# Patient Record
Sex: MALE | Race: BLACK OR AFRICAN AMERICAN | Employment: UNEMPLOYED | URBAN - METROPOLITAN AREA
[De-identification: names, ages, dates, MRNs, and addresses within clinical notes are randomized per-mention and may not be internally consistent; named-entity substitution may affect disease eponyms.]

---

## 2020-01-01 ENCOUNTER — APPOINTMENT (OUTPATIENT)
Dept: CT IMAGING | Age: 40
DRG: 182 | End: 2020-01-01
Payer: MEDICARE

## 2020-01-01 ENCOUNTER — ANESTHESIA (OUTPATIENT)
Dept: OPERATING ROOM | Age: 40
DRG: 182 | End: 2020-01-01
Payer: MEDICARE

## 2020-01-01 ENCOUNTER — APPOINTMENT (OUTPATIENT)
Dept: GENERAL RADIOLOGY | Age: 40
DRG: 182 | End: 2020-01-01
Payer: MEDICARE

## 2020-01-01 ENCOUNTER — ANESTHESIA EVENT (OUTPATIENT)
Dept: OPERATING ROOM | Age: 40
DRG: 182 | End: 2020-01-01
Payer: MEDICARE

## 2020-01-01 ENCOUNTER — HOSPITAL ENCOUNTER (INPATIENT)
Age: 40
LOS: 11 days | DRG: 182 | End: 2020-03-16
Attending: EMERGENCY MEDICINE | Admitting: THORACIC SURGERY (CARDIOTHORACIC VASCULAR SURGERY)
Payer: MEDICARE

## 2020-01-01 VITALS
OXYGEN SATURATION: 66 % | TEMPERATURE: 99.5 F | SYSTOLIC BLOOD PRESSURE: 142 MMHG | RESPIRATION RATE: 20 BRPM | DIASTOLIC BLOOD PRESSURE: 64 MMHG

## 2020-01-01 VITALS — OXYGEN SATURATION: 85 % | TEMPERATURE: 101.1 F | RESPIRATION RATE: 19 BRPM

## 2020-01-01 LAB
ABO/RH: NORMAL
ACTIVATED CLOTTING TIME: 116 SEC (ref 99–130)
ACTIVATED CLOTTING TIME: 124 SEC (ref 99–130)
ACTIVATED CLOTTING TIME: 132 SEC (ref 99–130)
ACTIVATED CLOTTING TIME: 560 SEC (ref 99–130)
ACTIVATED CLOTTING TIME: 620 SEC (ref 99–130)
ACTIVATED CLOTTING TIME: 677 SEC (ref 99–130)
ACTIVATED CLOTTING TIME: 687 SEC (ref 99–130)
ACTIVATED CLOTTING TIME: 819 SEC (ref 99–130)
ACTIVATED CLOTTING TIME: 852 SEC (ref 99–130)
ALBUMIN SERPL-MCNC: 2 G/DL (ref 3.4–5)
ALBUMIN SERPL-MCNC: 2 G/DL (ref 3.4–5)
ALBUMIN SERPL-MCNC: 2.1 G/DL (ref 3.4–5)
ALBUMIN SERPL-MCNC: 2.2 G/DL (ref 3.4–5)
ALBUMIN SERPL-MCNC: 2.3 G/DL (ref 3.4–5)
ALBUMIN SERPL-MCNC: 2.4 G/DL (ref 3.4–5)
ALBUMIN SERPL-MCNC: 2.5 G/DL (ref 3.4–5)
ALBUMIN SERPL-MCNC: 2.6 G/DL (ref 3.4–5)
ALBUMIN SERPL-MCNC: 2.7 G/DL (ref 3.4–5)
ALBUMIN SERPL-MCNC: 2.7 G/DL (ref 3.4–5)
ALBUMIN SERPL-MCNC: 2.8 G/DL (ref 3.4–5)
ALBUMIN SERPL-MCNC: 3.2 G/DL (ref 3.4–5)
ALBUMIN SERPL-MCNC: 3.2 G/DL (ref 3.4–5)
ALBUMIN SERPL-MCNC: 3.3 G/DL (ref 3.4–5)
ALBUMIN SERPL-MCNC: 3.6 G/DL (ref 3.4–5)
ALBUMIN SERPL-MCNC: 4 G/DL (ref 3.4–5)
ALP BLD-CCNC: 270 U/L (ref 40–129)
ALP BLD-CCNC: 298 U/L (ref 40–129)
ALP BLD-CCNC: 389 U/L (ref 40–129)
ALP BLD-CCNC: 46 U/L (ref 40–129)
ALP BLD-CCNC: 48 U/L (ref 40–129)
ALP BLD-CCNC: 481 U/L (ref 40–129)
ALP BLD-CCNC: 485 U/L (ref 40–129)
ALP BLD-CCNC: 49 U/L (ref 40–129)
ALP BLD-CCNC: 49 U/L (ref 40–129)
ALP BLD-CCNC: 50 U/L (ref 40–129)
ALP BLD-CCNC: 53 U/L (ref 40–129)
ALP BLD-CCNC: 53 U/L (ref 40–129)
ALT SERPL-CCNC: 13 U/L (ref 10–40)
ALT SERPL-CCNC: 21 U/L (ref 10–40)
ALT SERPL-CCNC: 22 U/L (ref 10–40)
ALT SERPL-CCNC: 24 U/L (ref 10–40)
ALT SERPL-CCNC: 25 U/L (ref 10–40)
ALT SERPL-CCNC: 29 U/L (ref 10–40)
ALT SERPL-CCNC: 3286 U/L (ref 10–40)
ALT SERPL-CCNC: 4601 U/L (ref 10–40)
ALT SERPL-CCNC: 54 U/L (ref 10–40)
ALT SERPL-CCNC: 5488 U/L (ref 10–40)
ALT SERPL-CCNC: >7000 U/L (ref 10–40)
ALT SERPL-CCNC: >7000 U/L (ref 10–40)
AMMONIA: 114 UMOL/L (ref 16–60)
AMPHETAMINE SCREEN, URINE: POSITIVE
ANION GAP SERPL CALCULATED.3IONS-SCNC: 10 MMOL/L (ref 3–16)
ANION GAP SERPL CALCULATED.3IONS-SCNC: 11 MMOL/L (ref 3–16)
ANION GAP SERPL CALCULATED.3IONS-SCNC: 12 MMOL/L (ref 3–16)
ANION GAP SERPL CALCULATED.3IONS-SCNC: 13 MMOL/L (ref 3–16)
ANION GAP SERPL CALCULATED.3IONS-SCNC: 13 MMOL/L (ref 3–16)
ANION GAP SERPL CALCULATED.3IONS-SCNC: 14 MMOL/L (ref 3–16)
ANION GAP SERPL CALCULATED.3IONS-SCNC: 15 MMOL/L (ref 3–16)
ANION GAP SERPL CALCULATED.3IONS-SCNC: 16 MMOL/L (ref 3–16)
ANION GAP SERPL CALCULATED.3IONS-SCNC: 17 MMOL/L (ref 3–16)
ANION GAP SERPL CALCULATED.3IONS-SCNC: 17 MMOL/L (ref 3–16)
ANION GAP SERPL CALCULATED.3IONS-SCNC: 18 MMOL/L (ref 3–16)
ANION GAP SERPL CALCULATED.3IONS-SCNC: 18 MMOL/L (ref 3–16)
ANION GAP SERPL CALCULATED.3IONS-SCNC: 19 MMOL/L (ref 3–16)
ANION GAP SERPL CALCULATED.3IONS-SCNC: 19 MMOL/L (ref 3–16)
ANION GAP SERPL CALCULATED.3IONS-SCNC: 21 MMOL/L (ref 3–16)
ANION GAP SERPL CALCULATED.3IONS-SCNC: 21 MMOL/L (ref 3–16)
ANION GAP SERPL CALCULATED.3IONS-SCNC: 22 MMOL/L (ref 3–16)
ANION GAP SERPL CALCULATED.3IONS-SCNC: 23 MMOL/L (ref 3–16)
ANION GAP SERPL CALCULATED.3IONS-SCNC: 24 MMOL/L (ref 3–16)
ANION GAP SERPL CALCULATED.3IONS-SCNC: 24 MMOL/L (ref 3–16)
ANION GAP SERPL CALCULATED.3IONS-SCNC: 25 MMOL/L (ref 3–16)
ANION GAP SERPL CALCULATED.3IONS-SCNC: 28 MMOL/L (ref 3–16)
ANION GAP SERPL CALCULATED.3IONS-SCNC: 29 MMOL/L (ref 3–16)
ANION GAP SERPL CALCULATED.3IONS-SCNC: 31 MMOL/L (ref 3–16)
ANION GAP SERPL CALCULATED.3IONS-SCNC: 35 MMOL/L (ref 3–16)
ANION GAP SERPL CALCULATED.3IONS-SCNC: 8 MMOL/L (ref 3–16)
ANISOCYTOSIS: ABNORMAL
ANTIBODY SCREEN: NORMAL
ANTIBODY SCREEN: NORMAL
APPEARANCE BAL (LAVAGE): NORMAL
APTT: 23.8 SEC (ref 24.2–36.2)
APTT: 24.3 SEC (ref 24.2–36.2)
APTT: 24.4 SEC (ref 24.2–36.2)
APTT: 25.5 SEC (ref 24.2–36.2)
APTT: 25.9 SEC (ref 24.2–36.2)
APTT: 26 SEC (ref 24.2–36.2)
APTT: 26.3 SEC (ref 24.2–36.2)
APTT: 26.4 SEC (ref 24.2–36.2)
APTT: 26.9 SEC (ref 24.2–36.2)
APTT: 27.8 SEC (ref 24.2–36.2)
APTT: 28.7 SEC (ref 24.2–36.2)
APTT: 28.9 SEC (ref 24.2–36.2)
APTT: 29.7 SEC (ref 24.2–36.2)
APTT: 30.5 SEC (ref 24.2–36.2)
APTT: 31.1 SEC (ref 24.2–36.2)
APTT: 34.2 SEC (ref 24.2–36.2)
APTT: 36 SEC (ref 24.2–36.2)
APTT: 90.5 SEC (ref 24.2–36.2)
AST SERPL-CCNC: 117 U/L (ref 15–37)
AST SERPL-CCNC: 153 U/L (ref 15–37)
AST SERPL-CCNC: 17 U/L (ref 15–37)
AST SERPL-CCNC: 203 U/L (ref 15–37)
AST SERPL-CCNC: 353 U/L (ref 15–37)
AST SERPL-CCNC: 71 U/L (ref 15–37)
AST SERPL-CCNC: 96 U/L (ref 15–37)
AST SERPL-CCNC: >7000 U/L (ref 15–37)
ATYPICAL LYMPHOCYTE RELATIVE PERCENT: 1 % (ref 0–6)
BANDED NEUTROPHILS RELATIVE PERCENT: 1 % (ref 0–7)
BANDED NEUTROPHILS RELATIVE PERCENT: 10 % (ref 0–7)
BANDED NEUTROPHILS RELATIVE PERCENT: 17 % (ref 0–7)
BANDED NEUTROPHILS RELATIVE PERCENT: 34 % (ref 0–7)
BANDED NEUTROPHILS RELATIVE PERCENT: 5 % (ref 0–7)
BARBITURATE SCREEN URINE: ABNORMAL
BASE EXCESS ARTERIAL: -0.1 MMOL/L (ref -3–3)
BASE EXCESS ARTERIAL: -0.2 MMOL/L (ref -3–3)
BASE EXCESS ARTERIAL: -0.4 MMOL/L (ref -3–3)
BASE EXCESS ARTERIAL: -0.4 MMOL/L (ref -3–3)
BASE EXCESS ARTERIAL: -0.7 MMOL/L (ref -3–3)
BASE EXCESS ARTERIAL: -1 (ref -3–3)
BASE EXCESS ARTERIAL: -1.6 MMOL/L (ref -3–3)
BASE EXCESS ARTERIAL: -1.6 MMOL/L (ref -3–3)
BASE EXCESS ARTERIAL: -1.8 MMOL/L (ref -3–3)
BASE EXCESS ARTERIAL: -10.3 MMOL/L (ref -3–3)
BASE EXCESS ARTERIAL: -2 (ref -3–3)
BASE EXCESS ARTERIAL: -2.3 MMOL/L (ref -3–3)
BASE EXCESS ARTERIAL: -2.3 MMOL/L (ref -3–3)
BASE EXCESS ARTERIAL: -2.5 MMOL/L (ref -3–3)
BASE EXCESS ARTERIAL: -2.9 MMOL/L (ref -3–3)
BASE EXCESS ARTERIAL: -3 (ref -3–3)
BASE EXCESS ARTERIAL: -3 MMOL/L (ref -3–3)
BASE EXCESS ARTERIAL: -3.7 MMOL/L (ref -3–3)
BASE EXCESS ARTERIAL: -4 (ref -3–3)
BASE EXCESS ARTERIAL: -4.1 MMOL/L (ref -3–3)
BASE EXCESS ARTERIAL: -5 (ref -3–3)
BASE EXCESS ARTERIAL: -5.4 MMOL/L (ref -3–3)
BASE EXCESS ARTERIAL: -7 (ref -3–3)
BASE EXCESS ARTERIAL: -8 (ref -3–3)
BASE EXCESS ARTERIAL: -8 (ref -3–3)
BASE EXCESS ARTERIAL: -8 MMOL/L (ref -3–3)
BASE EXCESS ARTERIAL: -8 MMOL/L (ref -3–3)
BASE EXCESS ARTERIAL: 0 (ref -3–3)
BASE EXCESS ARTERIAL: 0 MMOL/L (ref -3–3)
BASE EXCESS ARTERIAL: 0.1 MMOL/L (ref -3–3)
BASE EXCESS ARTERIAL: 0.3 MMOL/L (ref -3–3)
BASE EXCESS ARTERIAL: 0.7 MMOL/L (ref -3–3)
BASE EXCESS ARTERIAL: 1 (ref -3–3)
BASE EXCESS ARTERIAL: 1 (ref -3–3)
BASE EXCESS ARTERIAL: 1 MMOL/L (ref -3–3)
BASE EXCESS ARTERIAL: 1.4 MMOL/L (ref -3–3)
BASE EXCESS ARTERIAL: 1.4 MMOL/L (ref -3–3)
BASE EXCESS ARTERIAL: 1.5 MMOL/L (ref -3–3)
BASE EXCESS ARTERIAL: 1.8 MMOL/L (ref -3–3)
BASE EXCESS ARTERIAL: 2 (ref -3–3)
BASE EXCESS ARTERIAL: 2 (ref -3–3)
BASE EXCESS ARTERIAL: 2.6 MMOL/L (ref -3–3)
BASE EXCESS ARTERIAL: 3 (ref -3–3)
BASE EXCESS ARTERIAL: 3.1 MMOL/L (ref -3–3)
BASE EXCESS ARTERIAL: 3.4 MMOL/L (ref -3–3)
BASE EXCESS ARTERIAL: 4 MMOL/L (ref -3–3)
BASE EXCESS ARTERIAL: 4.3 MMOL/L (ref -3–3)
BASE EXCESS ARTERIAL: 4.4 MMOL/L (ref -3–3)
BASE EXCESS ARTERIAL: 4.7 MMOL/L (ref -3–3)
BASE EXCESS ARTERIAL: 5 (ref -3–3)
BASE EXCESS ARTERIAL: 6 (ref -3–3)
BASE EXCESS ARTERIAL: 6 MMOL/L (ref -3–3)
BASE EXCESS ARTERIAL: 7 (ref -3–3)
BASE EXCESS VENOUS: 1 (ref -3–3)
BASE EXCESS VENOUS: 6.1 MMOL/L
BASOPHILS ABSOLUTE: 0 K/UL (ref 0–0.2)
BASOPHILS ABSOLUTE: 0.1 K/UL (ref 0–0.2)
BASOPHILS RELATIVE PERCENT: 0 %
BASOPHILS RELATIVE PERCENT: 0.1 %
BASOPHILS RELATIVE PERCENT: 0.2 %
BASOPHILS RELATIVE PERCENT: 0.2 %
BASOPHILS RELATIVE PERCENT: 0.3 %
BASOPHILS RELATIVE PERCENT: 1.1 %
BENZODIAZEPINE SCREEN, URINE: POSITIVE
BILIRUB SERPL-MCNC: 0.8 MG/DL (ref 0–1)
BILIRUB SERPL-MCNC: 0.8 MG/DL (ref 0–1)
BILIRUB SERPL-MCNC: 0.9 MG/DL (ref 0–1)
BILIRUB SERPL-MCNC: 0.9 MG/DL (ref 0–1)
BILIRUB SERPL-MCNC: 1 MG/DL (ref 0–1)
BILIRUB SERPL-MCNC: 1.2 MG/DL (ref 0–1)
BILIRUB SERPL-MCNC: 1.8 MG/DL (ref 0–1)
BILIRUB SERPL-MCNC: 10.2 MG/DL (ref 0–1)
BILIRUB SERPL-MCNC: 12.5 MG/DL (ref 0–1)
BILIRUB SERPL-MCNC: 3.2 MG/DL (ref 0–1)
BILIRUB SERPL-MCNC: 3.8 MG/DL (ref 0–1)
BILIRUB SERPL-MCNC: 6.2 MG/DL (ref 0–1)
BILIRUBIN DIRECT: 0.3 MG/DL (ref 0–0.3)
BILIRUBIN DIRECT: 0.4 MG/DL (ref 0–0.3)
BILIRUBIN DIRECT: 1.9 MG/DL (ref 0–0.3)
BILIRUBIN DIRECT: 2.6 MG/DL (ref 0–0.3)
BILIRUBIN DIRECT: 4.3 MG/DL (ref 0–0.3)
BILIRUBIN DIRECT: 7 MG/DL (ref 0–0.3)
BILIRUBIN DIRECT: 8.8 MG/DL (ref 0–0.3)
BILIRUBIN DIRECT: <0.2 MG/DL (ref 0–0.3)
BILIRUBIN, INDIRECT: 0.5 MG/DL (ref 0–1)
BILIRUBIN, INDIRECT: 0.5 MG/DL (ref 0–1)
BILIRUBIN, INDIRECT: 0.6 MG/DL (ref 0–1)
BILIRUBIN, INDIRECT: 0.6 MG/DL (ref 0–1)
BILIRUBIN, INDIRECT: 0.8 MG/DL (ref 0–1)
BILIRUBIN, INDIRECT: 1.2 MG/DL (ref 0–1)
BILIRUBIN, INDIRECT: 1.3 MG/DL (ref 0–1)
BILIRUBIN, INDIRECT: 1.5 MG/DL (ref 0–1)
BILIRUBIN, INDIRECT: 1.9 MG/DL (ref 0–1)
BILIRUBIN, INDIRECT: 3.2 MG/DL (ref 0–1)
BILIRUBIN, INDIRECT: 3.7 MG/DL (ref 0–1)
BILIRUBIN, INDIRECT: ABNORMAL MG/DL (ref 0–1)
BLOOD BANK DISPENSE STATUS: NORMAL
BLOOD BANK PRODUCT CODE: NORMAL
BLOOD CULTURE, ROUTINE: NORMAL
BPU ID: NORMAL
BUN BLDV-MCNC: 18 MG/DL (ref 7–20)
BUN BLDV-MCNC: 19 MG/DL (ref 7–20)
BUN BLDV-MCNC: 24 MG/DL (ref 7–20)
BUN BLDV-MCNC: 30 MG/DL (ref 7–20)
BUN BLDV-MCNC: 31 MG/DL (ref 7–20)
BUN BLDV-MCNC: 32 MG/DL (ref 7–20)
BUN BLDV-MCNC: 33 MG/DL (ref 7–20)
BUN BLDV-MCNC: 34 MG/DL (ref 7–20)
BUN BLDV-MCNC: 35 MG/DL (ref 7–20)
BUN BLDV-MCNC: 36 MG/DL (ref 7–20)
BUN BLDV-MCNC: 37 MG/DL (ref 7–20)
BUN BLDV-MCNC: 38 MG/DL (ref 7–20)
BUN BLDV-MCNC: 39 MG/DL (ref 7–20)
BUN BLDV-MCNC: 40 MG/DL (ref 7–20)
BUN BLDV-MCNC: 41 MG/DL (ref 7–20)
BUN BLDV-MCNC: 42 MG/DL (ref 7–20)
BUN BLDV-MCNC: 43 MG/DL (ref 7–20)
BUN BLDV-MCNC: 59 MG/DL (ref 7–20)
BUN BLDV-MCNC: 61 MG/DL (ref 7–20)
BUN BLDV-MCNC: 63 MG/DL (ref 7–20)
BUN BLDV-MCNC: 64 MG/DL (ref 7–20)
BUN BLDV-MCNC: 65 MG/DL (ref 7–20)
BUN BLDV-MCNC: 67 MG/DL (ref 7–20)
BUN BLDV-MCNC: 71 MG/DL (ref 7–20)
BUN BLDV-MCNC: 74 MG/DL (ref 7–20)
BUN BLDV-MCNC: 75 MG/DL (ref 7–20)
CALCIUM IONIZED: 0.93 MMOL/L (ref 1.12–1.32)
CALCIUM IONIZED: 0.93 MMOL/L (ref 1.12–1.32)
CALCIUM IONIZED: 0.94 MMOL/L (ref 1.12–1.32)
CALCIUM IONIZED: 0.95 MMOL/L (ref 1.12–1.32)
CALCIUM IONIZED: 0.96 MMOL/L (ref 1.12–1.32)
CALCIUM IONIZED: 0.97 MMOL/L (ref 1.12–1.32)
CALCIUM IONIZED: 0.98 MMOL/L (ref 1.12–1.32)
CALCIUM IONIZED: 0.99 MMOL/L (ref 1.12–1.32)
CALCIUM IONIZED: 1 MMOL/L (ref 1.12–1.32)
CALCIUM IONIZED: 1 MMOL/L (ref 1.12–1.32)
CALCIUM IONIZED: 1.01 MMOL/L (ref 1.12–1.32)
CALCIUM IONIZED: 1.03 MMOL/L (ref 1.12–1.32)
CALCIUM IONIZED: 1.04 MMOL/L (ref 1.12–1.32)
CALCIUM IONIZED: 1.04 MMOL/L (ref 1.12–1.32)
CALCIUM IONIZED: 1.08 MMOL/L (ref 1.12–1.32)
CALCIUM IONIZED: 1.09 MMOL/L (ref 1.12–1.32)
CALCIUM IONIZED: 1.1 MMOL/L (ref 1.12–1.32)
CALCIUM IONIZED: 1.12 MMOL/L (ref 1.12–1.32)
CALCIUM IONIZED: 1.13 MMOL/L (ref 1.12–1.32)
CALCIUM IONIZED: 1.13 MMOL/L (ref 1.12–1.32)
CALCIUM IONIZED: 1.14 MMOL/L (ref 1.12–1.32)
CALCIUM IONIZED: 1.15 MMOL/L (ref 1.12–1.32)
CALCIUM IONIZED: 1.17 MMOL/L (ref 1.12–1.32)
CALCIUM IONIZED: 1.17 MMOL/L (ref 1.12–1.32)
CALCIUM IONIZED: 1.18 MMOL/L (ref 1.12–1.32)
CALCIUM IONIZED: 1.19 MMOL/L (ref 1.12–1.32)
CALCIUM IONIZED: 1.2 MMOL/L (ref 1.12–1.32)
CALCIUM IONIZED: 1.2 MMOL/L (ref 1.12–1.32)
CALCIUM IONIZED: 1.21 MMOL/L (ref 1.12–1.32)
CALCIUM IONIZED: 1.21 MMOL/L (ref 1.12–1.32)
CALCIUM IONIZED: 1.22 MMOL/L (ref 1.12–1.32)
CALCIUM IONIZED: 1.25 MMOL/L (ref 1.12–1.32)
CALCIUM SERPL-MCNC: 7.3 MG/DL (ref 8.3–10.6)
CALCIUM SERPL-MCNC: 7.5 MG/DL (ref 8.3–10.6)
CALCIUM SERPL-MCNC: 7.6 MG/DL (ref 8.3–10.6)
CALCIUM SERPL-MCNC: 7.7 MG/DL (ref 8.3–10.6)
CALCIUM SERPL-MCNC: 7.7 MG/DL (ref 8.3–10.6)
CALCIUM SERPL-MCNC: 8 MG/DL (ref 8.3–10.6)
CALCIUM SERPL-MCNC: 8.1 MG/DL (ref 8.3–10.6)
CALCIUM SERPL-MCNC: 8.2 MG/DL (ref 8.3–10.6)
CALCIUM SERPL-MCNC: 8.3 MG/DL (ref 8.3–10.6)
CALCIUM SERPL-MCNC: 8.4 MG/DL (ref 8.3–10.6)
CALCIUM SERPL-MCNC: 8.5 MG/DL (ref 8.3–10.6)
CALCIUM SERPL-MCNC: 8.6 MG/DL (ref 8.3–10.6)
CALCIUM SERPL-MCNC: 8.6 MG/DL (ref 8.3–10.6)
CALCIUM SERPL-MCNC: 8.7 MG/DL (ref 8.3–10.6)
CALCIUM SERPL-MCNC: 8.8 MG/DL (ref 8.3–10.6)
CALCIUM SERPL-MCNC: 8.9 MG/DL (ref 8.3–10.6)
CALCIUM SERPL-MCNC: 8.9 MG/DL (ref 8.3–10.6)
CALCIUM SERPL-MCNC: 9.1 MG/DL (ref 8.3–10.6)
CALCIUM SERPL-MCNC: 9.5 MG/DL (ref 8.3–10.6)
CANNABINOID SCREEN URINE: POSITIVE
CARBOXYHEMOGLOBIN ARTERIAL: 0.7 % (ref 0–1.5)
CARBOXYHEMOGLOBIN ARTERIAL: 0.7 % (ref 0–1.5)
CARBOXYHEMOGLOBIN ARTERIAL: 0.8 % (ref 0–1.5)
CARBOXYHEMOGLOBIN ARTERIAL: 0.9 % (ref 0–1.5)
CARBOXYHEMOGLOBIN ARTERIAL: 1 % (ref 0–1.5)
CARBOXYHEMOGLOBIN ARTERIAL: 1.1 % (ref 0–1.5)
CARBOXYHEMOGLOBIN ARTERIAL: 1.2 % (ref 0–1.5)
CARBOXYHEMOGLOBIN ARTERIAL: 1.3 % (ref 0–1.5)
CARBOXYHEMOGLOBIN ARTERIAL: 1.3 % (ref 0–1.5)
CARBOXYHEMOGLOBIN ARTERIAL: 1.4 % (ref 0–1.5)
CARBOXYHEMOGLOBIN ARTERIAL: 1.5 % (ref 0–1.5)
CARBOXYHEMOGLOBIN ARTERIAL: 1.8 % (ref 0–1.5)
CARBOXYHEMOGLOBIN ARTERIAL: 1.9 % (ref 0–1.5)
CARBOXYHEMOGLOBIN ARTERIAL: 2.4 % (ref 0–1.5)
CARBOXYHEMOGLOBIN ARTERIAL: 2.7 % (ref 0–1.5)
CARBOXYHEMOGLOBIN ARTERIAL: 3 % (ref 0–1.5)
CARBOXYHEMOGLOBIN: 1.9 %
CHLORIDE BLD-SCNC: 100 MMOL/L (ref 99–110)
CHLORIDE BLD-SCNC: 101 MMOL/L (ref 99–110)
CHLORIDE BLD-SCNC: 102 MMOL/L (ref 99–110)
CHLORIDE BLD-SCNC: 102 MMOL/L (ref 99–110)
CHLORIDE BLD-SCNC: 103 MMOL/L (ref 99–110)
CHLORIDE BLD-SCNC: 103 MMOL/L (ref 99–110)
CHLORIDE BLD-SCNC: 104 MMOL/L (ref 99–110)
CHLORIDE BLD-SCNC: 105 MMOL/L (ref 99–110)
CHLORIDE BLD-SCNC: 106 MMOL/L (ref 99–110)
CHLORIDE BLD-SCNC: 107 MMOL/L (ref 99–110)
CHLORIDE BLD-SCNC: 107 MMOL/L (ref 99–110)
CHLORIDE BLD-SCNC: 80 MMOL/L (ref 99–110)
CHLORIDE BLD-SCNC: 81 MMOL/L (ref 99–110)
CHLORIDE BLD-SCNC: 82 MMOL/L (ref 99–110)
CHLORIDE BLD-SCNC: 84 MMOL/L (ref 99–110)
CHLORIDE BLD-SCNC: 84 MMOL/L (ref 99–110)
CHLORIDE BLD-SCNC: 85 MMOL/L (ref 99–110)
CHLORIDE BLD-SCNC: 86 MMOL/L (ref 99–110)
CHLORIDE BLD-SCNC: 88 MMOL/L (ref 99–110)
CHLORIDE BLD-SCNC: 88 MMOL/L (ref 99–110)
CHLORIDE BLD-SCNC: 89 MMOL/L (ref 99–110)
CHLORIDE BLD-SCNC: 91 MMOL/L (ref 99–110)
CHLORIDE BLD-SCNC: 93 MMOL/L (ref 99–110)
CHLORIDE BLD-SCNC: 94 MMOL/L (ref 99–110)
CHLORIDE BLD-SCNC: 94 MMOL/L (ref 99–110)
CHLORIDE BLD-SCNC: 96 MMOL/L (ref 99–110)
CHLORIDE BLD-SCNC: 96 MMOL/L (ref 99–110)
CHLORIDE BLD-SCNC: 97 MMOL/L (ref 99–110)
CHLORIDE BLD-SCNC: 99 MMOL/L (ref 99–110)
CLOT EVALUATION BAL: NORMAL
CO2: 15 MMOL/L (ref 21–32)
CO2: 16 MMOL/L (ref 21–32)
CO2: 17 MMOL/L (ref 21–32)
CO2: 19 MMOL/L (ref 21–32)
CO2: 20 MMOL/L (ref 21–32)
CO2: 21 MMOL/L (ref 21–32)
CO2: 22 MMOL/L (ref 21–32)
CO2: 23 MMOL/L (ref 21–32)
CO2: 24 MMOL/L (ref 21–32)
CO2: 25 MMOL/L (ref 21–32)
CO2: 26 MMOL/L (ref 21–32)
COCAINE METABOLITE SCREEN URINE: POSITIVE
COLOR LAVAGE: NORMAL
CREAT SERPL-MCNC: 0.9 MG/DL (ref 0.9–1.3)
CREAT SERPL-MCNC: 1.1 MG/DL (ref 0.9–1.3)
CREAT SERPL-MCNC: 1.2 MG/DL (ref 0.9–1.3)
CREAT SERPL-MCNC: 1.3 MG/DL (ref 0.9–1.3)
CREAT SERPL-MCNC: 1.4 MG/DL (ref 0.9–1.3)
CREAT SERPL-MCNC: 1.4 MG/DL (ref 0.9–1.3)
CREAT SERPL-MCNC: 1.5 MG/DL (ref 0.9–1.3)
CREAT SERPL-MCNC: 1.6 MG/DL (ref 0.9–1.3)
CREAT SERPL-MCNC: 1.6 MG/DL (ref 0.9–1.3)
CREAT SERPL-MCNC: 1.7 MG/DL (ref 0.9–1.3)
CREAT SERPL-MCNC: 1.8 MG/DL (ref 0.9–1.3)
CREAT SERPL-MCNC: 1.9 MG/DL (ref 0.9–1.3)
CREAT SERPL-MCNC: 2 MG/DL (ref 0.9–1.3)
CREAT SERPL-MCNC: 2.1 MG/DL (ref 0.9–1.3)
CREAT SERPL-MCNC: 2.2 MG/DL (ref 0.9–1.3)
CREAT SERPL-MCNC: 2.3 MG/DL (ref 0.9–1.3)
CREAT SERPL-MCNC: 2.4 MG/DL (ref 0.9–1.3)
CREAT SERPL-MCNC: 2.7 MG/DL (ref 0.9–1.3)
CREAT SERPL-MCNC: 2.8 MG/DL (ref 0.9–1.3)
CREAT SERPL-MCNC: 2.9 MG/DL (ref 0.9–1.3)
CREAT SERPL-MCNC: 3 MG/DL (ref 0.9–1.3)
CREAT SERPL-MCNC: 3 MG/DL (ref 0.9–1.3)
CREAT SERPL-MCNC: 3.1 MG/DL (ref 0.9–1.3)
CREAT SERPL-MCNC: 3.2 MG/DL (ref 0.9–1.3)
CREAT SERPL-MCNC: 3.3 MG/DL (ref 0.9–1.3)
CREAT SERPL-MCNC: 3.4 MG/DL (ref 0.9–1.3)
CREAT SERPL-MCNC: 3.4 MG/DL (ref 0.9–1.3)
CREAT SERPL-MCNC: 3.5 MG/DL (ref 0.9–1.3)
CREAT SERPL-MCNC: 3.6 MG/DL (ref 0.9–1.3)
CREAT SERPL-MCNC: 4.1 MG/DL (ref 0.9–1.3)
CULTURE, RESPIRATORY: NORMAL
D DIMER: 1312 NG/ML DDU (ref 0–229)
DESCRIPTION BLOOD BANK: NORMAL
DOHLE BODIES: PRESENT
DOHLE BODIES: PRESENT
EKG ATRIAL RATE: 84 BPM
EKG ATRIAL RATE: 85 BPM
EKG ATRIAL RATE: 90 BPM
EKG DIAGNOSIS: NORMAL
EKG P AXIS: -12 DEGREES
EKG P AXIS: 15 DEGREES
EKG P AXIS: 58 DEGREES
EKG P-R INTERVAL: 148 MS
EKG P-R INTERVAL: 156 MS
EKG P-R INTERVAL: 170 MS
EKG Q-T INTERVAL: 338 MS
EKG Q-T INTERVAL: 374 MS
EKG Q-T INTERVAL: 420 MS
EKG QRS DURATION: 118 MS
EKG QRS DURATION: 92 MS
EKG QRS DURATION: 96 MS
EKG QTC CALCULATION (BAZETT): 402 MS
EKG QTC CALCULATION (BAZETT): 457 MS
EKG QTC CALCULATION (BAZETT): 496 MS
EKG R AXIS: -34 DEGREES
EKG R AXIS: 257 DEGREES
EKG R AXIS: 38 DEGREES
EKG T AXIS: 118 DEGREES
EKG T AXIS: 43 DEGREES
EKG T AXIS: 47 DEGREES
EKG VENTRICULAR RATE: 84 BPM
EKG VENTRICULAR RATE: 85 BPM
EKG VENTRICULAR RATE: 90 BPM
EOSINOPHILS ABSOLUTE: 0 K/UL (ref 0–0.6)
EOSINOPHILS ABSOLUTE: 0.1 K/UL (ref 0–0.6)
EOSINOPHILS ABSOLUTE: 0.2 K/UL (ref 0–0.6)
EOSINOPHILS ABSOLUTE: 0.2 K/UL (ref 0–0.6)
EOSINOPHILS ABSOLUTE: 0.5 K/UL (ref 0–0.6)
EOSINOPHILS ABSOLUTE: 0.5 K/UL (ref 0–0.6)
EOSINOPHILS ABSOLUTE: 1.1 K/UL (ref 0–0.6)
EOSINOPHILS RELATIVE PERCENT: 0 %
EOSINOPHILS RELATIVE PERCENT: 0.6 %
EOSINOPHILS RELATIVE PERCENT: 2 %
EOSINOPHILS RELATIVE PERCENT: 4.7 %
EOSINOPHILS RELATIVE PERCENT: 5.8 %
EPITHELIAL CELLS FLUID: 100 %
FIBRINOGEN: 172 MG/DL (ref 200–397)
FIBRINOGEN: 194 MG/DL (ref 200–397)
FIBRINOGEN: 572 MG/DL (ref 200–397)
FIBRINOGEN: 96 MG/DL (ref 200–397)
FINAL REPORT: NORMAL
FINAL REPORT: NORMAL
GFR AFRICAN AMERICAN: 20
GFR AFRICAN AMERICAN: 23
GFR AFRICAN AMERICAN: 24
GFR AFRICAN AMERICAN: 25
GFR AFRICAN AMERICAN: 26
GFR AFRICAN AMERICAN: 27
GFR AFRICAN AMERICAN: 28
GFR AFRICAN AMERICAN: 28
GFR AFRICAN AMERICAN: 29
GFR AFRICAN AMERICAN: 30
GFR AFRICAN AMERICAN: 32
GFR AFRICAN AMERICAN: 36
GFR AFRICAN AMERICAN: 38
GFR AFRICAN AMERICAN: 40
GFR AFRICAN AMERICAN: 42
GFR AFRICAN AMERICAN: 45
GFR AFRICAN AMERICAN: 48
GFR AFRICAN AMERICAN: 51
GFR AFRICAN AMERICAN: 54
GFR AFRICAN AMERICAN: 58
GFR AFRICAN AMERICAN: 58
GFR AFRICAN AMERICAN: >60
GFR NON-AFRICAN AMERICAN: 16
GFR NON-AFRICAN AMERICAN: 19
GFR NON-AFRICAN AMERICAN: 19
GFR NON-AFRICAN AMERICAN: 20
GFR NON-AFRICAN AMERICAN: 20
GFR NON-AFRICAN AMERICAN: 21
GFR NON-AFRICAN AMERICAN: 22
GFR NON-AFRICAN AMERICAN: 22
GFR NON-AFRICAN AMERICAN: 23
GFR NON-AFRICAN AMERICAN: 23
GFR NON-AFRICAN AMERICAN: 24
GFR NON-AFRICAN AMERICAN: 25
GFR NON-AFRICAN AMERICAN: 26
GFR NON-AFRICAN AMERICAN: 30
GFR NON-AFRICAN AMERICAN: 32
GFR NON-AFRICAN AMERICAN: 33
GFR NON-AFRICAN AMERICAN: 35
GFR NON-AFRICAN AMERICAN: 37
GFR NON-AFRICAN AMERICAN: 39
GFR NON-AFRICAN AMERICAN: 42
GFR NON-AFRICAN AMERICAN: 45
GFR NON-AFRICAN AMERICAN: 48
GFR NON-AFRICAN AMERICAN: 48
GFR NON-AFRICAN AMERICAN: 52
GFR NON-AFRICAN AMERICAN: 56
GFR NON-AFRICAN AMERICAN: 56
GFR NON-AFRICAN AMERICAN: >60
GLUCOSE BLD-MCNC: 100 MG/DL (ref 70–99)
GLUCOSE BLD-MCNC: 101 MG/DL (ref 70–99)
GLUCOSE BLD-MCNC: 101 MG/DL (ref 70–99)
GLUCOSE BLD-MCNC: 102 MG/DL (ref 70–99)
GLUCOSE BLD-MCNC: 104 MG/DL (ref 70–99)
GLUCOSE BLD-MCNC: 104 MG/DL (ref 70–99)
GLUCOSE BLD-MCNC: 105 MG/DL (ref 70–99)
GLUCOSE BLD-MCNC: 106 MG/DL (ref 70–99)
GLUCOSE BLD-MCNC: 107 MG/DL (ref 70–99)
GLUCOSE BLD-MCNC: 108 MG/DL (ref 70–99)
GLUCOSE BLD-MCNC: 109 MG/DL (ref 70–99)
GLUCOSE BLD-MCNC: 110 MG/DL (ref 70–99)
GLUCOSE BLD-MCNC: 110 MG/DL (ref 70–99)
GLUCOSE BLD-MCNC: 111 MG/DL (ref 70–99)
GLUCOSE BLD-MCNC: 112 MG/DL (ref 70–99)
GLUCOSE BLD-MCNC: 113 MG/DL (ref 70–99)
GLUCOSE BLD-MCNC: 114 MG/DL (ref 70–99)
GLUCOSE BLD-MCNC: 115 MG/DL (ref 70–99)
GLUCOSE BLD-MCNC: 115 MG/DL (ref 70–99)
GLUCOSE BLD-MCNC: 116 MG/DL (ref 70–99)
GLUCOSE BLD-MCNC: 117 MG/DL (ref 70–99)
GLUCOSE BLD-MCNC: 117 MG/DL (ref 70–99)
GLUCOSE BLD-MCNC: 118 MG/DL (ref 70–99)
GLUCOSE BLD-MCNC: 119 MG/DL (ref 70–99)
GLUCOSE BLD-MCNC: 120 MG/DL (ref 70–99)
GLUCOSE BLD-MCNC: 121 MG/DL (ref 70–99)
GLUCOSE BLD-MCNC: 122 MG/DL (ref 70–99)
GLUCOSE BLD-MCNC: 123 MG/DL (ref 70–99)
GLUCOSE BLD-MCNC: 124 MG/DL (ref 70–99)
GLUCOSE BLD-MCNC: 125 MG/DL (ref 70–99)
GLUCOSE BLD-MCNC: 126 MG/DL (ref 70–99)
GLUCOSE BLD-MCNC: 127 MG/DL (ref 70–99)
GLUCOSE BLD-MCNC: 128 MG/DL (ref 70–99)
GLUCOSE BLD-MCNC: 128 MG/DL (ref 70–99)
GLUCOSE BLD-MCNC: 129 MG/DL (ref 70–99)
GLUCOSE BLD-MCNC: 130 MG/DL (ref 70–99)
GLUCOSE BLD-MCNC: 130 MG/DL (ref 70–99)
GLUCOSE BLD-MCNC: 131 MG/DL (ref 70–99)
GLUCOSE BLD-MCNC: 132 MG/DL (ref 70–99)
GLUCOSE BLD-MCNC: 133 MG/DL (ref 70–99)
GLUCOSE BLD-MCNC: 134 MG/DL (ref 70–99)
GLUCOSE BLD-MCNC: 134 MG/DL (ref 70–99)
GLUCOSE BLD-MCNC: 135 MG/DL (ref 70–99)
GLUCOSE BLD-MCNC: 136 MG/DL (ref 70–99)
GLUCOSE BLD-MCNC: 136 MG/DL (ref 70–99)
GLUCOSE BLD-MCNC: 137 MG/DL (ref 70–99)
GLUCOSE BLD-MCNC: 138 MG/DL (ref 70–99)
GLUCOSE BLD-MCNC: 139 MG/DL (ref 70–99)
GLUCOSE BLD-MCNC: 145 MG/DL (ref 70–99)
GLUCOSE BLD-MCNC: 146 MG/DL (ref 70–99)
GLUCOSE BLD-MCNC: 154 MG/DL (ref 70–99)
GLUCOSE BLD-MCNC: 155 MG/DL (ref 70–99)
GLUCOSE BLD-MCNC: 159 MG/DL (ref 70–99)
GLUCOSE BLD-MCNC: 164 MG/DL (ref 70–99)
GLUCOSE BLD-MCNC: 167 MG/DL (ref 70–99)
GLUCOSE BLD-MCNC: 170 MG/DL (ref 70–99)
GLUCOSE BLD-MCNC: 177 MG/DL (ref 70–99)
GLUCOSE BLD-MCNC: 181 MG/DL (ref 70–99)
GLUCOSE BLD-MCNC: 192 MG/DL (ref 70–99)
GLUCOSE BLD-MCNC: 195 MG/DL (ref 70–99)
GLUCOSE BLD-MCNC: 195 MG/DL (ref 70–99)
GLUCOSE BLD-MCNC: 196 MG/DL (ref 70–99)
GLUCOSE BLD-MCNC: 196 MG/DL (ref 70–99)
GLUCOSE BLD-MCNC: 197 MG/DL (ref 70–99)
GLUCOSE BLD-MCNC: 198 MG/DL (ref 70–99)
GLUCOSE BLD-MCNC: 198 MG/DL (ref 70–99)
GLUCOSE BLD-MCNC: 204 MG/DL (ref 70–99)
GLUCOSE BLD-MCNC: 207 MG/DL (ref 70–99)
GLUCOSE BLD-MCNC: 207 MG/DL (ref 70–99)
GLUCOSE BLD-MCNC: 214 MG/DL (ref 70–99)
GLUCOSE BLD-MCNC: 220 MG/DL (ref 70–99)
GLUCOSE BLD-MCNC: 220 MG/DL (ref 70–99)
GLUCOSE BLD-MCNC: 221 MG/DL (ref 70–99)
GLUCOSE BLD-MCNC: 231 MG/DL (ref 70–99)
GLUCOSE BLD-MCNC: 234 MG/DL (ref 70–99)
GLUCOSE BLD-MCNC: 25 MG/DL (ref 70–99)
GLUCOSE BLD-MCNC: 33 MG/DL (ref 70–99)
GLUCOSE BLD-MCNC: 39 MG/DL (ref 70–99)
GLUCOSE BLD-MCNC: 44 MG/DL (ref 70–99)
GLUCOSE BLD-MCNC: 45 MG/DL (ref 70–99)
GLUCOSE BLD-MCNC: 53 MG/DL (ref 70–99)
GLUCOSE BLD-MCNC: 59 MG/DL (ref 70–99)
GLUCOSE BLD-MCNC: 63 MG/DL (ref 70–99)
GLUCOSE BLD-MCNC: 66 MG/DL (ref 70–99)
GLUCOSE BLD-MCNC: 68 MG/DL (ref 70–99)
GLUCOSE BLD-MCNC: 74 MG/DL (ref 70–99)
GLUCOSE BLD-MCNC: 74 MG/DL (ref 70–99)
GLUCOSE BLD-MCNC: 77 MG/DL (ref 70–99)
GLUCOSE BLD-MCNC: 77 MG/DL (ref 70–99)
GLUCOSE BLD-MCNC: 78 MG/DL (ref 70–99)
GLUCOSE BLD-MCNC: 80 MG/DL (ref 70–99)
GLUCOSE BLD-MCNC: 81 MG/DL (ref 70–99)
GLUCOSE BLD-MCNC: 82 MG/DL (ref 70–99)
GLUCOSE BLD-MCNC: 82 MG/DL (ref 70–99)
GLUCOSE BLD-MCNC: 83 MG/DL (ref 70–99)
GLUCOSE BLD-MCNC: 84 MG/DL (ref 70–99)
GLUCOSE BLD-MCNC: 85 MG/DL (ref 70–99)
GLUCOSE BLD-MCNC: 85 MG/DL (ref 70–99)
GLUCOSE BLD-MCNC: 86 MG/DL (ref 70–99)
GLUCOSE BLD-MCNC: 87 MG/DL (ref 70–99)
GLUCOSE BLD-MCNC: 87 MG/DL (ref 70–99)
GLUCOSE BLD-MCNC: 88 MG/DL (ref 70–99)
GLUCOSE BLD-MCNC: 89 MG/DL (ref 70–99)
GLUCOSE BLD-MCNC: 90 MG/DL (ref 70–99)
GLUCOSE BLD-MCNC: 90 MG/DL (ref 70–99)
GLUCOSE BLD-MCNC: 91 MG/DL (ref 70–99)
GLUCOSE BLD-MCNC: 92 MG/DL (ref 70–99)
GLUCOSE BLD-MCNC: 93 MG/DL (ref 70–99)
GLUCOSE BLD-MCNC: 95 MG/DL (ref 70–99)
GLUCOSE BLD-MCNC: 95 MG/DL (ref 70–99)
GLUCOSE BLD-MCNC: 96 MG/DL (ref 70–99)
GLUCOSE BLD-MCNC: 97 MG/DL (ref 70–99)
GLUCOSE BLD-MCNC: 98 MG/DL (ref 70–99)
GLUCOSE BLD-MCNC: 99 MG/DL (ref 70–99)
GLUCOSE BLD-MCNC: 99 MG/DL (ref 70–99)
GRAM STAIN RESULT: NORMAL
HAPTOGLOBIN: <10 MG/DL (ref 30–200)
HAV IGM SER IA-ACNC: NORMAL
HBV SURFACE AB TITR SER: 376.5 MIU/ML
HCO3 ARTERIAL: 15.8 MMOL/L (ref 21–29)
HCO3 ARTERIAL: 17.2 MMOL/L (ref 21–29)
HCO3 ARTERIAL: 17.5 MMOL/L (ref 21–29)
HCO3 ARTERIAL: 17.5 MMOL/L (ref 21–29)
HCO3 ARTERIAL: 17.7 MMOL/L (ref 21–29)
HCO3 ARTERIAL: 20.1 MMOL/L (ref 21–29)
HCO3 ARTERIAL: 20.9 MMOL/L (ref 21–29)
HCO3 ARTERIAL: 21 MMOL/L (ref 21–29)
HCO3 ARTERIAL: 21 MMOL/L (ref 21–29)
HCO3 ARTERIAL: 21.4 MMOL/L (ref 21–29)
HCO3 ARTERIAL: 22 MMOL/L (ref 21–29)
HCO3 ARTERIAL: 22.4 MMOL/L (ref 21–29)
HCO3 ARTERIAL: 22.8 MMOL/L (ref 21–29)
HCO3 ARTERIAL: 23 MMOL/L (ref 21–29)
HCO3 ARTERIAL: 23.1 MMOL/L (ref 21–29)
HCO3 ARTERIAL: 23.1 MMOL/L (ref 21–29)
HCO3 ARTERIAL: 23.4 MMOL/L (ref 21–29)
HCO3 ARTERIAL: 23.7 MMOL/L (ref 21–29)
HCO3 ARTERIAL: 23.7 MMOL/L (ref 21–29)
HCO3 ARTERIAL: 23.9 MMOL/L (ref 21–29)
HCO3 ARTERIAL: 23.9 MMOL/L (ref 21–29)
HCO3 ARTERIAL: 24 MMOL/L (ref 21–29)
HCO3 ARTERIAL: 24 MMOL/L (ref 21–29)
HCO3 ARTERIAL: 24.1 MMOL/L (ref 21–29)
HCO3 ARTERIAL: 24.3 MMOL/L (ref 21–29)
HCO3 ARTERIAL: 24.3 MMOL/L (ref 21–29)
HCO3 ARTERIAL: 24.4 MMOL/L (ref 21–29)
HCO3 ARTERIAL: 24.5 MMOL/L (ref 21–29)
HCO3 ARTERIAL: 24.5 MMOL/L (ref 21–29)
HCO3 ARTERIAL: 24.6 MMOL/L (ref 21–29)
HCO3 ARTERIAL: 24.7 MMOL/L (ref 21–29)
HCO3 ARTERIAL: 24.8 MMOL/L (ref 21–29)
HCO3 ARTERIAL: 24.8 MMOL/L (ref 21–29)
HCO3 ARTERIAL: 25 MMOL/L (ref 21–29)
HCO3 ARTERIAL: 25.4 MMOL/L (ref 21–29)
HCO3 ARTERIAL: 25.5 MMOL/L (ref 21–29)
HCO3 ARTERIAL: 25.6 MMOL/L (ref 21–29)
HCO3 ARTERIAL: 26 MMOL/L (ref 21–29)
HCO3 ARTERIAL: 26.2 MMOL/L (ref 21–29)
HCO3 ARTERIAL: 26.2 MMOL/L (ref 21–29)
HCO3 ARTERIAL: 26.3 MMOL/L (ref 21–29)
HCO3 ARTERIAL: 26.5 MMOL/L (ref 21–29)
HCO3 ARTERIAL: 26.5 MMOL/L (ref 21–29)
HCO3 ARTERIAL: 26.6 MMOL/L (ref 21–29)
HCO3 ARTERIAL: 26.7 MMOL/L (ref 21–29)
HCO3 ARTERIAL: 26.8 MMOL/L (ref 21–29)
HCO3 ARTERIAL: 26.9 MMOL/L (ref 21–29)
HCO3 ARTERIAL: 26.9 MMOL/L (ref 21–29)
HCO3 ARTERIAL: 27 MMOL/L (ref 21–29)
HCO3 ARTERIAL: 27.2 MMOL/L (ref 21–29)
HCO3 ARTERIAL: 27.3 MMOL/L (ref 21–29)
HCO3 ARTERIAL: 27.3 MMOL/L (ref 21–29)
HCO3 ARTERIAL: 27.4 MMOL/L (ref 21–29)
HCO3 ARTERIAL: 27.5 MMOL/L (ref 21–29)
HCO3 ARTERIAL: 27.6 MMOL/L (ref 21–29)
HCO3 ARTERIAL: 27.9 MMOL/L (ref 21–29)
HCO3 ARTERIAL: 28 MMOL/L (ref 21–29)
HCO3 ARTERIAL: 28.2 MMOL/L (ref 21–29)
HCO3 ARTERIAL: 28.2 MMOL/L (ref 21–29)
HCO3 ARTERIAL: 28.4 MMOL/L (ref 21–29)
HCO3 ARTERIAL: 28.6 MMOL/L (ref 21–29)
HCO3 ARTERIAL: 28.8 MMOL/L (ref 21–29)
HCO3 ARTERIAL: 29.1 MMOL/L (ref 21–29)
HCO3 ARTERIAL: 29.2 MMOL/L (ref 21–29)
HCO3 ARTERIAL: 29.3 MMOL/L (ref 21–29)
HCO3 ARTERIAL: 29.5 MMOL/L (ref 21–29)
HCO3 ARTERIAL: 29.5 MMOL/L (ref 21–29)
HCO3 ARTERIAL: 30.1 MMOL/L (ref 21–29)
HCO3 ARTERIAL: 30.5 MMOL/L (ref 21–29)
HCO3 ARTERIAL: 30.6 MMOL/L (ref 21–29)
HCO3 ARTERIAL: 30.7 MMOL/L (ref 21–29)
HCO3 ARTERIAL: 31 MMOL/L (ref 21–29)
HCO3 ARTERIAL: 32 MMOL/L (ref 21–29)
HCO3 VENOUS: 27.1 MMOL/L (ref 23–29)
HCO3 VENOUS: 33 MMOL/L (ref 23–29)
HCT VFR BLD CALC: 17 % (ref 40.5–52.5)
HCT VFR BLD CALC: 18.4 % (ref 40.5–52.5)
HCT VFR BLD CALC: 22.6 % (ref 40.5–52.5)
HCT VFR BLD CALC: 23 % (ref 40.5–52.5)
HCT VFR BLD CALC: 24 % (ref 40.5–52.5)
HCT VFR BLD CALC: 26.6 % (ref 40.5–52.5)
HCT VFR BLD CALC: 27.2 % (ref 40.5–52.5)
HCT VFR BLD CALC: 29.7 % (ref 40.5–52.5)
HCT VFR BLD CALC: 31.4 % (ref 40.5–52.5)
HCT VFR BLD CALC: 31.9 % (ref 40.5–52.5)
HCT VFR BLD CALC: 32.1 % (ref 40.5–52.5)
HCT VFR BLD CALC: 33.4 % (ref 40.5–52.5)
HCT VFR BLD CALC: 33.7 % (ref 40.5–52.5)
HCT VFR BLD CALC: 33.7 % (ref 40.5–52.5)
HCT VFR BLD CALC: 35.2 % (ref 40.5–52.5)
HCT VFR BLD CALC: 38.3 % (ref 40.5–52.5)
HCT VFR BLD CALC: 38.4 % (ref 40.5–52.5)
HCT VFR BLD CALC: 38.5 % (ref 40.5–52.5)
HCT VFR BLD CALC: 38.8 % (ref 40.5–52.5)
HCT VFR BLD CALC: 42.8 % (ref 40.5–52.5)
HCT VFR BLD CALC: 49.2 % (ref 40.5–52.5)
HEMATOLOGY PATH CONSULT: NO
HEMATOLOGY PATH CONSULT: NORMAL
HEMATOLOGY PATH CONSULT: YES
HEMOGLOBIN, ART, EXTENDED: 10.5 G/DL (ref 13.5–17.5)
HEMOGLOBIN, ART, EXTENDED: 10.8 G/DL (ref 13.5–17.5)
HEMOGLOBIN, ART, EXTENDED: 10.8 G/DL (ref 13.5–17.5)
HEMOGLOBIN, ART, EXTENDED: 11 G/DL (ref 13.5–17.5)
HEMOGLOBIN, ART, EXTENDED: 11 G/DL (ref 13.5–17.5)
HEMOGLOBIN, ART, EXTENDED: 11.1 G/DL (ref 13.5–17.5)
HEMOGLOBIN, ART, EXTENDED: 11.2 G/DL (ref 13.5–17.5)
HEMOGLOBIN, ART, EXTENDED: 11.3 G/DL (ref 13.5–17.5)
HEMOGLOBIN, ART, EXTENDED: 11.3 G/DL (ref 13.5–17.5)
HEMOGLOBIN, ART, EXTENDED: 11.4 G/DL (ref 13.5–17.5)
HEMOGLOBIN, ART, EXTENDED: 11.8 G/DL (ref 13.5–17.5)
HEMOGLOBIN, ART, EXTENDED: 12.3 G/DL (ref 13.5–17.5)
HEMOGLOBIN, ART, EXTENDED: 12.4 G/DL (ref 13.5–17.5)
HEMOGLOBIN, ART, EXTENDED: 12.5 G/DL (ref 13.5–17.5)
HEMOGLOBIN, ART, EXTENDED: 12.6 G/DL (ref 13.5–17.5)
HEMOGLOBIN, ART, EXTENDED: 12.8 G/DL (ref 13.5–17.5)
HEMOGLOBIN, ART, EXTENDED: 12.9 G/DL (ref 13.5–17.5)
HEMOGLOBIN, ART, EXTENDED: 5 G/DL (ref 13.5–17.5)
HEMOGLOBIN, ART, EXTENDED: 5.1 G/DL (ref 13.5–17.5)
HEMOGLOBIN, ART, EXTENDED: 5.2 G/DL (ref 13.5–17.5)
HEMOGLOBIN, ART, EXTENDED: 5.9 G/DL (ref 13.5–17.5)
HEMOGLOBIN, ART, EXTENDED: 6.5 G/DL (ref 13.5–17.5)
HEMOGLOBIN, ART, EXTENDED: 6.7 G/DL (ref 13.5–17.5)
HEMOGLOBIN, ART, EXTENDED: 7 G/DL (ref 13.5–17.5)
HEMOGLOBIN, ART, EXTENDED: 7.3 G/DL (ref 13.5–17.5)
HEMOGLOBIN, ART, EXTENDED: 7.7 G/DL (ref 13.5–17.5)
HEMOGLOBIN, ART, EXTENDED: 8.3 G/DL (ref 13.5–17.5)
HEMOGLOBIN, ART, EXTENDED: 8.7 G/DL (ref 13.5–17.5)
HEMOGLOBIN, ART, EXTENDED: 8.8 G/DL (ref 13.5–17.5)
HEMOGLOBIN, ART, EXTENDED: 8.9 G/DL (ref 13.5–17.5)
HEMOGLOBIN, ART, EXTENDED: 9 G/DL (ref 13.5–17.5)
HEMOGLOBIN, ART, EXTENDED: 9.5 G/DL (ref 13.5–17.5)
HEMOGLOBIN: 10.1 G/DL (ref 13.5–17.5)
HEMOGLOBIN: 10.2 G/DL (ref 13.5–17.5)
HEMOGLOBIN: 10.4 G/DL (ref 13.5–17.5)
HEMOGLOBIN: 10.6 G/DL (ref 13.5–17.5)
HEMOGLOBIN: 10.7 G/DL (ref 13.5–17.5)
HEMOGLOBIN: 10.8 G/DL (ref 13.5–17.5)
HEMOGLOBIN: 11 G/DL (ref 13.5–17.5)
HEMOGLOBIN: 12 G/DL (ref 13.5–17.5)
HEMOGLOBIN: 12.2 G/DL (ref 13.5–17.5)
HEMOGLOBIN: 12.5 G/DL (ref 13.5–17.5)
HEMOGLOBIN: 12.5 G/DL (ref 13.5–17.5)
HEMOGLOBIN: 12.7 GM/DL (ref 13.5–17.5)
HEMOGLOBIN: 13.5 GM/DL (ref 13.5–17.5)
HEMOGLOBIN: 13.7 GM/DL (ref 13.5–17.5)
HEMOGLOBIN: 13.7 GM/DL (ref 13.5–17.5)
HEMOGLOBIN: 13.8 G/DL (ref 13.5–17.5)
HEMOGLOBIN: 13.9 GM/DL (ref 13.5–17.5)
HEMOGLOBIN: 14 GM/DL (ref 13.5–17.5)
HEMOGLOBIN: 14.4 GM/DL (ref 13.5–17.5)
HEMOGLOBIN: 14.4 GM/DL (ref 13.5–17.5)
HEMOGLOBIN: 14.7 GM/DL (ref 13.5–17.5)
HEMOGLOBIN: 14.7 GM/DL (ref 13.5–17.5)
HEMOGLOBIN: 15.2 GM/DL (ref 13.5–17.5)
HEMOGLOBIN: 15.6 GM/DL (ref 13.5–17.5)
HEMOGLOBIN: 16 G/DL (ref 13.5–17.5)
HEMOGLOBIN: 16.5 GM/DL (ref 13.5–17.5)
HEMOGLOBIN: 17.1 GM/DL (ref 13.5–17.5)
HEMOGLOBIN: 3.7 GM/DL (ref 13.5–17.5)
HEMOGLOBIN: 5.8 G/DL (ref 13.5–17.5)
HEMOGLOBIN: 6.1 GM/DL (ref 13.5–17.5)
HEMOGLOBIN: 6.5 G/DL (ref 13.5–17.5)
HEMOGLOBIN: 6.7 GM/DL (ref 13.5–17.5)
HEMOGLOBIN: 6.7 GM/DL (ref 13.5–17.5)
HEMOGLOBIN: 7.4 G/DL (ref 13.5–17.5)
HEMOGLOBIN: 7.4 GM/DL (ref 13.5–17.5)
HEMOGLOBIN: 7.6 GM/DL (ref 13.5–17.5)
HEMOGLOBIN: 7.7 G/DL (ref 13.5–17.5)
HEMOGLOBIN: 7.9 G/DL (ref 13.5–17.5)
HEMOGLOBIN: 8.8 G/DL (ref 13.5–17.5)
HEMOGLOBIN: 9 G/DL (ref 13.5–17.5)
HEMOGLOBIN: 9.6 G/DL (ref 13.5–17.5)
HEPATITIS B CORE IGM ANTIBODY: NORMAL
HEPATITIS B CORE TOTAL ANTIBODY: NEGATIVE
HEPATITIS B SURFACE ANTIGEN INTERPRETATION: NORMAL
HEPATITIS B SURFACE ANTIGEN INTERPRETATION: NORMAL
HEPATITIS C ANTIBODY INTERPRETATION: NORMAL
HYPOCHROMIA: ABNORMAL
HYPOCHROMIA: ABNORMAL
IMMATURE RETIC FRACT: 0.24 (ref 0.21–0.37)
INR BLD: 0.74 (ref 0.86–1.14)
INR BLD: 1 (ref 0.86–1.14)
INR BLD: 1.03 (ref 0.86–1.14)
INR BLD: 1.05 (ref 0.86–1.14)
INR BLD: 1.08 (ref 0.86–1.14)
INR BLD: 1.12 (ref 0.86–1.14)
INR BLD: 1.13 (ref 0.86–1.14)
INR BLD: 1.23 (ref 0.86–1.14)
INR BLD: 1.26 (ref 0.86–1.14)
INR BLD: 1.35 (ref 0.86–1.14)
INR BLD: 1.41 (ref 0.86–1.14)
INR BLD: 1.66 (ref 0.86–1.14)
INR BLD: 1.68 (ref 0.86–1.14)
INR BLD: 1.81 (ref 0.86–1.14)
INR BLD: 1.97 (ref 0.86–1.14)
INR BLD: 2.03 (ref 0.86–1.14)
INR BLD: 2.33 (ref 0.86–1.14)
INR BLD: 3.17 (ref 0.86–1.14)
LACTATE DEHYDROGENASE: >2500 U/L (ref 100–190)
LACTATE: 1 MMOL/L (ref 0.4–2)
LACTATE: 1.65 MMOL/L (ref 0.4–2)
LACTATE: 1.89 MMOL/L (ref 0.4–2)
LACTATE: 1.99 MMOL/L (ref 0.4–2)
LACTATE: 11.69 MMOL/L (ref 0.4–2)
LACTATE: 12.88 MMOL/L (ref 0.4–2)
LACTATE: 14.73 MMOL/L (ref 0.4–2)
LACTATE: 17.01 MMOL/L (ref 0.4–2)
LACTATE: 2 MMOL/L (ref 0.4–2)
LACTATE: 2.74 MMOL/L (ref 0.4–2)
LACTATE: 3.5 MMOL/L (ref 0.4–2)
LACTATE: 4.56 MMOL/L (ref 0.4–2)
LACTATE: 5.71 MMOL/L (ref 0.4–2)
LACTATE: 6.13 MMOL/L (ref 0.4–2)
LACTATE: 6.64 MMOL/L (ref 0.4–2)
LACTATE: 6.77 MMOL/L (ref 0.4–2)
LACTATE: 7.24 MMOL/L (ref 0.4–2)
LACTATE: 7.25 MMOL/L (ref 0.4–2)
LACTATE: 7.3 MMOL/L (ref 0.4–2)
LACTATE: 7.36 MMOL/L (ref 0.4–2)
LACTATE: 7.45 MMOL/L (ref 0.4–2)
LACTATE: 9.43 MMOL/L (ref 0.4–2)
LACTATE: >20 MMOL/L (ref 0.4–2)
LACTIC ACID: 1.4 MMOL/L (ref 0.4–2)
LACTIC ACID: 1.9 MMOL/L (ref 0.4–2)
LACTIC ACID: 10.3 MMOL/L (ref 0.4–2)
LACTIC ACID: 11.8 MMOL/L (ref 0.4–2)
LACTIC ACID: 12.7 MMOL/L (ref 0.4–2)
LACTIC ACID: 13.9 MMOL/L (ref 0.4–2)
LACTIC ACID: 2.1 MMOL/L (ref 0.4–2)
LACTIC ACID: 2.4 MMOL/L (ref 0.4–2)
LACTIC ACID: 27.4 MMOL/L (ref 0.4–2)
LACTIC ACID: 7.5 MMOL/L (ref 0.4–2)
LACTIC ACID: 9.4 MMOL/L (ref 0.4–2)
LIPASE: 73 U/L (ref 13–60)
LYMPHOCYTES ABSOLUTE: 0.3 K/UL (ref 1–5.1)
LYMPHOCYTES ABSOLUTE: 0.4 K/UL (ref 1–5.1)
LYMPHOCYTES ABSOLUTE: 0.6 K/UL (ref 1–5.1)
LYMPHOCYTES ABSOLUTE: 0.6 K/UL (ref 1–5.1)
LYMPHOCYTES ABSOLUTE: 1.3 K/UL (ref 1–5.1)
LYMPHOCYTES ABSOLUTE: 1.6 K/UL (ref 1–5.1)
LYMPHOCYTES ABSOLUTE: 1.6 K/UL (ref 1–5.1)
LYMPHOCYTES ABSOLUTE: 1.7 K/UL (ref 1–5.1)
LYMPHOCYTES ABSOLUTE: 2 K/UL (ref 1–5.1)
LYMPHOCYTES ABSOLUTE: 2.2 K/UL (ref 1–5.1)
LYMPHOCYTES ABSOLUTE: 2.6 K/UL (ref 1–5.1)
LYMPHOCYTES ABSOLUTE: 3 K/UL (ref 1–5.1)
LYMPHOCYTES RELATIVE PERCENT: 1.9 %
LYMPHOCYTES RELATIVE PERCENT: 18 %
LYMPHOCYTES RELATIVE PERCENT: 2.2 %
LYMPHOCYTES RELATIVE PERCENT: 21.3 %
LYMPHOCYTES RELATIVE PERCENT: 3 %
LYMPHOCYTES RELATIVE PERCENT: 4 %
LYMPHOCYTES RELATIVE PERCENT: 4 %
LYMPHOCYTES RELATIVE PERCENT: 5.7 %
LYMPHOCYTES RELATIVE PERCENT: 7 %
LYMPHOCYTES RELATIVE PERCENT: 7 %
LYMPHOCYTES RELATIVE PERCENT: 8.6 %
LYMPHOCYTES RELATIVE PERCENT: 9 %
Lab: ABNORMAL
MAGNESIUM: 2 MG/DL (ref 1.8–2.4)
MAGNESIUM: 2.2 MG/DL (ref 1.8–2.4)
MAGNESIUM: 2.3 MG/DL (ref 1.8–2.4)
MAGNESIUM: 2.4 MG/DL (ref 1.8–2.4)
MAGNESIUM: 2.5 MG/DL (ref 1.8–2.4)
MAGNESIUM: 2.6 MG/DL (ref 1.8–2.4)
MAGNESIUM: 2.7 MG/DL (ref 1.8–2.4)
MAGNESIUM: 2.7 MG/DL (ref 1.8–2.4)
MAGNESIUM: 2.8 MG/DL (ref 1.8–2.4)
MAGNESIUM: 2.9 MG/DL (ref 1.8–2.4)
MCH RBC QN AUTO: 27.8 PG (ref 26–34)
MCH RBC QN AUTO: 28 PG (ref 26–34)
MCH RBC QN AUTO: 28 PG (ref 26–34)
MCH RBC QN AUTO: 28.1 PG (ref 26–34)
MCH RBC QN AUTO: 28.1 PG (ref 26–34)
MCH RBC QN AUTO: 28.2 PG (ref 26–34)
MCH RBC QN AUTO: 28.3 PG (ref 26–34)
MCH RBC QN AUTO: 28.3 PG (ref 26–34)
MCH RBC QN AUTO: 28.4 PG (ref 26–34)
MCH RBC QN AUTO: 28.7 PG (ref 26–34)
MCH RBC QN AUTO: 28.8 PG (ref 26–34)
MCH RBC QN AUTO: 28.9 PG (ref 26–34)
MCH RBC QN AUTO: 29.8 PG (ref 26–34)
MCH RBC QN AUTO: 30.2 PG (ref 26–34)
MCH RBC QN AUTO: 30.3 PG (ref 26–34)
MCH RBC QN AUTO: 31.6 PG (ref 26–34)
MCHC RBC AUTO-ENTMCNC: 31.3 G/DL (ref 31–36)
MCHC RBC AUTO-ENTMCNC: 31.4 G/DL (ref 31–36)
MCHC RBC AUTO-ENTMCNC: 31.5 G/DL (ref 31–36)
MCHC RBC AUTO-ENTMCNC: 31.8 G/DL (ref 31–36)
MCHC RBC AUTO-ENTMCNC: 31.8 G/DL (ref 31–36)
MCHC RBC AUTO-ENTMCNC: 32 G/DL (ref 31–36)
MCHC RBC AUTO-ENTMCNC: 32.1 G/DL (ref 31–36)
MCHC RBC AUTO-ENTMCNC: 32.2 G/DL (ref 31–36)
MCHC RBC AUTO-ENTMCNC: 32.3 G/DL (ref 31–36)
MCHC RBC AUTO-ENTMCNC: 32.3 G/DL (ref 31–36)
MCHC RBC AUTO-ENTMCNC: 32.4 G/DL (ref 31–36)
MCHC RBC AUTO-ENTMCNC: 32.7 G/DL (ref 31–36)
MCHC RBC AUTO-ENTMCNC: 33.6 G/DL (ref 31–36)
MCHC RBC AUTO-ENTMCNC: 34 G/DL (ref 31–36)
MCHC RBC AUTO-ENTMCNC: 34.2 G/DL (ref 31–36)
MCV RBC AUTO: 86.9 FL (ref 80–100)
MCV RBC AUTO: 86.9 FL (ref 80–100)
MCV RBC AUTO: 87.3 FL (ref 80–100)
MCV RBC AUTO: 87.5 FL (ref 80–100)
MCV RBC AUTO: 87.7 FL (ref 80–100)
MCV RBC AUTO: 87.8 FL (ref 80–100)
MCV RBC AUTO: 87.9 FL (ref 80–100)
MCV RBC AUTO: 87.9 FL (ref 80–100)
MCV RBC AUTO: 88.4 FL (ref 80–100)
MCV RBC AUTO: 88.6 FL (ref 80–100)
MCV RBC AUTO: 89 FL (ref 80–100)
MCV RBC AUTO: 89.1 FL (ref 80–100)
MCV RBC AUTO: 89.2 FL (ref 80–100)
MCV RBC AUTO: 89.3 FL (ref 80–100)
MCV RBC AUTO: 89.9 FL (ref 80–100)
MCV RBC AUTO: 92.2 FL (ref 80–100)
MCV RBC AUTO: 92.7 FL (ref 80–100)
METAMYELOCYTES RELATIVE PERCENT: 2 %
METAMYELOCYTES RELATIVE PERCENT: 3 %
METAMYELOCYTES RELATIVE PERCENT: 4 %
METAMYELOCYTES RELATIVE PERCENT: 5 %
METHADONE SCREEN, URINE: ABNORMAL
METHEMOGLOBIN ARTERIAL: 0.1 %
METHEMOGLOBIN ARTERIAL: 0.2 %
METHEMOGLOBIN ARTERIAL: 0.3 %
METHEMOGLOBIN ARTERIAL: 0.4 %
METHEMOGLOBIN ARTERIAL: 0.5 %
METHEMOGLOBIN ARTERIAL: 0.6 %
METHEMOGLOBIN ARTERIAL: 0.7 %
METHEMOGLOBIN ARTERIAL: 0.7 %
METHEMOGLOBIN ARTERIAL: 0.8 %
METHEMOGLOBIN ARTERIAL: 10.1 %
METHEMOGLOBIN ARTERIAL: 11.9 %
METHEMOGLOBIN ARTERIAL: 4.5 %
METHEMOGLOBIN ARTERIAL: 5.3 %
METHEMOGLOBIN ARTERIAL: 5.4 %
METHEMOGLOBIN ARTERIAL: 5.8 %
METHEMOGLOBIN ARTERIAL: 5.8 %
METHEMOGLOBIN ARTERIAL: 5.9 %
METHEMOGLOBIN ARTERIAL: 6.1 %
METHEMOGLOBIN ARTERIAL: 7.8 %
METHEMOGLOBIN ARTERIAL: 8.8 %
METHEMOGLOBIN ARTERIAL: 9.3 %
METHEMOGLOBIN VENOUS: 5.2 %
MICROCYTES: ABNORMAL
MONOCYTES ABSOLUTE: 0.5 K/UL (ref 0–1.3)
MONOCYTES ABSOLUTE: 0.7 K/UL (ref 0–1.3)
MONOCYTES ABSOLUTE: 0.8 K/UL (ref 0–1.3)
MONOCYTES ABSOLUTE: 0.9 K/UL (ref 0–1.3)
MONOCYTES ABSOLUTE: 0.9 K/UL (ref 0–1.3)
MONOCYTES ABSOLUTE: 1.1 K/UL (ref 0–1.3)
MONOCYTES ABSOLUTE: 1.1 K/UL (ref 0–1.3)
MONOCYTES ABSOLUTE: 1.2 K/UL (ref 0–1.3)
MONOCYTES ABSOLUTE: 1.4 K/UL (ref 0–1.3)
MONOCYTES ABSOLUTE: 1.5 K/UL (ref 0–1.3)
MONOCYTES ABSOLUTE: 2.3 K/UL (ref 0–1.3)
MONOCYTES ABSOLUTE: 6 K/UL (ref 0–1.3)
MONOCYTES RELATIVE PERCENT: 11.8 %
MONOCYTES RELATIVE PERCENT: 13.7 %
MONOCYTES RELATIVE PERCENT: 2 %
MONOCYTES RELATIVE PERCENT: 2.7 %
MONOCYTES RELATIVE PERCENT: 3 %
MONOCYTES RELATIVE PERCENT: 4 %
MONOCYTES RELATIVE PERCENT: 6 %
MONOCYTES RELATIVE PERCENT: 6.1 %
MONOCYTES RELATIVE PERCENT: 7 %
MONOCYTES RELATIVE PERCENT: 8 %
MONOCYTES RELATIVE PERCENT: 8.3 %
MONOCYTES RELATIVE PERCENT: 9.4 %
MRSA SCREEN RT-PCR: NORMAL
MYELOCYTE PERCENT: 10 %
MYELOCYTE PERCENT: 2 %
MYELOCYTE PERCENT: 5 %
MYELOCYTE PERCENT: 9 %
NEUTROPHILS ABSOLUTE: 12.2 K/UL (ref 1.7–7.7)
NEUTROPHILS ABSOLUTE: 12.6 K/UL (ref 1.7–7.7)
NEUTROPHILS ABSOLUTE: 17.1 K/UL (ref 1.7–7.7)
NEUTROPHILS ABSOLUTE: 17.6 K/UL (ref 1.7–7.7)
NEUTROPHILS ABSOLUTE: 20.3 K/UL (ref 1.7–7.7)
NEUTROPHILS ABSOLUTE: 49.8 K/UL (ref 1.7–7.7)
NEUTROPHILS ABSOLUTE: 5 K/UL (ref 1.7–7.7)
NEUTROPHILS ABSOLUTE: 6.9 K/UL (ref 1.7–7.7)
NEUTROPHILS ABSOLUTE: 69.9 K/UL (ref 1.7–7.7)
NEUTROPHILS ABSOLUTE: 7.8 K/UL (ref 1.7–7.7)
NEUTROPHILS ABSOLUTE: 77 K/UL (ref 1.7–7.7)
NEUTROPHILS ABSOLUTE: 8.3 K/UL (ref 1.7–7.7)
NEUTROPHILS RELATIVE PERCENT: 46 %
NEUTROPHILS RELATIVE PERCENT: 62 %
NEUTROPHILS RELATIVE PERCENT: 66.2 %
NEUTROPHILS RELATIVE PERCENT: 71 %
NEUTROPHILS RELATIVE PERCENT: 71 %
NEUTROPHILS RELATIVE PERCENT: 72 %
NEUTROPHILS RELATIVE PERCENT: 75.1 %
NEUTROPHILS RELATIVE PERCENT: 75.7 %
NEUTROPHILS RELATIVE PERCENT: 85 %
NEUTROPHILS RELATIVE PERCENT: 87.9 %
NEUTROPHILS RELATIVE PERCENT: 89.4 %
NEUTROPHILS RELATIVE PERCENT: 92 %
NUCLEATED RED BLOOD CELLS: 5 /100 WBC
NUCLEATED RED BLOOD CELLS: 5 /100 WBC
NUCLEATED RED BLOOD CELLS: 6 /100 WBC
NUCLEATED RED BLOOD CELLS: 6 /100 WBC
NUCLEATED RED BLOOD CELLS: 8 /100 WBC
NUCLEATED RED BLOOD CELLS: 8 /100 WBC
O2 CONTENT ARTERIAL: 10 ML/DL
O2 CONTENT ARTERIAL: 11 ML/DL
O2 CONTENT ARTERIAL: 12 ML/DL
O2 CONTENT ARTERIAL: 12 ML/DL
O2 CONTENT ARTERIAL: 13 ML/DL
O2 CONTENT ARTERIAL: 14 ML/DL
O2 CONTENT ARTERIAL: 15 ML/DL
O2 CONTENT ARTERIAL: 16 ML/DL
O2 CONTENT ARTERIAL: 17 ML/DL
O2 CONTENT ARTERIAL: 6 ML/DL
O2 CONTENT ARTERIAL: 7 ML/DL
O2 CONTENT ARTERIAL: 8 ML/DL
O2 CONTENT ARTERIAL: 8 ML/DL
O2 CONTENT ARTERIAL: 9 ML/DL
O2 CONTENT, VEN: 2 ML/DL
O2 SAT, ARTERIAL: 100 %
O2 SAT, ARTERIAL: 100 %
O2 SAT, ARTERIAL: 100 % (ref 93–100)
O2 SAT, ARTERIAL: 64 % (ref 93–100)
O2 SAT, ARTERIAL: 67.5 %
O2 SAT, ARTERIAL: 72 % (ref 93–100)
O2 SAT, ARTERIAL: 72 % (ref 93–100)
O2 SAT, ARTERIAL: 77 % (ref 93–100)
O2 SAT, ARTERIAL: 80.8 %
O2 SAT, ARTERIAL: 83 % (ref 93–100)
O2 SAT, ARTERIAL: 83.5 %
O2 SAT, ARTERIAL: 84 % (ref 93–100)
O2 SAT, ARTERIAL: 84.2 %
O2 SAT, ARTERIAL: 84.6 %
O2 SAT, ARTERIAL: 85.4 %
O2 SAT, ARTERIAL: 86 % (ref 93–100)
O2 SAT, ARTERIAL: 86.2 %
O2 SAT, ARTERIAL: 87 % (ref 93–100)
O2 SAT, ARTERIAL: 88.3 %
O2 SAT, ARTERIAL: 89.2 %
O2 SAT, ARTERIAL: 89.2 %
O2 SAT, ARTERIAL: 89.5 %
O2 SAT, ARTERIAL: 90 % (ref 93–100)
O2 SAT, ARTERIAL: 91 % (ref 93–100)
O2 SAT, ARTERIAL: 91.4 %
O2 SAT, ARTERIAL: 91.6 %
O2 SAT, ARTERIAL: 92 %
O2 SAT, ARTERIAL: 92 % (ref 93–100)
O2 SAT, ARTERIAL: 92 % (ref 93–100)
O2 SAT, ARTERIAL: 92.8 %
O2 SAT, ARTERIAL: 93.5 %
O2 SAT, ARTERIAL: 93.5 %
O2 SAT, ARTERIAL: 93.8 %
O2 SAT, ARTERIAL: 94 % (ref 93–100)
O2 SAT, ARTERIAL: 94 % (ref 93–100)
O2 SAT, ARTERIAL: 94.8 %
O2 SAT, ARTERIAL: 95 % (ref 93–100)
O2 SAT, ARTERIAL: 95.3 %
O2 SAT, ARTERIAL: 95.3 %
O2 SAT, ARTERIAL: 96 % (ref 93–100)
O2 SAT, ARTERIAL: 96.1 %
O2 SAT, ARTERIAL: 97 % (ref 93–100)
O2 SAT, ARTERIAL: 97 % (ref 93–100)
O2 SAT, ARTERIAL: 97.1 %
O2 SAT, ARTERIAL: 97.4 %
O2 SAT, ARTERIAL: 98 %
O2 SAT, ARTERIAL: 98 % (ref 93–100)
O2 SAT, ARTERIAL: 98 % (ref 93–100)
O2 SAT, ARTERIAL: 98.2 %
O2 SAT, ARTERIAL: 98.7 %
O2 SAT, ARTERIAL: 98.7 %
O2 SAT, ARTERIAL: 98.9 %
O2 SAT, ARTERIAL: 98.9 %
O2 SAT, ARTERIAL: 99 % (ref 93–100)
O2 SAT, ARTERIAL: 99.2 %
O2 SAT, ARTERIAL: 99.4 %
O2 SAT, ARTERIAL: 99.5 %
O2 SAT, ARTERIAL: 99.6 %
O2 SAT, VEN: 20 %
O2 SAT, VEN: 54 %
O2 THERAPY: ABNORMAL
OPIATE SCREEN URINE: ABNORMAL
OVALOCYTES: ABNORMAL
OXYCODONE URINE: ABNORMAL
PCO2 ARTERIAL: 126.3 MM HG (ref 35–45)
PCO2 ARTERIAL: 30.1 MM HG (ref 35–45)
PCO2 ARTERIAL: 31.1 MM HG (ref 35–45)
PCO2 ARTERIAL: 33.1 MMHG (ref 35–45)
PCO2 ARTERIAL: 33.4 MM HG (ref 35–45)
PCO2 ARTERIAL: 34.5 MMHG (ref 35–45)
PCO2 ARTERIAL: 34.6 MM HG (ref 35–45)
PCO2 ARTERIAL: 34.7 MMHG (ref 35–45)
PCO2 ARTERIAL: 35 MM HG (ref 35–45)
PCO2 ARTERIAL: 35.1 MMHG (ref 35–45)
PCO2 ARTERIAL: 35.6 MM HG (ref 35–45)
PCO2 ARTERIAL: 36.9 MM HG (ref 35–45)
PCO2 ARTERIAL: 38.3 MM HG (ref 35–45)
PCO2 ARTERIAL: 38.4 MM HG (ref 35–45)
PCO2 ARTERIAL: 38.5 MM HG (ref 35–45)
PCO2 ARTERIAL: 39.3 MMHG (ref 35–45)
PCO2 ARTERIAL: 39.6 MMHG (ref 35–45)
PCO2 ARTERIAL: 39.9 MMHG (ref 35–45)
PCO2 ARTERIAL: 40.1 MMHG (ref 35–45)
PCO2 ARTERIAL: 40.2 MM HG (ref 35–45)
PCO2 ARTERIAL: 40.9 MM HG (ref 35–45)
PCO2 ARTERIAL: 41.8 MM HG (ref 35–45)
PCO2 ARTERIAL: 41.8 MMHG (ref 35–45)
PCO2 ARTERIAL: 41.8 MMHG (ref 35–45)
PCO2 ARTERIAL: 42.5 MMHG (ref 35–45)
PCO2 ARTERIAL: 42.6 MMHG (ref 35–45)
PCO2 ARTERIAL: 42.8 MM HG (ref 35–45)
PCO2 ARTERIAL: 43 MM HG (ref 35–45)
PCO2 ARTERIAL: 43 MM HG (ref 35–45)
PCO2 ARTERIAL: 43.1 MMHG (ref 35–45)
PCO2 ARTERIAL: 43.5 MMHG (ref 35–45)
PCO2 ARTERIAL: 43.9 MMHG (ref 35–45)
PCO2 ARTERIAL: 44 MM HG (ref 35–45)
PCO2 ARTERIAL: 44.1 MM HG (ref 35–45)
PCO2 ARTERIAL: 44.4 MM HG (ref 35–45)
PCO2 ARTERIAL: 44.5 MM HG (ref 35–45)
PCO2 ARTERIAL: 44.6 MMHG (ref 35–45)
PCO2 ARTERIAL: 44.7 MMHG (ref 35–45)
PCO2 ARTERIAL: 45.8 MMHG (ref 35–45)
PCO2 ARTERIAL: 46.1 MM HG (ref 35–45)
PCO2 ARTERIAL: 46.3 MMHG (ref 35–45)
PCO2 ARTERIAL: 46.5 MM HG (ref 35–45)
PCO2 ARTERIAL: 46.6 MM HG (ref 35–45)
PCO2 ARTERIAL: 46.6 MMHG (ref 35–45)
PCO2 ARTERIAL: 46.8 MM HG (ref 35–45)
PCO2 ARTERIAL: 46.8 MMHG (ref 35–45)
PCO2 ARTERIAL: 47.3 MMHG (ref 35–45)
PCO2 ARTERIAL: 47.8 MMHG (ref 35–45)
PCO2 ARTERIAL: 48.1 MM HG (ref 35–45)
PCO2 ARTERIAL: 48.1 MM HG (ref 35–45)
PCO2 ARTERIAL: 48.2 MM HG (ref 35–45)
PCO2 ARTERIAL: 48.5 MMHG (ref 35–45)
PCO2 ARTERIAL: 49 MM HG (ref 35–45)
PCO2 ARTERIAL: 49.4 MMHG (ref 35–45)
PCO2 ARTERIAL: 49.8 MMHG (ref 35–45)
PCO2 ARTERIAL: 49.8 MMHG (ref 35–45)
PCO2 ARTERIAL: 50.7 MM HG (ref 35–45)
PCO2 ARTERIAL: 51.1 MMHG (ref 35–45)
PCO2 ARTERIAL: 51.4 MMHG (ref 35–45)
PCO2 ARTERIAL: 53.8 MMHG (ref 35–45)
PCO2 ARTERIAL: 54.5 MM HG (ref 35–45)
PCO2 ARTERIAL: 54.5 MMHG (ref 35–45)
PCO2 ARTERIAL: 57.3 MM HG (ref 35–45)
PCO2 ARTERIAL: 58.2 MMHG (ref 35–45)
PCO2 ARTERIAL: 62.4 MMHG (ref 35–45)
PCO2 ARTERIAL: 63.5 MM HG (ref 35–45)
PCO2 ARTERIAL: 65.4 MMHG (ref 35–45)
PCO2 ARTERIAL: 66.2 MM HG (ref 35–45)
PCO2 ARTERIAL: 73.5 MMHG (ref 35–45)
PCO2 ARTERIAL: 75 MM HG (ref 35–45)
PCO2 ARTERIAL: 76.1 MMHG (ref 35–45)
PCO2 ARTERIAL: 85.6 MM HG (ref 35–45)
PCO2 ARTERIAL: 86.5 MM HG (ref 35–45)
PCO2, VEN: 55.2 MM HG (ref 40–50)
PCO2, VEN: 62.8 MMHG (ref 40–50)
PDW BLD-RTO: 13.4 % (ref 12.4–15.4)
PDW BLD-RTO: 13.4 % (ref 12.4–15.4)
PDW BLD-RTO: 13.6 % (ref 12.4–15.4)
PDW BLD-RTO: 13.6 % (ref 12.4–15.4)
PDW BLD-RTO: 13.7 % (ref 12.4–15.4)
PDW BLD-RTO: 13.7 % (ref 12.4–15.4)
PDW BLD-RTO: 13.8 % (ref 12.4–15.4)
PDW BLD-RTO: 13.8 % (ref 12.4–15.4)
PDW BLD-RTO: 13.9 % (ref 12.4–15.4)
PDW BLD-RTO: 13.9 % (ref 12.4–15.4)
PDW BLD-RTO: 14 % (ref 12.4–15.4)
PDW BLD-RTO: 14 % (ref 12.4–15.4)
PDW BLD-RTO: 14.1 % (ref 12.4–15.4)
PDW BLD-RTO: 14.2 % (ref 12.4–15.4)
PDW BLD-RTO: 15.1 % (ref 12.4–15.4)
PDW BLD-RTO: 15.5 % (ref 12.4–15.4)
PDW BLD-RTO: 15.5 % (ref 12.4–15.4)
PDW BLD-RTO: 15.6 % (ref 12.4–15.4)
PDW BLD-RTO: 16.9 % (ref 12.4–15.4)
PERFORMED ON: ABNORMAL
PERFORMED ON: NORMAL
PH ARTERIAL: 7.01 (ref 7.35–7.45)
PH ARTERIAL: 7.03 (ref 7.35–7.45)
PH ARTERIAL: 7.13 (ref 7.35–7.45)
PH ARTERIAL: 7.16 (ref 7.35–7.45)
PH ARTERIAL: 7.17 (ref 7.35–7.45)
PH ARTERIAL: 7.19 (ref 7.35–7.45)
PH ARTERIAL: 7.23 (ref 7.35–7.45)
PH ARTERIAL: 7.26 (ref 7.35–7.45)
PH ARTERIAL: 7.28 (ref 7.35–7.45)
PH ARTERIAL: 7.28 (ref 7.35–7.45)
PH ARTERIAL: 7.3 (ref 7.35–7.45)
PH ARTERIAL: 7.31 (ref 7.35–7.45)
PH ARTERIAL: 7.32 (ref 7.35–7.45)
PH ARTERIAL: 7.33 (ref 7.35–7.45)
PH ARTERIAL: 7.34 (ref 7.35–7.45)
PH ARTERIAL: 7.35 (ref 7.35–7.45)
PH ARTERIAL: 7.36 (ref 7.35–7.45)
PH ARTERIAL: 7.37 (ref 7.35–7.45)
PH ARTERIAL: 7.38 (ref 7.35–7.45)
PH ARTERIAL: 7.39 (ref 7.35–7.45)
PH ARTERIAL: 7.4 (ref 7.35–7.45)
PH ARTERIAL: 7.41 (ref 7.35–7.45)
PH ARTERIAL: 7.42 (ref 7.35–7.45)
PH ARTERIAL: 7.42 (ref 7.35–7.45)
PH ARTERIAL: 7.43 (ref 7.35–7.45)
PH ARTERIAL: 7.44 (ref 7.35–7.45)
PH ARTERIAL: 7.46 (ref 7.35–7.45)
PH ARTERIAL: 7.46 (ref 7.35–7.45)
PH ARTERIAL: 7.47 (ref 7.35–7.45)
PH UA: 5
PH VENOUS: 7.15 (ref 7.35–7.45)
PH VENOUS: 7.16 (ref 7.35–7.45)
PH VENOUS: 7.18 (ref 7.35–7.45)
PH VENOUS: 7.28 (ref 7.35–7.45)
PH VENOUS: 7.29 (ref 7.35–7.45)
PH VENOUS: 7.3 (ref 7.35–7.45)
PH VENOUS: 7.3 (ref 7.35–7.45)
PH VENOUS: 7.31 (ref 7.35–7.45)
PH VENOUS: 7.32 (ref 7.35–7.45)
PH VENOUS: 7.33 (ref 7.35–7.45)
PH VENOUS: 7.34 (ref 7.35–7.45)
PH VENOUS: 7.35 (ref 7.35–7.45)
PH VENOUS: 7.35 (ref 7.35–7.45)
PH VENOUS: 7.36 (ref 7.35–7.45)
PH VENOUS: 7.36 (ref 7.35–7.45)
PH VENOUS: 7.37 (ref 7.35–7.45)
PH VENOUS: 7.4 (ref 7.35–7.45)
PH VENOUS: 7.43 (ref 7.35–7.45)
PH VENOUS: 7.51 (ref 7.35–7.45)
PHENCYCLIDINE SCREEN URINE: ABNORMAL
PHOSPHORUS: 16.8 MG/DL (ref 2.5–4.9)
PHOSPHORUS: 2.1 MG/DL (ref 2.5–4.9)
PHOSPHORUS: 2.2 MG/DL (ref 2.5–4.9)
PHOSPHORUS: 2.9 MG/DL (ref 2.5–4.9)
PHOSPHORUS: 3.4 MG/DL (ref 2.5–4.9)
PHOSPHORUS: 3.5 MG/DL (ref 2.5–4.9)
PHOSPHORUS: 3.6 MG/DL (ref 2.5–4.9)
PHOSPHORUS: 3.6 MG/DL (ref 2.5–4.9)
PHOSPHORUS: 3.7 MG/DL (ref 2.5–4.9)
PHOSPHORUS: 3.8 MG/DL (ref 2.5–4.9)
PHOSPHORUS: 4.2 MG/DL (ref 2.5–4.9)
PHOSPHORUS: 4.6 MG/DL (ref 2.5–4.9)
PHOSPHORUS: 4.7 MG/DL (ref 2.5–4.9)
PHOSPHORUS: 4.8 MG/DL (ref 2.5–4.9)
PHOSPHORUS: 4.9 MG/DL (ref 2.5–4.9)
PHOSPHORUS: 6 MG/DL (ref 2.5–4.9)
PHOSPHORUS: 6.1 MG/DL (ref 2.5–4.9)
PHOSPHORUS: 6.3 MG/DL (ref 2.5–4.9)
PHOSPHORUS: 6.4 MG/DL (ref 2.5–4.9)
PHOSPHORUS: 6.4 MG/DL (ref 2.5–4.9)
PHOSPHORUS: 6.6 MG/DL (ref 2.5–4.9)
PHOSPHORUS: 6.6 MG/DL (ref 2.5–4.9)
PHOSPHORUS: 6.7 MG/DL (ref 2.5–4.9)
PHOSPHORUS: 7.2 MG/DL (ref 2.5–4.9)
PHOSPHORUS: 7.2 MG/DL (ref 2.5–4.9)
PHOSPHORUS: 7.9 MG/DL (ref 2.5–4.9)
PHOSPHORUS: 8.4 MG/DL (ref 2.5–4.9)
PHOSPHORUS: 8.4 MG/DL (ref 2.5–4.9)
PHOSPHORUS: 8.6 MG/DL (ref 2.5–4.9)
PHOSPHORUS: 9 MG/DL (ref 2.5–4.9)
PLATELET # BLD: 103 K/UL (ref 135–450)
PLATELET # BLD: 103 K/UL (ref 135–450)
PLATELET # BLD: 105 K/UL (ref 135–450)
PLATELET # BLD: 107 K/UL (ref 135–450)
PLATELET # BLD: 112 K/UL (ref 135–450)
PLATELET # BLD: 113 K/UL (ref 135–450)
PLATELET # BLD: 127 K/UL (ref 135–450)
PLATELET # BLD: 130 K/UL (ref 135–450)
PLATELET # BLD: 135 K/UL (ref 135–450)
PLATELET # BLD: 143 K/UL (ref 135–450)
PLATELET # BLD: 155 K/UL (ref 135–450)
PLATELET # BLD: 167 K/UL (ref 135–450)
PLATELET # BLD: 184 K/UL (ref 135–450)
PLATELET # BLD: 185 K/UL (ref 135–450)
PLATELET # BLD: 244 K/UL (ref 135–450)
PLATELET # BLD: 266 K/UL (ref 135–450)
PLATELET # BLD: 292 K/UL (ref 135–450)
PLATELET # BLD: 322 K/UL (ref 135–450)
PLATELET # BLD: 336 K/UL (ref 135–450)
PLATELET SLIDE REVIEW: ABNORMAL
PLATELET SLIDE REVIEW: ADEQUATE
PLATELET SLIDE REVIEW: ADEQUATE
PMV BLD AUTO: 7.4 FL (ref 5–10.5)
PMV BLD AUTO: 7.6 FL (ref 5–10.5)
PMV BLD AUTO: 8 FL (ref 5–10.5)
PMV BLD AUTO: 8.3 FL (ref 5–10.5)
PMV BLD AUTO: 8.4 FL (ref 5–10.5)
PMV BLD AUTO: 8.6 FL (ref 5–10.5)
PMV BLD AUTO: 8.7 FL (ref 5–10.5)
PMV BLD AUTO: 8.9 FL (ref 5–10.5)
PMV BLD AUTO: 9.1 FL (ref 5–10.5)
PMV BLD AUTO: 9.1 FL (ref 5–10.5)
PMV BLD AUTO: 9.2 FL (ref 5–10.5)
PMV BLD AUTO: 9.3 FL (ref 5–10.5)
PMV BLD AUTO: 9.4 FL (ref 5–10.5)
PMV BLD AUTO: 9.5 FL (ref 5–10.5)
PMV BLD AUTO: 9.5 FL (ref 5–10.5)
PO2 ARTERIAL: 103.7 MM HG (ref 75–108)
PO2 ARTERIAL: 104 MMHG (ref 75–108)
PO2 ARTERIAL: 107 MMHG (ref 75–108)
PO2 ARTERIAL: 113.9 MM HG (ref 75–108)
PO2 ARTERIAL: 118 MMHG (ref 75–108)
PO2 ARTERIAL: 118.5 MM HG (ref 75–108)
PO2 ARTERIAL: 127.2 MM HG (ref 75–108)
PO2 ARTERIAL: 128 MMHG (ref 75–108)
PO2 ARTERIAL: 133.7 MM HG (ref 75–108)
PO2 ARTERIAL: 135 MMHG (ref 75–108)
PO2 ARTERIAL: 140 MMHG (ref 75–108)
PO2 ARTERIAL: 140 MMHG (ref 75–108)
PO2 ARTERIAL: 143 MMHG (ref 75–108)
PO2 ARTERIAL: 148 MMHG (ref 75–108)
PO2 ARTERIAL: 157 MMHG (ref 75–108)
PO2 ARTERIAL: 163 MMHG (ref 75–108)
PO2 ARTERIAL: 168.3 MM HG (ref 75–108)
PO2 ARTERIAL: 188.5 MM HG (ref 75–108)
PO2 ARTERIAL: 188.8 MM HG (ref 75–108)
PO2 ARTERIAL: 243.6 MM HG (ref 75–108)
PO2 ARTERIAL: 311.7 MM HG (ref 75–108)
PO2 ARTERIAL: 364.7 MM HG (ref 75–108)
PO2 ARTERIAL: 391 MM HG (ref 75–108)
PO2 ARTERIAL: 43.1 MM HG (ref 75–108)
PO2 ARTERIAL: 44.9 MMHG (ref 75–108)
PO2 ARTERIAL: 46.2 MMHG (ref 75–108)
PO2 ARTERIAL: 48.4 MM HG (ref 75–108)
PO2 ARTERIAL: 50 MMHG (ref 75–108)
PO2 ARTERIAL: 50.4 MM HG (ref 75–108)
PO2 ARTERIAL: 50.4 MMHG (ref 75–108)
PO2 ARTERIAL: 51.1 MMHG (ref 75–108)
PO2 ARTERIAL: 52.4 MM HG (ref 75–108)
PO2 ARTERIAL: 52.5 MM HG (ref 75–108)
PO2 ARTERIAL: 54.5 MM HG (ref 75–108)
PO2 ARTERIAL: 55.7 MM HG (ref 75–108)
PO2 ARTERIAL: 56.2 MMHG (ref 75–108)
PO2 ARTERIAL: 56.5 MM HG (ref 75–108)
PO2 ARTERIAL: 56.9 MM HG (ref 75–108)
PO2 ARTERIAL: 57.5 MMHG (ref 75–108)
PO2 ARTERIAL: 57.7 MM HG (ref 75–108)
PO2 ARTERIAL: 59.8 MM HG (ref 75–108)
PO2 ARTERIAL: 61.3 MM HG (ref 75–108)
PO2 ARTERIAL: 65.3 MMHG (ref 75–108)
PO2 ARTERIAL: 65.8 MMHG (ref 75–108)
PO2 ARTERIAL: 68.2 MMHG (ref 75–108)
PO2 ARTERIAL: 68.2 MMHG (ref 75–108)
PO2 ARTERIAL: 68.5 MMHG (ref 75–108)
PO2 ARTERIAL: 69.1 MM HG (ref 75–108)
PO2 ARTERIAL: 69.1 MMHG (ref 75–108)
PO2 ARTERIAL: 69.4 MMHG (ref 75–108)
PO2 ARTERIAL: 70 MM HG (ref 75–108)
PO2 ARTERIAL: 71.2 MMHG (ref 75–108)
PO2 ARTERIAL: 71.3 MMHG (ref 75–108)
PO2 ARTERIAL: 72.1 MM HG (ref 75–108)
PO2 ARTERIAL: 73.6 MMHG (ref 75–108)
PO2 ARTERIAL: 74 MM HG (ref 75–108)
PO2 ARTERIAL: 75.4 MMHG (ref 75–108)
PO2 ARTERIAL: 76.9 MMHG (ref 75–108)
PO2 ARTERIAL: 78.4 MM HG (ref 75–108)
PO2 ARTERIAL: 79.1 MM HG (ref 75–108)
PO2 ARTERIAL: 79.3 MMHG (ref 75–108)
PO2 ARTERIAL: 81.9 MM HG (ref 75–108)
PO2 ARTERIAL: 82.3 MM HG (ref 75–108)
PO2 ARTERIAL: 85.6 MM HG (ref 75–108)
PO2 ARTERIAL: 87.4 MMHG (ref 75–108)
PO2 ARTERIAL: 87.8 MM HG (ref 75–108)
PO2 ARTERIAL: 87.9 MM HG (ref 75–108)
PO2 ARTERIAL: 88.7 MMHG (ref 75–108)
PO2 ARTERIAL: 90.9 MM HG (ref 75–108)
PO2 ARTERIAL: 94.5 MMHG (ref 75–108)
PO2 ARTERIAL: 94.6 MMHG (ref 75–108)
PO2 ARTERIAL: 94.7 MM HG (ref 75–108)
PO2 ARTERIAL: 94.9 MMHG (ref 75–108)
PO2, VEN: 16 MMHG
PO2, VEN: 32 MM HG
POC FIO2: 80
POC HEMATOCRIT: 11 % (ref 40.5–52.5)
POC HEMATOCRIT: 18 % (ref 40.5–52.5)
POC HEMATOCRIT: 20 % (ref 40.5–52.5)
POC HEMATOCRIT: 20 % (ref 40.5–52.5)
POC HEMATOCRIT: 22 % (ref 40.5–52.5)
POC HEMATOCRIT: 22 % (ref 40.5–52.5)
POC HEMATOCRIT: 37 % (ref 40.5–52.5)
POC HEMATOCRIT: 40 % (ref 40.5–52.5)
POC HEMATOCRIT: 41 % (ref 40.5–52.5)
POC HEMATOCRIT: 41 % (ref 40.5–52.5)
POC HEMATOCRIT: 42 % (ref 40.5–52.5)
POC HEMATOCRIT: 42 % (ref 40.5–52.5)
POC HEMATOCRIT: 43 % (ref 40.5–52.5)
POC HEMATOCRIT: 43 % (ref 40.5–52.5)
POC HEMATOCRIT: 45 % (ref 40.5–52.5)
POC HEMATOCRIT: 46 % (ref 40.5–52.5)
POC HEMATOCRIT: 48 % (ref 40.5–52.5)
POC HEMATOCRIT: 50 % (ref 40.5–52.5)
POC PATIENT TEMP: 30
POC PATIENT TEMP: 31
POC PATIENT TEMP: 33
POC PATIENT TEMP: 34
POC POTASSIUM: 2.7 MMOL/L (ref 3.5–5.1)
POC POTASSIUM: 2.7 MMOL/L (ref 3.5–5.1)
POC POTASSIUM: 2.9 MMOL/L (ref 3.5–5.1)
POC POTASSIUM: 3 MMOL/L (ref 3.5–5.1)
POC POTASSIUM: 3 MMOL/L (ref 3.5–5.1)
POC POTASSIUM: 3.1 MMOL/L (ref 3.5–5.1)
POC POTASSIUM: 3.4 MMOL/L (ref 3.5–5.1)
POC POTASSIUM: 3.5 MMOL/L (ref 3.5–5.1)
POC POTASSIUM: 3.7 MMOL/L (ref 3.5–5.1)
POC POTASSIUM: 3.8 MMOL/L (ref 3.5–5.1)
POC POTASSIUM: 4 MMOL/L (ref 3.5–5.1)
POC POTASSIUM: 5.2 MMOL/L (ref 3.5–5.1)
POC POTASSIUM: 5.4 MMOL/L (ref 3.5–5.1)
POC POTASSIUM: 5.6 MMOL/L (ref 3.5–5.1)
POC POTASSIUM: 5.7 MMOL/L (ref 3.5–5.1)
POC POTASSIUM: 5.8 MMOL/L (ref 3.5–5.1)
POC POTASSIUM: 5.8 MMOL/L (ref 3.5–5.1)
POC POTASSIUM: 5.9 MMOL/L (ref 3.5–5.1)
POC POTASSIUM: 6 MMOL/L (ref 3.5–5.1)
POC POTASSIUM: 6.7 MMOL/L (ref 3.5–5.1)
POC POTASSIUM: 6.8 MMOL/L (ref 3.5–5.1)
POC POTASSIUM: 6.8 MMOL/L (ref 3.5–5.1)
POC POTASSIUM: 7.4 MMOL/L (ref 3.5–5.1)
POC SAMPLE TYPE: ABNORMAL
POC SAMPLE TYPE: NORMAL
POC SODIUM: 127 MMOL/L (ref 136–145)
POC SODIUM: 128 MMOL/L (ref 136–145)
POC SODIUM: 129 MMOL/L (ref 136–145)
POC SODIUM: 129 MMOL/L (ref 136–145)
POC SODIUM: 132 MMOL/L (ref 136–145)
POC SODIUM: 134 MMOL/L (ref 136–145)
POC SODIUM: 135 MMOL/L (ref 136–145)
POC SODIUM: 136 MMOL/L (ref 136–145)
POC SODIUM: 138 MMOL/L (ref 136–145)
POC SODIUM: 138 MMOL/L (ref 136–145)
POC SODIUM: 139 MMOL/L (ref 136–145)
POC SODIUM: 140 MMOL/L (ref 136–145)
POC SODIUM: 141 MMOL/L (ref 136–145)
POC SODIUM: 142 MMOL/L (ref 136–145)
POC SODIUM: 144 MMOL/L (ref 136–145)
POIKILOCYTES: ABNORMAL
POLYCHROMASIA: ABNORMAL
POTASSIUM SERPL-SCNC: 3.1 MMOL/L (ref 3.5–5.1)
POTASSIUM SERPL-SCNC: 3.3 MMOL/L (ref 3.5–5.1)
POTASSIUM SERPL-SCNC: 3.5 MMOL/L (ref 3.5–5.1)
POTASSIUM SERPL-SCNC: 3.5 MMOL/L (ref 3.5–5.1)
POTASSIUM SERPL-SCNC: 3.6 MMOL/L (ref 3.5–5.1)
POTASSIUM SERPL-SCNC: 3.7 MMOL/L (ref 3.5–5.1)
POTASSIUM SERPL-SCNC: 3.8 MMOL/L (ref 3.5–5.1)
POTASSIUM SERPL-SCNC: 3.9 MMOL/L (ref 3.5–5.1)
POTASSIUM SERPL-SCNC: 3.9 MMOL/L (ref 3.5–5.1)
POTASSIUM SERPL-SCNC: 4 MMOL/L (ref 3.5–5.1)
POTASSIUM SERPL-SCNC: 4 MMOL/L (ref 3.5–5.1)
POTASSIUM SERPL-SCNC: 4.1 MMOL/L (ref 3.5–5.1)
POTASSIUM SERPL-SCNC: 4.2 MMOL/L (ref 3.5–5.1)
POTASSIUM SERPL-SCNC: 4.2 MMOL/L (ref 3.5–5.1)
POTASSIUM SERPL-SCNC: 4.3 MMOL/L (ref 3.5–5.1)
POTASSIUM SERPL-SCNC: 4.3 MMOL/L (ref 3.5–5.1)
POTASSIUM SERPL-SCNC: 4.5 MMOL/L (ref 3.5–5.1)
POTASSIUM SERPL-SCNC: 4.6 MMOL/L (ref 3.5–5.1)
POTASSIUM SERPL-SCNC: 4.7 MMOL/L (ref 3.5–5.1)
POTASSIUM SERPL-SCNC: 4.8 MMOL/L (ref 3.5–5.1)
POTASSIUM SERPL-SCNC: 4.8 MMOL/L (ref 3.5–5.1)
POTASSIUM SERPL-SCNC: 4.9 MMOL/L (ref 3.5–5.1)
POTASSIUM SERPL-SCNC: 4.9 MMOL/L (ref 3.5–5.1)
POTASSIUM SERPL-SCNC: 5.3 MMOL/L (ref 3.5–5.1)
POTASSIUM SERPL-SCNC: 5.3 MMOL/L (ref 3.5–5.1)
POTASSIUM SERPL-SCNC: 5.5 MMOL/L (ref 3.5–5.1)
POTASSIUM SERPL-SCNC: 5.7 MMOL/L (ref 3.5–5.1)
POTASSIUM SERPL-SCNC: 5.7 MMOL/L (ref 3.5–5.1)
POTASSIUM SERPL-SCNC: 5.8 MMOL/L (ref 3.5–5.1)
POTASSIUM SERPL-SCNC: 5.8 MMOL/L (ref 3.5–5.1)
POTASSIUM SERPL-SCNC: 6 MMOL/L (ref 3.5–5.1)
POTASSIUM SERPL-SCNC: 6 MMOL/L (ref 3.5–5.1)
POTASSIUM SERPL-SCNC: 6.1 MMOL/L (ref 3.5–5.1)
POTASSIUM SERPL-SCNC: 6.1 MMOL/L (ref 3.5–5.1)
POTASSIUM SERPL-SCNC: 6.4 MMOL/L (ref 3.5–5.1)
POTASSIUM SERPL-SCNC: 7 MMOL/L (ref 3.5–5.1)
POTASSIUM SERPL-SCNC: 7.1 MMOL/L (ref 3.5–5.1)
POTASSIUM SERPL-SCNC: 7.2 MMOL/L (ref 3.5–5.1)
POTASSIUM SERPL-SCNC: 7.6 MMOL/L (ref 3.5–5.1)
POTASSIUM SERPL-SCNC: 7.7 MMOL/L (ref 3.5–5.1)
POTASSIUM SERPL-SCNC: 7.7 MMOL/L (ref 3.5–5.1)
PRELIMINARY: NORMAL
PRELIMINARY: NORMAL
PRO-BNP: 1247 PG/ML (ref 0–124)
PROCALCITONIN: 0.45 NG/ML (ref 0–0.15)
PROCALCITONIN: 0.45 NG/ML (ref 0–0.15)
PROCALCITONIN: 13.99 NG/ML (ref 0–0.15)
PROCALCITONIN: 14.35 NG/ML (ref 0–0.15)
PROCALCITONIN: 6.21 NG/ML (ref 0–0.15)
PROCALCITONIN: 9.98 NG/ML (ref 0–0.15)
PROMYELOCYTES PERCENT: 2 %
PROMYELOCYTES PERCENT: 4 %
PROPOXYPHENE SCREEN: ABNORMAL
PROTHROMBIN TIME: 11.6 SEC (ref 10–13.2)
PROTHROMBIN TIME: 12 SEC (ref 10–13.2)
PROTHROMBIN TIME: 12.2 SEC (ref 10–13.2)
PROTHROMBIN TIME: 12.5 SEC (ref 10–13.2)
PROTHROMBIN TIME: 13 SEC (ref 10–13.2)
PROTHROMBIN TIME: 13.1 SEC (ref 10–13.2)
PROTHROMBIN TIME: 14.3 SEC (ref 10–13.2)
PROTHROMBIN TIME: 14.7 SEC (ref 10–13.2)
PROTHROMBIN TIME: 15.7 SEC (ref 10–13.2)
PROTHROMBIN TIME: 16.4 SEC (ref 10–13.2)
PROTHROMBIN TIME: 19.4 SEC (ref 10–13.2)
PROTHROMBIN TIME: 19.6 SEC (ref 10–13.2)
PROTHROMBIN TIME: 21.1 SEC (ref 10–13.2)
PROTHROMBIN TIME: 23 SEC (ref 10–13.2)
PROTHROMBIN TIME: 23.7 SEC (ref 10–13.2)
PROTHROMBIN TIME: 27.3 SEC (ref 10–13.2)
PROTHROMBIN TIME: 37.2 SEC (ref 10–13.2)
PROTHROMBIN TIME: 8.5 SEC (ref 10–13.2)
RBC # BLD: 1.84 M/UL (ref 4.2–5.9)
RBC # BLD: 2.44 M/UL (ref 4.2–5.9)
RBC # BLD: 2.59 M/UL (ref 4.2–5.9)
RBC # BLD: 2.99 M/UL (ref 4.2–5.9)
RBC # BLD: 3.04 M/UL (ref 4.2–5.9)
RBC # BLD: 3.32 M/UL (ref 4.2–5.9)
RBC # BLD: 3.58 M/UL (ref 4.2–5.9)
RBC # BLD: 3.64 M/UL (ref 4.2–5.9)
RBC # BLD: 3.65 M/UL (ref 4.2–5.9)
RBC # BLD: 3.74 M/UL (ref 4.2–5.9)
RBC # BLD: 3.78 M/UL (ref 4.2–5.9)
RBC # BLD: 3.81 M/UL (ref 4.2–5.9)
RBC # BLD: 3.94 M/UL (ref 4.2–5.9)
RBC # BLD: 4.27 M/UL (ref 4.2–5.9)
RBC # BLD: 4.38 M/UL (ref 4.2–5.9)
RBC # BLD: 4.41 M/UL (ref 4.2–5.9)
RBC # BLD: 4.46 M/UL (ref 4.2–5.9)
RBC # BLD: 4.89 M/UL (ref 4.2–5.9)
RBC # BLD: 5.66 M/UL (ref 4.2–5.9)
RBC, BAL: 1064 /CUMM
REASON FOR REJECTION: NORMAL
REJECTED TEST: NORMAL
RETICULOCYTE ABSOLUTE COUNT: 0.01 M/UL
RETICULOCYTE COUNT PCT: 0.43 % (ref 0.5–2.18)
RSV RAPID ANTIGEN: NEGATIVE
SLIDE REVIEW: ABNORMAL
SMUDGE CELLS: PRESENT
SMUDGE CELLS: PRESENT
SODIUM BLD-SCNC: 126 MMOL/L (ref 136–145)
SODIUM BLD-SCNC: 126 MMOL/L (ref 136–145)
SODIUM BLD-SCNC: 127 MMOL/L (ref 136–145)
SODIUM BLD-SCNC: 127 MMOL/L (ref 136–145)
SODIUM BLD-SCNC: 128 MMOL/L (ref 136–145)
SODIUM BLD-SCNC: 129 MMOL/L (ref 136–145)
SODIUM BLD-SCNC: 129 MMOL/L (ref 136–145)
SODIUM BLD-SCNC: 130 MMOL/L (ref 136–145)
SODIUM BLD-SCNC: 131 MMOL/L (ref 136–145)
SODIUM BLD-SCNC: 132 MMOL/L (ref 136–145)
SODIUM BLD-SCNC: 132 MMOL/L (ref 136–145)
SODIUM BLD-SCNC: 133 MMOL/L (ref 136–145)
SODIUM BLD-SCNC: 134 MMOL/L (ref 136–145)
SODIUM BLD-SCNC: 135 MMOL/L (ref 136–145)
SODIUM BLD-SCNC: 135 MMOL/L (ref 136–145)
SODIUM BLD-SCNC: 136 MMOL/L (ref 136–145)
SODIUM BLD-SCNC: 137 MMOL/L (ref 136–145)
SODIUM BLD-SCNC: 138 MMOL/L (ref 136–145)
SODIUM BLD-SCNC: 138 MMOL/L (ref 136–145)
SODIUM BLD-SCNC: 139 MMOL/L (ref 136–145)
SODIUM BLD-SCNC: 140 MMOL/L (ref 136–145)
SODIUM BLD-SCNC: 140 MMOL/L (ref 136–145)
SODIUM BLD-SCNC: 141 MMOL/L (ref 136–145)
SODIUM BLD-SCNC: 141 MMOL/L (ref 136–145)
SODIUM BLD-SCNC: 142 MMOL/L (ref 136–145)
SODIUM BLD-SCNC: 143 MMOL/L (ref 136–145)
SODIUM BLD-SCNC: 143 MMOL/L (ref 136–145)
SODIUM BLD-SCNC: 144 MMOL/L (ref 136–145)
SODIUM BLD-SCNC: 144 MMOL/L (ref 136–145)
SPHEROCYTES: ABNORMAL
TCO2 ARTERIAL: 16.9 MMOL/L
TCO2 ARTERIAL: 18 MMOL/L
TCO2 ARTERIAL: 18.6 MMOL/L
TCO2 ARTERIAL: 18.6 MMOL/L
TCO2 ARTERIAL: 19 MMOL/L
TCO2 ARTERIAL: 21 MMOL/L
TCO2 ARTERIAL: 22 MMOL/L
TCO2 ARTERIAL: 22.6 MMOL/L
TCO2 ARTERIAL: 23 MMOL/L
TCO2 ARTERIAL: 23.8 MMOL/L
TCO2 ARTERIAL: 24 MMOL/L
TCO2 ARTERIAL: 24.1 MMOL/L
TCO2 ARTERIAL: 24.7 MMOL/L
TCO2 ARTERIAL: 25 MMOL/L
TCO2 ARTERIAL: 25.3 MMOL/L
TCO2 ARTERIAL: 25.8 MMOL/L
TCO2 ARTERIAL: 25.8 MMOL/L
TCO2 ARTERIAL: 26 MMOL/L
TCO2 ARTERIAL: 26.1 MMOL/L
TCO2 ARTERIAL: 26.7 MMOL/L
TCO2 ARTERIAL: 27.1 MMOL/L
TCO2 ARTERIAL: 27.1 MMOL/L
TCO2 ARTERIAL: 27.6 MMOL/L
TCO2 ARTERIAL: 27.8 MMOL/L
TCO2 ARTERIAL: 28 MMOL/L
TCO2 ARTERIAL: 28.2 MMOL/L
TCO2 ARTERIAL: 28.2 MMOL/L
TCO2 ARTERIAL: 28.4 MMOL/L
TCO2 ARTERIAL: 28.7 MMOL/L
TCO2 ARTERIAL: 28.8 MMOL/L
TCO2 ARTERIAL: 29 MMOL/L
TCO2 ARTERIAL: 29.4 MMOL/L
TCO2 ARTERIAL: 29.4 MMOL/L
TCO2 ARTERIAL: 29.7 MMOL/L
TCO2 ARTERIAL: 29.7 MMOL/L
TCO2 ARTERIAL: 30.3 MMOL/L
TCO2 ARTERIAL: 30.4 MMOL/L
TCO2 ARTERIAL: 30.5 MMOL/L
TCO2 ARTERIAL: 30.8 MMOL/L
TCO2 ARTERIAL: 30.8 MMOL/L
TCO2 ARTERIAL: 31 MMOL/L
TCO2 ARTERIAL: 32 MMOL/L
TCO2 ARTERIAL: 32.1 MMOL/L
TCO2 ARTERIAL: 33 MMOL/L
TCO2 ARTERIAL: 36 MMOL/L
TCO2 CALC VENOUS: 29 MMOL/L
TCO2 CALC VENOUS: 35 MMOL/L
TOTAL CK: 1597 U/L (ref 39–308)
TOTAL PROTEIN: 5.1 G/DL (ref 6.4–8.2)
TOTAL PROTEIN: 5.8 G/DL (ref 6.4–8.2)
TOTAL PROTEIN: 5.9 G/DL (ref 6.4–8.2)
TOTAL PROTEIN: 6 G/DL (ref 6.4–8.2)
TOTAL PROTEIN: 6.1 G/DL (ref 6.4–8.2)
TOTAL PROTEIN: 6.4 G/DL (ref 6.4–8.2)
TOTAL PROTEIN: 6.4 G/DL (ref 6.4–8.2)
TOTAL PROTEIN: 6.5 G/DL (ref 6.4–8.2)
TOTAL PROTEIN: 7.1 G/DL (ref 6.4–8.2)
TOXIC GRANULATION: PRESENT
TRIGL SERPL-MCNC: 190 MG/DL (ref 0–150)
TRIGL SERPL-MCNC: 363 MG/DL (ref 0–150)
TRIGL SERPL-MCNC: 477 MG/DL (ref 0–150)
TROPONIN: 0.19 NG/ML
TROPONIN: 3.04 NG/ML
URIC ACID, SERUM: 10.6 MG/DL (ref 3.5–7.2)
URIC ACID, SERUM: 2.7 MG/DL (ref 3.5–7.2)
VACUOLATED NEUTROPHILS: PRESENT
VANCOMYCIN RANDOM: 10.4 UG/ML
VANCOMYCIN RANDOM: 12.4 UG/ML
VANCOMYCIN RANDOM: 14.1 UG/ML
VANCOMYCIN RANDOM: 15.5 UG/ML
VANCOMYCIN RANDOM: 26.9 UG/ML
VANCOMYCIN RANDOM: 6.7 UG/ML
VOLUME LAVAGE: 25 ML
WBC # BLD: 10.9 K/UL (ref 4–11)
WBC # BLD: 11 K/UL (ref 4–11)
WBC # BLD: 12.3 K/UL (ref 4–11)
WBC # BLD: 12.7 K/UL (ref 4–11)
WBC # BLD: 13.4 K/UL (ref 4–11)
WBC # BLD: 14.1 K/UL (ref 4–11)
WBC # BLD: 14.4 K/UL (ref 4–11)
WBC # BLD: 14.8 K/UL (ref 4–11)
WBC # BLD: 18.2 K/UL (ref 4–11)
WBC # BLD: 19.1 K/UL (ref 4–11)
WBC # BLD: 19.4 K/UL (ref 4–11)
WBC # BLD: 20.1 K/UL (ref 4–11)
WBC # BLD: 22.1 K/UL (ref 4–11)
WBC # BLD: 54.1 K/UL (ref 4–11)
WBC # BLD: 59.6 K/UL (ref 4–11)
WBC # BLD: 7.5 K/UL (ref 4–11)
WBC # BLD: 75.2 K/UL (ref 4–11)
WBC # BLD: 85.5 K/UL (ref 4–11)
WBC # BLD: 9.1 K/UL (ref 4–11)
WBC/EPI CELLS BAL: NORMAL /CUMM

## 2020-01-01 PROCEDURE — 0BH18EZ INSERTION OF ENDOTRACHEAL AIRWAY INTO TRACHEA, VIA NATURAL OR ARTIFICIAL OPENING ENDOSCOPIC: ICD-10-PCS | Performed by: INTERNAL MEDICINE

## 2020-01-01 PROCEDURE — 6360000002 HC RX W HCPCS: Performed by: INTERNAL MEDICINE

## 2020-01-01 PROCEDURE — 84100 ASSAY OF PHOSPHORUS: CPT

## 2020-01-01 PROCEDURE — 84132 ASSAY OF SERUM POTASSIUM: CPT

## 2020-01-01 PROCEDURE — 6360000002 HC RX W HCPCS: Performed by: NURSE PRACTITIONER

## 2020-01-01 PROCEDURE — 37799 UNLISTED PX VASCULAR SURGERY: CPT

## 2020-01-01 PROCEDURE — 94003 VENT MGMT INPAT SUBQ DAY: CPT

## 2020-01-01 PROCEDURE — 6360000002 HC RX W HCPCS: Performed by: SURGERY

## 2020-01-01 PROCEDURE — 2500000003 HC RX 250 WO HCPCS: Performed by: NURSE ANESTHETIST, CERTIFIED REGISTERED

## 2020-01-01 PROCEDURE — 2580000003 HC RX 258: Performed by: NURSE PRACTITIONER

## 2020-01-01 PROCEDURE — 6360000002 HC RX W HCPCS: Performed by: THORACIC SURGERY (CARDIOTHORACIC VASCULAR SURGERY)

## 2020-01-01 PROCEDURE — 94750 HC PULMONARY COMPLIANCE STUDY: CPT

## 2020-01-01 PROCEDURE — 82803 BLOOD GASES ANY COMBINATION: CPT

## 2020-01-01 PROCEDURE — 99291 CRITICAL CARE FIRST HOUR: CPT | Performed by: INTERNAL MEDICINE

## 2020-01-01 PROCEDURE — 99222 1ST HOSP IP/OBS MODERATE 55: CPT | Performed by: THORACIC SURGERY (CARDIOTHORACIC VASCULAR SURGERY)

## 2020-01-01 PROCEDURE — 87205 SMEAR GRAM STAIN: CPT

## 2020-01-01 PROCEDURE — P9012 CRYOPRECIPITATE EACH UNIT: HCPCS

## 2020-01-01 PROCEDURE — 6360000002 HC RX W HCPCS: Performed by: PEDIATRICS

## 2020-01-01 PROCEDURE — 31624 DX BRONCHOSCOPE/LAVAGE: CPT | Performed by: INTERNAL MEDICINE

## 2020-01-01 PROCEDURE — 80069 RENAL FUNCTION PANEL: CPT

## 2020-01-01 PROCEDURE — 85384 FIBRINOGEN ACTIVITY: CPT

## 2020-01-01 PROCEDURE — 93005 ELECTROCARDIOGRAM TRACING: CPT | Performed by: THORACIC SURGERY (CARDIOTHORACIC VASCULAR SURGERY)

## 2020-01-01 PROCEDURE — 83735 ASSAY OF MAGNESIUM: CPT

## 2020-01-01 PROCEDURE — C1751 CATH, INF, PER/CENT/MIDLINE: HCPCS | Performed by: THORACIC SURGERY (CARDIOTHORACIC VASCULAR SURGERY)

## 2020-01-01 PROCEDURE — 2580000003 HC RX 258: Performed by: THORACIC SURGERY (CARDIOTHORACIC VASCULAR SURGERY)

## 2020-01-01 PROCEDURE — 2580000003 HC RX 258: Performed by: INTERNAL MEDICINE

## 2020-01-01 PROCEDURE — 84145 PROCALCITONIN (PCT): CPT

## 2020-01-01 PROCEDURE — 84484 ASSAY OF TROPONIN QUANT: CPT

## 2020-01-01 PROCEDURE — 37252 INTRVASC US NONCORONARY 1ST: CPT | Performed by: SURGERY

## 2020-01-01 PROCEDURE — 2580000003 HC RX 258: Performed by: NURSE ANESTHETIST, CERTIFIED REGISTERED

## 2020-01-01 PROCEDURE — 08N1XZZ RELEASE LEFT EYE, EXTERNAL APPROACH: ICD-10-PCS | Performed by: THORACIC SURGERY (CARDIOTHORACIC VASCULAR SURGERY)

## 2020-01-01 PROCEDURE — 85027 COMPLETE CBC AUTOMATED: CPT

## 2020-01-01 PROCEDURE — 85025 COMPLETE CBC W/AUTO DIFF WBC: CPT

## 2020-01-01 PROCEDURE — 80202 ASSAY OF VANCOMYCIN: CPT

## 2020-01-01 PROCEDURE — 80048 BASIC METABOLIC PNL TOTAL CA: CPT

## 2020-01-01 PROCEDURE — 2700000000 HC OXYGEN THERAPY PER DAY

## 2020-01-01 PROCEDURE — 94640 AIRWAY INHALATION TREATMENT: CPT

## 2020-01-01 PROCEDURE — 6370000000 HC RX 637 (ALT 250 FOR IP): Performed by: THORACIC SURGERY (CARDIOTHORACIC VASCULAR SURGERY)

## 2020-01-01 PROCEDURE — 93005 ELECTROCARDIOGRAM TRACING: CPT | Performed by: EMERGENCY MEDICINE

## 2020-01-01 PROCEDURE — 99024 POSTOP FOLLOW-UP VISIT: CPT | Performed by: THORACIC SURGERY (CARDIOTHORACIC VASCULAR SURGERY)

## 2020-01-01 PROCEDURE — 94761 N-INVAS EAR/PLS OXIMETRY MLT: CPT

## 2020-01-01 PROCEDURE — 74174 CTA ABD&PLVS W/CONTRAST: CPT

## 2020-01-01 PROCEDURE — C9113 INJ PANTOPRAZOLE SODIUM, VIA: HCPCS | Performed by: THORACIC SURGERY (CARDIOTHORACIC VASCULAR SURGERY)

## 2020-01-01 PROCEDURE — 36556 INSERT NON-TUNNEL CV CATH: CPT | Performed by: INTERNAL MEDICINE

## 2020-01-01 PROCEDURE — APPNB15 APP NON BILLABLE TIME 0-15 MINS: Performed by: NURSE PRACTITIONER

## 2020-01-01 PROCEDURE — C8929 TTE W OR WO FOL WCON,DOPPLER: HCPCS

## 2020-01-01 PROCEDURE — 71045 X-RAY EXAM CHEST 1 VIEW: CPT

## 2020-01-01 PROCEDURE — 76937 US GUIDE VASCULAR ACCESS: CPT | Performed by: INTERNAL MEDICINE

## 2020-01-01 PROCEDURE — 82550 ASSAY OF CK (CPK): CPT

## 2020-01-01 PROCEDURE — 7100000001 HC PACU RECOVERY - ADDTL 15 MIN

## 2020-01-01 PROCEDURE — 36415 COLL VENOUS BLD VENIPUNCTURE: CPT

## 2020-01-01 PROCEDURE — 2100000000 HC CCU R&B

## 2020-01-01 PROCEDURE — 99285 EMERGENCY DEPT VISIT HI MDM: CPT

## 2020-01-01 PROCEDURE — 88185 FLOWCYTOMETRY/TC ADD-ON: CPT

## 2020-01-01 PROCEDURE — 99254 IP/OBS CNSLTJ NEW/EST MOD 60: CPT | Performed by: SURGERY

## 2020-01-01 PROCEDURE — 85379 FIBRIN DEGRADATION QUANT: CPT

## 2020-01-01 PROCEDURE — 99231 SBSQ HOSP IP/OBS SF/LOW 25: CPT | Performed by: SURGERY

## 2020-01-01 PROCEDURE — 2580000003 HC RX 258: Performed by: SURGERY

## 2020-01-01 PROCEDURE — 3600000008 HC SURGERY OHS BASE: Performed by: THORACIC SURGERY (CARDIOTHORACIC VASCULAR SURGERY)

## 2020-01-01 PROCEDURE — 84295 ASSAY OF SERUM SODIUM: CPT

## 2020-01-01 PROCEDURE — 82947 ASSAY GLUCOSE BLOOD QUANT: CPT

## 2020-01-01 PROCEDURE — 04CL3ZZ EXTIRPATION OF MATTER FROM LEFT FEMORAL ARTERY, PERCUTANEOUS APPROACH: ICD-10-PCS | Performed by: SURGERY

## 2020-01-01 PROCEDURE — 6370000000 HC RX 637 (ALT 250 FOR IP): Performed by: INTERNAL MEDICINE

## 2020-01-01 PROCEDURE — 6360000002 HC RX W HCPCS

## 2020-01-01 PROCEDURE — 85730 THROMBOPLASTIN TIME PARTIAL: CPT

## 2020-01-01 PROCEDURE — 05HM33Z INSERTION OF INFUSION DEVICE INTO RIGHT INTERNAL JUGULAR VEIN, PERCUTANEOUS APPROACH: ICD-10-PCS | Performed by: INTERNAL MEDICINE

## 2020-01-01 PROCEDURE — 99233 SBSQ HOSP IP/OBS HIGH 50: CPT | Performed by: INTERNAL MEDICINE

## 2020-01-01 PROCEDURE — 86850 RBC ANTIBODY SCREEN: CPT

## 2020-01-01 PROCEDURE — 82330 ASSAY OF CALCIUM: CPT

## 2020-01-01 PROCEDURE — 70450 CT HEAD/BRAIN W/O DYE: CPT

## 2020-01-01 PROCEDURE — 96376 TX/PRO/DX INJ SAME DRUG ADON: CPT

## 2020-01-01 PROCEDURE — 88305 TISSUE EXAM BY PATHOLOGIST: CPT

## 2020-01-01 PROCEDURE — P9017 PLASMA 1 DONOR FRZ W/IN 8 HR: HCPCS

## 2020-01-01 PROCEDURE — 87253 VIRUS INOCULATE TISSUE ADDL: CPT

## 2020-01-01 PROCEDURE — 02VX0DZ RESTRICTION OF THORACIC AORTA, ASCENDING/ARCH WITH INTRALUMINAL DEVICE, OPEN APPROACH: ICD-10-PCS | Performed by: THORACIC SURGERY (CARDIOTHORACIC VASCULAR SURGERY)

## 2020-01-01 PROCEDURE — 5A1955Z RESPIRATORY VENTILATION, GREATER THAN 96 CONSECUTIVE HOURS: ICD-10-PCS | Performed by: INTERNAL MEDICINE

## 2020-01-01 PROCEDURE — 2500000003 HC RX 250 WO HCPCS: Performed by: FAMILY MEDICINE

## 2020-01-01 PROCEDURE — 5A1D70Z PERFORMANCE OF URINARY FILTRATION, INTERMITTENT, LESS THAN 6 HOURS PER DAY: ICD-10-PCS | Performed by: INTERNAL MEDICINE

## 2020-01-01 PROCEDURE — 83605 ASSAY OF LACTIC ACID: CPT

## 2020-01-01 PROCEDURE — 90945 DIALYSIS ONE EVALUATION: CPT

## 2020-01-01 PROCEDURE — 4A133B3 MONITORING OF ARTERIAL PRESSURE, PULMONARY, PERCUTANEOUS APPROACH: ICD-10-PCS | Performed by: ANESTHESIOLOGY

## 2020-01-01 PROCEDURE — 2580000003 HC RX 258

## 2020-01-01 PROCEDURE — 2500000003 HC RX 250 WO HCPCS: Performed by: THORACIC SURGERY (CARDIOTHORACIC VASCULAR SURGERY)

## 2020-01-01 PROCEDURE — 6360000004 HC RX CONTRAST MEDICATION: Performed by: THORACIC SURGERY (CARDIOTHORACIC VASCULAR SURGERY)

## 2020-01-01 PROCEDURE — 90935 HEMODIALYSIS ONE EVALUATION: CPT

## 2020-01-01 PROCEDURE — C1769 GUIDE WIRE: HCPCS

## 2020-01-01 PROCEDURE — 87040 BLOOD CULTURE FOR BACTERIA: CPT

## 2020-01-01 PROCEDURE — 2720000010 HC SURG SUPPLY STERILE: Performed by: THORACIC SURGERY (CARDIOTHORACIC VASCULAR SURGERY)

## 2020-01-01 PROCEDURE — 6370000000 HC RX 637 (ALT 250 FOR IP): Performed by: NURSE PRACTITIONER

## 2020-01-01 PROCEDURE — 36430 TRANSFUSION BLD/BLD COMPNT: CPT

## 2020-01-01 PROCEDURE — 2500000003 HC RX 250 WO HCPCS: Performed by: PEDIATRICS

## 2020-01-01 PROCEDURE — 70496 CT ANGIOGRAPHY HEAD: CPT

## 2020-01-01 PROCEDURE — 7100000000 HC PACU RECOVERY - FIRST 15 MIN

## 2020-01-01 PROCEDURE — 2500000003 HC RX 250 WO HCPCS: Performed by: NURSE PRACTITIONER

## 2020-01-01 PROCEDURE — 83010 ASSAY OF HAPTOGLOBIN QUANT: CPT

## 2020-01-01 PROCEDURE — 85018 HEMOGLOBIN: CPT

## 2020-01-01 PROCEDURE — 04UL3KZ SUPPLEMENT LEFT FEMORAL ARTERY WITH NONAUTOLOGOUS TISSUE SUBSTITUTE, PERCUTANEOUS APPROACH: ICD-10-PCS | Performed by: SURGERY

## 2020-01-01 PROCEDURE — 86923 COMPATIBILITY TEST ELECTRIC: CPT

## 2020-01-01 PROCEDURE — 82140 ASSAY OF AMMONIA: CPT

## 2020-01-01 PROCEDURE — 6360000004 HC RX CONTRAST MEDICATION: Performed by: EMERGENCY MEDICINE

## 2020-01-01 PROCEDURE — 2580000003 HC RX 258: Performed by: PEDIATRICS

## 2020-01-01 PROCEDURE — 85610 PROTHROMBIN TIME: CPT

## 2020-01-01 PROCEDURE — 87070 CULTURE OTHR SPECIMN AEROBIC: CPT

## 2020-01-01 PROCEDURE — 6360000002 HC RX W HCPCS: Performed by: EMERGENCY MEDICINE

## 2020-01-01 PROCEDURE — 2500000003 HC RX 250 WO HCPCS: Performed by: INTERNAL MEDICINE

## 2020-01-01 PROCEDURE — 99255 IP/OBS CONSLTJ NEW/EST HI 80: CPT | Performed by: INTERNAL MEDICINE

## 2020-01-01 PROCEDURE — 0BJ08ZZ INSPECTION OF TRACHEOBRONCHIAL TREE, VIA NATURAL OR ARTIFICIAL OPENING ENDOSCOPIC: ICD-10-PCS | Performed by: INTERNAL MEDICINE

## 2020-01-01 PROCEDURE — 36200 PLACE CATHETER IN AORTA: CPT | Performed by: SURGERY

## 2020-01-01 PROCEDURE — 87807 RSV ASSAY W/OPTIC: CPT

## 2020-01-01 PROCEDURE — 83690 ASSAY OF LIPASE: CPT

## 2020-01-01 PROCEDURE — 84478 ASSAY OF TRIGLYCERIDES: CPT

## 2020-01-01 PROCEDURE — B24BZZ4 ULTRASONOGRAPHY OF HEART WITH AORTA, TRANSESOPHAGEAL: ICD-10-PCS | Performed by: THORACIC SURGERY (CARDIOTHORACIC VASCULAR SURGERY)

## 2020-01-01 PROCEDURE — 2500000003 HC RX 250 WO HCPCS

## 2020-01-01 PROCEDURE — 6360000002 HC RX W HCPCS: Performed by: NURSE ANESTHETIST, CERTIFIED REGISTERED

## 2020-01-01 PROCEDURE — 3600000018 HC SURGERY OHS ADDTL 15MIN: Performed by: THORACIC SURGERY (CARDIOTHORACIC VASCULAR SURGERY)

## 2020-01-01 PROCEDURE — 6360000002 HC RX W HCPCS: Performed by: PHYSICIAN ASSISTANT

## 2020-01-01 PROCEDURE — 87252 VIRUS INOCULATION TISSUE: CPT

## 2020-01-01 PROCEDURE — 2709999900 HC NON-CHARGEABLE SUPPLY

## 2020-01-01 PROCEDURE — 33881 EVASC RPR TA NDGFT XCOV LSA: CPT | Performed by: SURGERY

## 2020-01-01 PROCEDURE — 2500000003 HC RX 250 WO HCPCS: Performed by: SURGERY

## 2020-01-01 PROCEDURE — 93010 ELECTROCARDIOGRAM REPORT: CPT | Performed by: INTERNAL MEDICINE

## 2020-01-01 PROCEDURE — 87116 MYCOBACTERIA CULTURE: CPT

## 2020-01-01 PROCEDURE — L8670 VASCULAR GRAFT, SYNTHETIC: HCPCS | Performed by: THORACIC SURGERY (CARDIOTHORACIC VASCULAR SURGERY)

## 2020-01-01 PROCEDURE — C1753 CATH, INTRAVAS ULTRASOUND: HCPCS

## 2020-01-01 PROCEDURE — 80076 HEPATIC FUNCTION PANEL: CPT

## 2020-01-01 PROCEDURE — 80074 ACUTE HEPATITIS PANEL: CPT

## 2020-01-01 PROCEDURE — 34812 OPN FEM ART EXPOS: CPT | Performed by: SURGERY

## 2020-01-01 PROCEDURE — 2709999900 HC NON-CHARGEABLE SUPPLY: Performed by: THORACIC SURGERY (CARDIOTHORACIC VASCULAR SURGERY)

## 2020-01-01 PROCEDURE — P9016 RBC LEUKOCYTES REDUCED: HCPCS

## 2020-01-01 PROCEDURE — 6360000002 HC RX W HCPCS: Performed by: FAMILY MEDICINE

## 2020-01-01 PROCEDURE — P9041 ALBUMIN (HUMAN),5%, 50ML: HCPCS | Performed by: THORACIC SURGERY (CARDIOTHORACIC VASCULAR SURGERY)

## 2020-01-01 PROCEDURE — 72170 X-RAY EXAM OF PELVIS: CPT

## 2020-01-01 PROCEDURE — 2580000003 HC RX 258: Performed by: ANESTHESIOLOGY

## 2020-01-01 PROCEDURE — 6360000004 HC RX CONTRAST MEDICATION: Performed by: SURGERY

## 2020-01-01 PROCEDURE — 3700000000 HC ANESTHESIA ATTENDED CARE: Performed by: THORACIC SURGERY (CARDIOTHORACIC VASCULAR SURGERY)

## 2020-01-01 PROCEDURE — 31622 DX BRONCHOSCOPE/WASH: CPT | Performed by: INTERNAL MEDICINE

## 2020-01-01 PROCEDURE — P9041 ALBUMIN (HUMAN),5%, 50ML: HCPCS | Performed by: NURSE ANESTHETIST, CERTIFIED REGISTERED

## 2020-01-01 PROCEDURE — 34716 OPN AX/SUBCLA ART EXPOS CNDT: CPT | Performed by: THORACIC SURGERY (CARDIOTHORACIC VASCULAR SURGERY)

## 2020-01-01 PROCEDURE — 83615 LACTATE (LD) (LDH) ENZYME: CPT

## 2020-01-01 PROCEDURE — 87081 CULTURE SCREEN ONLY: CPT

## 2020-01-01 PROCEDURE — 2500000003 HC RX 250 WO HCPCS: Performed by: ANESTHESIOLOGY

## 2020-01-01 PROCEDURE — 96365 THER/PROPH/DIAG IV INF INIT: CPT

## 2020-01-01 PROCEDURE — 3600000017 HC SURGERY HYBRID ADDL 15MIN: Performed by: SURGERY

## 2020-01-01 PROCEDURE — 2500000003 HC RX 250 WO HCPCS: Performed by: PHYSICIAN ASSISTANT

## 2020-01-01 PROCEDURE — 86901 BLOOD TYPING SEROLOGIC RH(D): CPT

## 2020-01-01 PROCEDURE — 80307 DRUG TEST PRSMV CHEM ANLYZR: CPT

## 2020-01-01 PROCEDURE — 84550 ASSAY OF BLOOD/URIC ACID: CPT

## 2020-01-01 PROCEDURE — 36556 INSERT NON-TUNNEL CV CATH: CPT

## 2020-01-01 PROCEDURE — 87340 HEPATITIS B SURFACE AG IA: CPT

## 2020-01-01 PROCEDURE — 2500000003 HC RX 250 WO HCPCS: Performed by: EMERGENCY MEDICINE

## 2020-01-01 PROCEDURE — 87254 VIRUS INOCULATION SHELL VIA: CPT

## 2020-01-01 PROCEDURE — 86706 HEP B SURFACE ANTIBODY: CPT

## 2020-01-01 PROCEDURE — 88112 CYTOPATH CELL ENHANCE TECH: CPT

## 2020-01-01 PROCEDURE — 35226 REPAIR BLOOD VESSEL DIR LXTR: CPT | Performed by: SURGERY

## 2020-01-01 PROCEDURE — 85014 HEMATOCRIT: CPT

## 2020-01-01 PROCEDURE — 06HM33Z INSERTION OF INFUSION DEVICE INTO RIGHT FEMORAL VEIN, PERCUTANEOUS APPROACH: ICD-10-PCS | Performed by: INTERNAL MEDICINE

## 2020-01-01 PROCEDURE — 88184 FLOWCYTOMETRY/ TC 1 MARKER: CPT

## 2020-01-01 PROCEDURE — 6370000000 HC RX 637 (ALT 250 FOR IP): Performed by: EMERGENCY MEDICINE

## 2020-01-01 PROCEDURE — 0B9J8ZX DRAINAGE OF LEFT LOWER LUNG LOBE, VIA NATURAL OR ARTIFICIAL OPENING ENDOSCOPIC, DIAGNOSTIC: ICD-10-PCS | Performed by: INTERNAL MEDICINE

## 2020-01-01 PROCEDURE — 71275 CT ANGIOGRAPHY CHEST: CPT

## 2020-01-01 PROCEDURE — 83880 ASSAY OF NATRIURETIC PEPTIDE: CPT

## 2020-01-01 PROCEDURE — 87206 SMEAR FLUORESCENT/ACID STAI: CPT

## 2020-01-01 PROCEDURE — 4A1239Z MONITORING OF CARDIAC OUTPUT, PERCUTANEOUS APPROACH: ICD-10-PCS | Performed by: ANESTHESIOLOGY

## 2020-01-01 PROCEDURE — 89050 BODY FLUID CELL COUNT: CPT

## 2020-01-01 PROCEDURE — C1894 INTRO/SHEATH, NON-LASER: HCPCS

## 2020-01-01 PROCEDURE — C1887 CATHETER, GUIDING: HCPCS

## 2020-01-01 PROCEDURE — P9047 ALBUMIN (HUMAN), 25%, 50ML: HCPCS | Performed by: INTERNAL MEDICINE

## 2020-01-01 PROCEDURE — C1768 GRAFT, VASCULAR: HCPCS | Performed by: SURGERY

## 2020-01-01 PROCEDURE — 85347 COAGULATION TIME ACTIVATED: CPT

## 2020-01-01 PROCEDURE — 94002 VENT MGMT INPAT INIT DAY: CPT

## 2020-01-01 PROCEDURE — 2780000010 HC IMPLANT OTHER: Performed by: THORACIC SURGERY (CARDIOTHORACIC VASCULAR SURGERY)

## 2020-01-01 PROCEDURE — 6360000002 HC RX W HCPCS: Performed by: ANESTHESIOLOGY

## 2020-01-01 PROCEDURE — 85045 AUTOMATED RETICULOCYTE COUNT: CPT

## 2020-01-01 PROCEDURE — C1725 CATH, TRANSLUMIN NON-LASER: HCPCS

## 2020-01-01 PROCEDURE — 37236 OPEN/PERQ PLACE STENT 1ST: CPT | Performed by: SURGERY

## 2020-01-01 PROCEDURE — 87102 FUNGUS ISOLATION CULTURE: CPT

## 2020-01-01 PROCEDURE — 3700000000 HC ANESTHESIA ATTENDED CARE: Performed by: SURGERY

## 2020-01-01 PROCEDURE — C1768 GRAFT, VASCULAR: HCPCS | Performed by: THORACIC SURGERY (CARDIOTHORACIC VASCULAR SURGERY)

## 2020-01-01 PROCEDURE — 33858 AS-AORT GRF F/AORTIC DSJ: CPT | Performed by: THORACIC SURGERY (CARDIOTHORACIC VASCULAR SURGERY)

## 2020-01-01 PROCEDURE — 75957 PR ENDOVASC REPAIR THOR AORTA EXCL SUBCLAVIAN: CPT | Performed by: SURGERY

## 2020-01-01 PROCEDURE — 99024 POSTOP FOLLOW-UP VISIT: CPT | Performed by: SURGERY

## 2020-01-01 PROCEDURE — 2709999900 HC NON-CHARGEABLE SUPPLY: Performed by: SURGERY

## 2020-01-01 PROCEDURE — 96375 TX/PRO/DX INJ NEW DRUG ADDON: CPT

## 2020-01-01 PROCEDURE — 93306 TTE W/DOPPLER COMPLETE: CPT

## 2020-01-01 PROCEDURE — 87106 FUNGI IDENTIFICATION YEAST: CPT

## 2020-01-01 PROCEDURE — 86900 BLOOD TYPING SEROLOGIC ABO: CPT

## 2020-01-01 PROCEDURE — 89220 SPUTUM SPECIMEN COLLECTION: CPT

## 2020-01-01 PROCEDURE — 3700000001 HC ADD 15 MINUTES (ANESTHESIA): Performed by: THORACIC SURGERY (CARDIOTHORACIC VASCULAR SURGERY)

## 2020-01-01 PROCEDURE — 99292 CRITICAL CARE ADDL 30 MIN: CPT | Performed by: INTERNAL MEDICINE

## 2020-01-01 PROCEDURE — 5A1221Z PERFORMANCE OF CARDIAC OUTPUT, CONTINUOUS: ICD-10-PCS | Performed by: THORACIC SURGERY (CARDIOTHORACIC VASCULAR SURGERY)

## 2020-01-01 PROCEDURE — 86704 HEP B CORE ANTIBODY TOTAL: CPT

## 2020-01-01 PROCEDURE — C1729 CATH, DRAINAGE: HCPCS | Performed by: THORACIC SURGERY (CARDIOTHORACIC VASCULAR SURGERY)

## 2020-01-01 PROCEDURE — 31622 DX BRONCHOSCOPE/WASH: CPT

## 2020-01-01 PROCEDURE — 3600000007 HC SURGERY HYBRID BASE: Performed by: SURGERY

## 2020-01-01 PROCEDURE — 3E1M39Z IRRIGATION OF PERITONEAL CAVITY USING DIALYSATE, PERCUTANEOUS APPROACH: ICD-10-PCS | Performed by: INTERNAL MEDICINE

## 2020-01-01 PROCEDURE — 92950 HEART/LUNG RESUSCITATION CPR: CPT

## 2020-01-01 PROCEDURE — P9035 PLATELET PHERES LEUKOREDUCED: HCPCS

## 2020-01-01 PROCEDURE — 2580000003 HC RX 258: Performed by: FAMILY MEDICINE

## 2020-01-01 PROCEDURE — 6370000000 HC RX 637 (ALT 250 FOR IP): Performed by: PEDIATRICS

## 2020-01-01 PROCEDURE — 36600 WITHDRAWAL OF ARTERIAL BLOOD: CPT

## 2020-01-01 PROCEDURE — 87015 SPECIMEN INFECT AGNT CONCNTJ: CPT

## 2020-01-01 PROCEDURE — 87077 CULTURE AEROBIC IDENTIFY: CPT

## 2020-01-01 PROCEDURE — 2780000010 HC IMPLANT OTHER: Performed by: SURGERY

## 2020-01-01 PROCEDURE — 93005 ELECTROCARDIOGRAM TRACING: CPT | Performed by: NURSE PRACTITIONER

## 2020-01-01 PROCEDURE — 3700000001 HC ADD 15 MINUTES (ANESTHESIA): Performed by: SURGERY

## 2020-01-01 PROCEDURE — 87641 MR-STAPH DNA AMP PROBE: CPT

## 2020-01-01 PROCEDURE — 04V03DZ RESTRICTION OF ABDOMINAL AORTA WITH INTRALUMINAL DEVICE, PERCUTANEOUS APPROACH: ICD-10-PCS | Performed by: SURGERY

## 2020-01-01 DEVICE — GRAFT VASC L15CM BOR 8MM THORACOABDOMINAL POLY GEL SEAL STR: Type: IMPLANTABLE DEVICE | Site: ARTERIAL | Status: FUNCTIONAL

## 2020-01-01 DEVICE — XENOSURE BIOLOGIC PATCH, 0.8CM X 8CM, EIFU
Type: IMPLANTABLE DEVICE | Site: GROIN | Status: FUNCTIONAL
Brand: XENOSURE BIOLOGIC PATCH

## 2020-01-01 DEVICE — IMPLANTABLE DEVICE: Type: IMPLANTABLE DEVICE | Site: GROIN | Status: FUNCTIONAL

## 2020-01-01 DEVICE — GRAFT VASC WOVN DBL VELR STR 30MMX30CM: Type: IMPLANTABLE DEVICE | Site: AORTA | Status: FUNCTIONAL

## 2020-01-01 RX ORDER — SODIUM CHLORIDE 9 MG/ML
INJECTION, SOLUTION INTRAVENOUS CONTINUOUS PRN
Status: DISCONTINUED | OUTPATIENT
Start: 2020-01-01 | End: 2020-01-01 | Stop reason: SDUPTHER

## 2020-01-01 RX ORDER — EPINEPHRINE 1 MG/ML
INJECTION, SOLUTION, CONCENTRATE INTRAVENOUS
Status: COMPLETED | OUTPATIENT
Start: 2020-01-01 | End: 2020-01-01

## 2020-01-01 RX ORDER — MILRINONE LACTATE 0.2 MG/ML
0.38 INJECTION, SOLUTION INTRAVENOUS CONTINUOUS PRN
Status: DISCONTINUED | OUTPATIENT
Start: 2020-01-01 | End: 2020-01-01

## 2020-01-01 RX ORDER — NITROGLYCERIN 0.4 MG/1
0.4 TABLET SUBLINGUAL EVERY 5 MIN PRN
Status: DISCONTINUED | OUTPATIENT
Start: 2020-01-01 | End: 2020-01-01

## 2020-01-01 RX ORDER — MIDAZOLAM HYDROCHLORIDE 1 MG/ML
2 INJECTION INTRAMUSCULAR; INTRAVENOUS EVERY 30 MIN PRN
Status: DISCONTINUED | OUTPATIENT
Start: 2020-01-01 | End: 2020-01-01

## 2020-01-01 RX ORDER — CALCIUM GLUCONATE 20 MG/ML
1 INJECTION, SOLUTION INTRAVENOUS ONCE
Status: COMPLETED | OUTPATIENT
Start: 2020-01-01 | End: 2020-01-01

## 2020-01-01 RX ORDER — OXYCODONE HYDROCHLORIDE 10 MG/1
10 TABLET ORAL EVERY 4 HOURS PRN
Status: DISCONTINUED | OUTPATIENT
Start: 2020-01-01 | End: 2020-01-01

## 2020-01-01 RX ORDER — SUCCINYLCHOLINE/SOD CL,ISO/PF 200MG/10ML
SYRINGE (ML) INTRAVENOUS PRN
Status: DISCONTINUED | OUTPATIENT
Start: 2020-01-01 | End: 2020-01-01 | Stop reason: SDUPTHER

## 2020-01-01 RX ORDER — LABETALOL HYDROCHLORIDE 5 MG/ML
INJECTION, SOLUTION INTRAVENOUS
Status: DISPENSED
Start: 2020-01-01 | End: 2020-01-01

## 2020-01-01 RX ORDER — OXYCODONE HYDROCHLORIDE 5 MG/1
5 TABLET ORAL EVERY 4 HOURS PRN
Status: DISCONTINUED | OUTPATIENT
Start: 2020-01-01 | End: 2020-01-01

## 2020-01-01 RX ORDER — HEPARIN SODIUM 1000 [USP'U]/ML
1000 INJECTION, SOLUTION INTRAVENOUS; SUBCUTANEOUS PRN
Status: DISCONTINUED | OUTPATIENT
Start: 2020-01-01 | End: 2020-01-01

## 2020-01-01 RX ORDER — FUROSEMIDE 10 MG/ML
40 INJECTION INTRAMUSCULAR; INTRAVENOUS ONCE
Status: COMPLETED | OUTPATIENT
Start: 2020-01-01 | End: 2020-01-01

## 2020-01-01 RX ORDER — DEXTROSE MONOHYDRATE 50 MG/ML
100 INJECTION, SOLUTION INTRAVENOUS PRN
Status: DISCONTINUED | OUTPATIENT
Start: 2020-01-01 | End: 2020-03-17 | Stop reason: HOSPADM

## 2020-01-01 RX ORDER — CALCIUM CHLORIDE 100 MG/ML
1 INJECTION INTRAVENOUS; INTRAVENTRICULAR ONCE
Status: COMPLETED | OUTPATIENT
Start: 2020-01-01 | End: 2020-01-01

## 2020-01-01 RX ORDER — DOBUTAMINE HYDROCHLORIDE 200 MG/100ML
2.5 INJECTION INTRAVENOUS CONTINUOUS
Status: DISCONTINUED | OUTPATIENT
Start: 2020-01-01 | End: 2020-01-01

## 2020-01-01 RX ORDER — KETAMINE HCL IN NACL, ISO-OSM 100MG/10ML
SYRINGE (ML) INJECTION PRN
Status: DISCONTINUED | OUTPATIENT
Start: 2020-01-01 | End: 2020-01-01 | Stop reason: SDUPTHER

## 2020-01-01 RX ORDER — CALCIUM CHLORIDE 100 MG/ML
INJECTION INTRAVENOUS; INTRAVENTRICULAR
Status: COMPLETED | OUTPATIENT
Start: 2020-01-01 | End: 2020-01-01

## 2020-01-01 RX ORDER — SODIUM CHLORIDE 9 MG/ML
INJECTION, SOLUTION INTRAVENOUS CONTINUOUS
Status: DISCONTINUED | OUTPATIENT
Start: 2020-01-01 | End: 2020-01-01

## 2020-01-01 RX ORDER — AMIODARONE HYDROCHLORIDE 50 MG/ML
INJECTION, SOLUTION INTRAVENOUS
Status: COMPLETED | OUTPATIENT
Start: 2020-01-01 | End: 2020-01-01

## 2020-01-01 RX ORDER — HYDRALAZINE HYDROCHLORIDE 20 MG/ML
5 INJECTION INTRAMUSCULAR; INTRAVENOUS EVERY 5 MIN PRN
Status: DISCONTINUED | OUTPATIENT
Start: 2020-01-01 | End: 2020-03-17 | Stop reason: HOSPADM

## 2020-01-01 RX ORDER — 0.9 % SODIUM CHLORIDE 0.9 %
250 INTRAVENOUS SOLUTION INTRAVENOUS ONCE
Status: DISCONTINUED | OUTPATIENT
Start: 2020-01-01 | End: 2020-01-01

## 2020-01-01 RX ORDER — HEPARIN SODIUM 1000 [USP'U]/ML
1000 INJECTION, SOLUTION INTRAVENOUS; SUBCUTANEOUS PRN
Status: DISCONTINUED | OUTPATIENT
Start: 2020-01-01 | End: 2020-03-17 | Stop reason: HOSPADM

## 2020-01-01 RX ORDER — METOPROLOL TARTRATE 5 MG/5ML
2.5 INJECTION INTRAVENOUS EVERY 6 HOURS
Status: DISCONTINUED | OUTPATIENT
Start: 2020-01-01 | End: 2020-01-01

## 2020-01-01 RX ORDER — HEPARIN SODIUM 1000 [USP'U]/ML
INJECTION, SOLUTION INTRAVENOUS; SUBCUTANEOUS PRN
Status: DISCONTINUED | OUTPATIENT
Start: 2020-01-01 | End: 2020-01-01 | Stop reason: SDUPTHER

## 2020-01-01 RX ORDER — 0.9 % SODIUM CHLORIDE 0.9 %
20 INTRAVENOUS SOLUTION INTRAVENOUS ONCE
Status: DISCONTINUED | OUTPATIENT
Start: 2020-01-01 | End: 2020-03-17 | Stop reason: HOSPADM

## 2020-01-01 RX ORDER — MIDAZOLAM HYDROCHLORIDE 1 MG/ML
1 INJECTION INTRAMUSCULAR; INTRAVENOUS
Status: DISCONTINUED | OUTPATIENT
Start: 2020-01-01 | End: 2020-01-01

## 2020-01-01 RX ORDER — FENTANYL CITRATE 50 UG/ML
25 INJECTION, SOLUTION INTRAMUSCULAR; INTRAVENOUS EVERY 5 MIN PRN
Status: DISCONTINUED | OUTPATIENT
Start: 2020-01-01 | End: 2020-01-01

## 2020-01-01 RX ORDER — DEXTROSE MONOHYDRATE 25 G/50ML
INJECTION, SOLUTION INTRAVENOUS
Status: COMPLETED | OUTPATIENT
Start: 2020-01-01 | End: 2020-01-01

## 2020-01-01 RX ORDER — SODIUM CHLORIDE 0.9 % (FLUSH) 0.9 %
10 SYRINGE (ML) INJECTION EVERY 12 HOURS SCHEDULED
Status: DISCONTINUED | OUTPATIENT
Start: 2020-01-01 | End: 2020-03-17 | Stop reason: HOSPADM

## 2020-01-01 RX ORDER — PHENYLEPHRINE HCL IN 0.9% NACL 1 MG/10 ML
SYRINGE (ML) INTRAVENOUS PRN
Status: DISCONTINUED | OUTPATIENT
Start: 2020-01-01 | End: 2020-01-01 | Stop reason: SDUPTHER

## 2020-01-01 RX ORDER — LABETALOL HYDROCHLORIDE 5 MG/ML
10 INJECTION, SOLUTION INTRAVENOUS ONCE
Status: COMPLETED | OUTPATIENT
Start: 2020-01-01 | End: 2020-01-01

## 2020-01-01 RX ORDER — PROPOFOL 10 MG/ML
10 INJECTION, EMULSION INTRAVENOUS
Status: DISCONTINUED | OUTPATIENT
Start: 2020-01-01 | End: 2020-03-17 | Stop reason: HOSPADM

## 2020-01-01 RX ORDER — SODIUM CHLORIDE 0.9 % (FLUSH) 0.9 %
10 SYRINGE (ML) INJECTION EVERY 12 HOURS SCHEDULED
Status: DISCONTINUED | OUTPATIENT
Start: 2020-01-01 | End: 2020-01-01

## 2020-01-01 RX ORDER — ONDANSETRON 2 MG/ML
4 INJECTION INTRAMUSCULAR; INTRAVENOUS ONCE
Status: COMPLETED | OUTPATIENT
Start: 2020-01-01 | End: 2020-01-01

## 2020-01-01 RX ORDER — DEXTROSE MONOHYDRATE 50 MG/ML
INJECTION, SOLUTION INTRAVENOUS CONTINUOUS
Status: DISCONTINUED | OUTPATIENT
Start: 2020-01-01 | End: 2020-03-17 | Stop reason: HOSPADM

## 2020-01-01 RX ORDER — EPINEPHRINE 1 MG/ML
INJECTION, SOLUTION, CONCENTRATE INTRAVENOUS PRN
Status: DISCONTINUED | OUTPATIENT
Start: 2020-01-01 | End: 2020-01-01

## 2020-01-01 RX ORDER — INSULIN GLARGINE 100 [IU]/ML
0.15 INJECTION, SOLUTION SUBCUTANEOUS NIGHTLY
Status: DISCONTINUED | OUTPATIENT
Start: 2020-01-01 | End: 2020-01-01

## 2020-01-01 RX ORDER — POTASSIUM CHLORIDE 29.8 MG/ML
20 INJECTION INTRAVENOUS PRN
Status: DISCONTINUED | OUTPATIENT
Start: 2020-01-01 | End: 2020-01-01

## 2020-01-01 RX ORDER — MEPERIDINE HYDROCHLORIDE 50 MG/ML
25 INJECTION INTRAMUSCULAR; INTRAVENOUS; SUBCUTANEOUS
Status: ACTIVE | OUTPATIENT
Start: 2020-01-01 | End: 2020-01-01

## 2020-01-01 RX ORDER — METOPROLOL TARTRATE 5 MG/5ML
2.5 INJECTION INTRAVENOUS EVERY 10 MIN PRN
Status: DISCONTINUED | OUTPATIENT
Start: 2020-01-01 | End: 2020-03-17 | Stop reason: HOSPADM

## 2020-01-01 RX ORDER — SODIUM CHLORIDE 0.9 % (FLUSH) 0.9 %
10 SYRINGE (ML) INJECTION PRN
Status: DISCONTINUED | OUTPATIENT
Start: 2020-01-01 | End: 2020-01-01

## 2020-01-01 RX ORDER — MIDAZOLAM HYDROCHLORIDE 1 MG/ML
1 INJECTION INTRAMUSCULAR; INTRAVENOUS ONCE
Status: COMPLETED | OUTPATIENT
Start: 2020-01-01 | End: 2020-01-01

## 2020-01-01 RX ORDER — FUROSEMIDE 10 MG/ML
40 INJECTION INTRAMUSCULAR; INTRAVENOUS 2 TIMES DAILY
Status: DISCONTINUED | OUTPATIENT
Start: 2020-01-01 | End: 2020-01-01

## 2020-01-01 RX ORDER — PROMETHAZINE HYDROCHLORIDE 25 MG/ML
6.25 INJECTION, SOLUTION INTRAMUSCULAR; INTRAVENOUS
Status: DISCONTINUED | OUTPATIENT
Start: 2020-01-01 | End: 2020-01-01

## 2020-01-01 RX ORDER — FENTANYL CITRATE 50 UG/ML
25 INJECTION, SOLUTION INTRAMUSCULAR; INTRAVENOUS
Status: DISCONTINUED | OUTPATIENT
Start: 2020-01-01 | End: 2020-03-17 | Stop reason: HOSPADM

## 2020-01-01 RX ORDER — SODIUM CHLORIDE 0.9 % (FLUSH) 0.9 %
10 SYRINGE (ML) INJECTION PRN
Status: DISCONTINUED | OUTPATIENT
Start: 2020-01-01 | End: 2020-03-17 | Stop reason: HOSPADM

## 2020-01-01 RX ORDER — POTASSIUM CHLORIDE 29.8 MG/ML
20 INJECTION INTRAVENOUS PRN
Status: DISCONTINUED | OUTPATIENT
Start: 2020-01-01 | End: 2020-03-17 | Stop reason: HOSPADM

## 2020-01-01 RX ORDER — SENNA AND DOCUSATE SODIUM 50; 8.6 MG/1; MG/1
1 TABLET, FILM COATED ORAL 2 TIMES DAILY
Status: DISCONTINUED | OUTPATIENT
Start: 2020-01-01 | End: 2020-01-01

## 2020-01-01 RX ORDER — ACETAMINOPHEN 325 MG/1
650 TABLET ORAL EVERY 4 HOURS PRN
Status: DISCONTINUED | OUTPATIENT
Start: 2020-01-01 | End: 2020-01-01

## 2020-01-01 RX ORDER — NITROGLYCERIN 20 MG/100ML
INJECTION INTRAVENOUS PRN
Status: DISCONTINUED | OUTPATIENT
Start: 2020-01-01 | End: 2020-01-01 | Stop reason: SDUPTHER

## 2020-01-01 RX ORDER — SUFENTANIL CITRATE 50 UG/ML
INJECTION EPIDURAL; INTRAVENOUS PRN
Status: DISCONTINUED | OUTPATIENT
Start: 2020-01-01 | End: 2020-01-01 | Stop reason: SDUPTHER

## 2020-01-01 RX ORDER — MAGNESIUM HYDROXIDE 1200 MG/15ML
LIQUID ORAL CONTINUOUS PRN
Status: COMPLETED | OUTPATIENT
Start: 2020-01-01 | End: 2020-01-01

## 2020-01-01 RX ORDER — ATROPINE SULFATE 0.1 MG/ML
INJECTION INTRAVENOUS
Status: DISPENSED
Start: 2020-01-01 | End: 2020-01-01

## 2020-01-01 RX ORDER — FENTANYL CITRATE 50 UG/ML
50 INJECTION, SOLUTION INTRAMUSCULAR; INTRAVENOUS EVERY 5 MIN PRN
Status: DISCONTINUED | OUTPATIENT
Start: 2020-01-01 | End: 2020-01-01

## 2020-01-01 RX ORDER — ALBUTEROL SULFATE 90 UG/1
2 AEROSOL, METERED RESPIRATORY (INHALATION) EVERY 6 HOURS PRN
Status: DISCONTINUED | OUTPATIENT
Start: 2020-01-01 | End: 2020-03-17 | Stop reason: HOSPADM

## 2020-01-01 RX ORDER — SODIUM CHLORIDE 9 MG/ML
10 INJECTION INTRAVENOUS DAILY
Status: DISCONTINUED | OUTPATIENT
Start: 2020-01-01 | End: 2020-03-17 | Stop reason: HOSPADM

## 2020-01-01 RX ORDER — CALCIUM CHLORIDE 100 MG/ML
INJECTION INTRAVENOUS; INTRAVENTRICULAR PRN
Status: DISCONTINUED | OUTPATIENT
Start: 2020-01-01 | End: 2020-01-01 | Stop reason: SDUPTHER

## 2020-01-01 RX ORDER — NITROGLYCERIN 20 MG/100ML
10 INJECTION INTRAVENOUS CONTINUOUS PRN
Status: DISCONTINUED | OUTPATIENT
Start: 2020-01-01 | End: 2020-01-01

## 2020-01-01 RX ORDER — PANTOPRAZOLE SODIUM 40 MG/10ML
40 INJECTION, POWDER, LYOPHILIZED, FOR SOLUTION INTRAVENOUS DAILY
Status: DISCONTINUED | OUTPATIENT
Start: 2020-01-01 | End: 2020-03-17 | Stop reason: HOSPADM

## 2020-01-01 RX ORDER — PROPOFOL 10 MG/ML
INJECTION, EMULSION INTRAVENOUS CONTINUOUS PRN
Status: DISCONTINUED | OUTPATIENT
Start: 2020-01-01 | End: 2020-01-01 | Stop reason: SDUPTHER

## 2020-01-01 RX ORDER — 0.9 % SODIUM CHLORIDE 0.9 %
20 INTRAVENOUS SOLUTION INTRAVENOUS ONCE
Status: DISCONTINUED | OUTPATIENT
Start: 2020-01-01 | End: 2020-01-01

## 2020-01-01 RX ORDER — CALCIUM CHLORIDE, MAGNESIUM CHLORIDE, DEXTROSE MONOHYDRATE, LACTIC ACID, SODIUM CHLORIDE, SODIUM BICARBONATE AND POTASSIUM CHLORIDE 5.15; 2.03; 22; 5.4; 6.46; 3.09; .157 G/L; G/L; G/L; G/L; G/L; G/L; G/L
INJECTION INTRAVENOUS CONTINUOUS
Status: DISCONTINUED | OUTPATIENT
Start: 2020-01-01 | End: 2020-01-01

## 2020-01-01 RX ORDER — ALBUMIN (HUMAN) 12.5 G/50ML
25 SOLUTION INTRAVENOUS ONCE
Status: COMPLETED | OUTPATIENT
Start: 2020-01-01 | End: 2020-01-01

## 2020-01-01 RX ORDER — FENTANYL CITRATE 50 UG/ML
50 INJECTION, SOLUTION INTRAMUSCULAR; INTRAVENOUS ONCE
Status: COMPLETED | OUTPATIENT
Start: 2020-01-01 | End: 2020-01-01

## 2020-01-01 RX ORDER — ASPIRIN 325 MG
325 TABLET ORAL DAILY
Status: DISCONTINUED | OUTPATIENT
Start: 2020-01-01 | End: 2020-01-01

## 2020-01-01 RX ORDER — FENTANYL CITRATE 50 UG/ML
100 INJECTION, SOLUTION INTRAMUSCULAR; INTRAVENOUS ONCE
Status: COMPLETED | OUTPATIENT
Start: 2020-01-01 | End: 2020-01-01

## 2020-01-01 RX ORDER — DEXTROSE MONOHYDRATE 25 G/50ML
25 INJECTION, SOLUTION INTRAVENOUS ONCE
Status: DISCONTINUED | OUTPATIENT
Start: 2020-01-01 | End: 2020-03-17 | Stop reason: HOSPADM

## 2020-01-01 RX ORDER — MAGNESIUM SULFATE 1 G/100ML
1 INJECTION INTRAVENOUS PRN
Status: DISCONTINUED | OUTPATIENT
Start: 2020-01-01 | End: 2020-03-17 | Stop reason: HOSPADM

## 2020-01-01 RX ORDER — METOCLOPRAMIDE HYDROCHLORIDE 5 MG/ML
10 INJECTION INTRAMUSCULAR; INTRAVENOUS ONCE
Status: DISCONTINUED | OUTPATIENT
Start: 2020-01-01 | End: 2020-01-01

## 2020-01-01 RX ORDER — ONDANSETRON 2 MG/ML
4 INJECTION INTRAMUSCULAR; INTRAVENOUS
Status: DISCONTINUED | OUTPATIENT
Start: 2020-01-01 | End: 2020-01-01

## 2020-01-01 RX ORDER — MIDAZOLAM HYDROCHLORIDE 1 MG/ML
INJECTION INTRAMUSCULAR; INTRAVENOUS PRN
Status: DISCONTINUED | OUTPATIENT
Start: 2020-01-01 | End: 2020-01-01 | Stop reason: SDUPTHER

## 2020-01-01 RX ORDER — MIDAZOLAM HYDROCHLORIDE 1 MG/ML
2 INJECTION INTRAMUSCULAR; INTRAVENOUS
Status: DISCONTINUED | OUTPATIENT
Start: 2020-01-01 | End: 2020-03-17 | Stop reason: HOSPADM

## 2020-01-01 RX ORDER — HEPARIN SODIUM 5000 [USP'U]/ML
5000 INJECTION, SOLUTION INTRAVENOUS; SUBCUTANEOUS 2 TIMES DAILY
Status: DISCONTINUED | OUTPATIENT
Start: 2020-01-01 | End: 2020-01-01

## 2020-01-01 RX ORDER — SODIUM CHLORIDE, SODIUM LACTATE, POTASSIUM CHLORIDE, CALCIUM CHLORIDE 600; 310; 30; 20 MG/100ML; MG/100ML; MG/100ML; MG/100ML
INJECTION, SOLUTION INTRAVENOUS CONTINUOUS PRN
Status: DISCONTINUED | OUTPATIENT
Start: 2020-01-01 | End: 2020-01-01 | Stop reason: SDUPTHER

## 2020-01-01 RX ORDER — NITROGLYCERIN 20 MG/100ML
5 INJECTION INTRAVENOUS CONTINUOUS
Status: DISCONTINUED | OUTPATIENT
Start: 2020-01-01 | End: 2020-03-17 | Stop reason: HOSPADM

## 2020-01-01 RX ORDER — HYDROCODONE BITARTRATE AND ACETAMINOPHEN 5; 325 MG/1; MG/1
2 TABLET ORAL PRN
Status: DISCONTINUED | OUTPATIENT
Start: 2020-01-01 | End: 2020-01-01

## 2020-01-01 RX ORDER — ALBUMIN (HUMAN) 12.5 G/50ML
25 SOLUTION INTRAVENOUS PRN
Status: DISCONTINUED | OUTPATIENT
Start: 2020-01-01 | End: 2020-01-01

## 2020-01-01 RX ORDER — IPRATROPIUM BROMIDE AND ALBUTEROL SULFATE 2.5; .5 MG/3ML; MG/3ML
1 SOLUTION RESPIRATORY (INHALATION) EVERY 4 HOURS
Status: DISCONTINUED | OUTPATIENT
Start: 2020-01-01 | End: 2020-01-01

## 2020-01-01 RX ORDER — POTASSIUM CHLORIDE 750 MG/1
10 TABLET, FILM COATED, EXTENDED RELEASE ORAL
Status: DISCONTINUED | OUTPATIENT
Start: 2020-01-01 | End: 2020-01-01

## 2020-01-01 RX ORDER — AMINOCAPROIC ACID 250 MG/ML
INJECTION, SOLUTION INTRAVENOUS PRN
Status: DISCONTINUED | OUTPATIENT
Start: 2020-01-01 | End: 2020-01-01 | Stop reason: SDUPTHER

## 2020-01-01 RX ORDER — FENTANYL CITRATE 50 UG/ML
25 INJECTION, SOLUTION INTRAMUSCULAR; INTRAVENOUS
Status: DISCONTINUED | OUTPATIENT
Start: 2020-01-01 | End: 2020-01-01 | Stop reason: SDUPTHER

## 2020-01-01 RX ORDER — ALBUMIN (HUMAN) 12.5 G/50ML
SOLUTION INTRAVENOUS
Status: DISCONTINUED
Start: 2020-01-01 | End: 2020-01-01 | Stop reason: WASHOUT

## 2020-01-01 RX ORDER — MILRINONE LACTATE 0.2 MG/ML
INJECTION, SOLUTION INTRAVENOUS CONTINUOUS PRN
Status: DISCONTINUED | OUTPATIENT
Start: 2020-01-01 | End: 2020-01-01 | Stop reason: SDUPTHER

## 2020-01-01 RX ORDER — NITROGLYCERIN 40 MG/1
1 PATCH TRANSDERMAL DAILY
Status: COMPLETED | OUTPATIENT
Start: 2020-01-01 | End: 2020-01-01

## 2020-01-01 RX ORDER — CISATRACURIUM BESYLATE 2 MG/ML
INJECTION, SOLUTION INTRAVENOUS PRN
Status: DISCONTINUED | OUTPATIENT
Start: 2020-01-01 | End: 2020-01-01 | Stop reason: SDUPTHER

## 2020-01-01 RX ORDER — MIDAZOLAM HYDROCHLORIDE 1 MG/ML
INJECTION INTRAMUSCULAR; INTRAVENOUS
Status: COMPLETED
Start: 2020-01-01 | End: 2020-01-01

## 2020-01-01 RX ORDER — CHLORHEXIDINE GLUCONATE 0.12 MG/ML
15 RINSE ORAL ONCE
Status: DISCONTINUED | OUTPATIENT
Start: 2020-01-01 | End: 2020-01-01

## 2020-01-01 RX ORDER — HEPARIN SODIUM 1000 [USP'U]/ML
1000 INJECTION, SOLUTION INTRAVENOUS; SUBCUTANEOUS
Status: ACTIVE | OUTPATIENT
Start: 2020-01-01 | End: 2020-01-01

## 2020-01-01 RX ORDER — ALBUMIN, HUMAN INJ 5% 5 %
SOLUTION INTRAVENOUS PRN
Status: DISCONTINUED | OUTPATIENT
Start: 2020-01-01 | End: 2020-01-01 | Stop reason: SDUPTHER

## 2020-01-01 RX ORDER — LISINOPRIL 5 MG/1
5 TABLET ORAL EVERY 24 HOURS
Status: DISCONTINUED | OUTPATIENT
Start: 2020-01-01 | End: 2020-01-01

## 2020-01-01 RX ORDER — CHLORHEXIDINE GLUCONATE 0.12 MG/ML
15 RINSE ORAL 2 TIMES DAILY
Status: DISCONTINUED | OUTPATIENT
Start: 2020-01-01 | End: 2020-03-17 | Stop reason: HOSPADM

## 2020-01-01 RX ORDER — LIDOCAINE HYDROCHLORIDE 10 MG/ML
INJECTION, SOLUTION EPIDURAL; INFILTRATION; INTRACAUDAL; PERINEURAL
Status: COMPLETED
Start: 2020-01-01 | End: 2020-01-01

## 2020-01-01 RX ORDER — PROTAMINE SULFATE 10 MG/ML
INJECTION, SOLUTION INTRAVENOUS PRN
Status: DISCONTINUED | OUTPATIENT
Start: 2020-01-01 | End: 2020-01-01 | Stop reason: SDUPTHER

## 2020-01-01 RX ORDER — ALBUMIN, HUMAN INJ 5% 5 %
25 SOLUTION INTRAVENOUS PRN
Status: DISCONTINUED | OUTPATIENT
Start: 2020-01-01 | End: 2020-01-01

## 2020-01-01 RX ORDER — IPRATROPIUM BROMIDE AND ALBUTEROL SULFATE 2.5; .5 MG/3ML; MG/3ML
1 SOLUTION RESPIRATORY (INHALATION) EVERY 8 HOURS
Status: DISCONTINUED | OUTPATIENT
Start: 2020-01-01 | End: 2020-03-17 | Stop reason: HOSPADM

## 2020-01-01 RX ORDER — DEXTROSE MONOHYDRATE 25 G/50ML
12.5 INJECTION, SOLUTION INTRAVENOUS PRN
Status: DISCONTINUED | OUTPATIENT
Start: 2020-01-01 | End: 2020-03-17 | Stop reason: HOSPADM

## 2020-01-01 RX ORDER — ONDANSETRON 2 MG/ML
4 INJECTION INTRAMUSCULAR; INTRAVENOUS EVERY 8 HOURS PRN
Status: DISCONTINUED | OUTPATIENT
Start: 2020-01-01 | End: 2020-03-17 | Stop reason: HOSPADM

## 2020-01-01 RX ORDER — CALCIUM GLUCONATE 20 MG/ML
1 INJECTION, SOLUTION INTRAVENOUS PRN
Status: DISCONTINUED | OUTPATIENT
Start: 2020-01-01 | End: 2020-01-01

## 2020-01-01 RX ORDER — MEPERIDINE HYDROCHLORIDE 50 MG/ML
12.5 INJECTION INTRAMUSCULAR; INTRAVENOUS; SUBCUTANEOUS ONCE
Status: DISCONTINUED | OUTPATIENT
Start: 2020-01-01 | End: 2020-01-01

## 2020-01-01 RX ORDER — CHLORHEXIDINE GLUCONATE 4 G/100ML
SOLUTION TOPICAL SEE ADMIN INSTRUCTIONS
Status: DISCONTINUED | OUTPATIENT
Start: 2020-01-01 | End: 2020-01-01

## 2020-01-01 RX ORDER — 0.9 % SODIUM CHLORIDE 0.9 %
500 INTRAVENOUS SOLUTION INTRAVENOUS CONTINUOUS PRN
Status: DISCONTINUED | OUTPATIENT
Start: 2020-01-01 | End: 2020-01-01

## 2020-01-01 RX ORDER — METOCLOPRAMIDE HYDROCHLORIDE 5 MG/ML
10 INJECTION INTRAMUSCULAR; INTRAVENOUS EVERY 6 HOURS PRN
Status: DISCONTINUED | OUTPATIENT
Start: 2020-01-01 | End: 2020-03-17 | Stop reason: HOSPADM

## 2020-01-01 RX ORDER — ALBUTEROL SULFATE 2.5 MG/3ML
2.5 SOLUTION RESPIRATORY (INHALATION)
Status: DISCONTINUED | OUTPATIENT
Start: 2020-01-01 | End: 2020-01-01

## 2020-01-01 RX ORDER — SODIUM CHLORIDE 9 MG/ML
INJECTION, SOLUTION INTRAVENOUS CONTINUOUS PRN
Status: DISCONTINUED | OUTPATIENT
Start: 2020-01-01 | End: 2020-01-01

## 2020-01-01 RX ORDER — NICOTINE POLACRILEX 4 MG
15 LOZENGE BUCCAL PRN
Status: DISCONTINUED | OUTPATIENT
Start: 2020-01-01 | End: 2020-03-17 | Stop reason: HOSPADM

## 2020-01-01 RX ORDER — ERYTHROMYCIN 5 MG/G
OINTMENT OPHTHALMIC 2 TIMES DAILY
Status: DISCONTINUED | OUTPATIENT
Start: 2020-01-01 | End: 2020-01-01

## 2020-01-01 RX ORDER — ERYTHROMYCIN 5 MG/G
OINTMENT OPHTHALMIC EVERY OTHER DAY
Status: DISCONTINUED | OUTPATIENT
Start: 2020-01-01 | End: 2020-03-17 | Stop reason: HOSPADM

## 2020-01-01 RX ORDER — LANOLIN ALCOHOL/MO/W.PET/CERES
400 CREAM (GRAM) TOPICAL 2 TIMES DAILY
Status: DISCONTINUED | OUTPATIENT
Start: 2020-01-01 | End: 2020-01-01

## 2020-01-01 RX ORDER — HYDROCODONE BITARTRATE AND ACETAMINOPHEN 5; 325 MG/1; MG/1
1 TABLET ORAL PRN
Status: DISCONTINUED | OUTPATIENT
Start: 2020-01-01 | End: 2020-01-01

## 2020-01-01 RX ORDER — PANTOPRAZOLE SODIUM 40 MG/1
40 TABLET, DELAYED RELEASE ORAL DAILY
Status: DISCONTINUED | OUTPATIENT
Start: 2020-01-01 | End: 2020-01-01

## 2020-01-01 RX ORDER — ATORVASTATIN CALCIUM 40 MG/1
40 TABLET, FILM COATED ORAL NIGHTLY
Status: DISCONTINUED | OUTPATIENT
Start: 2020-01-01 | End: 2020-01-01

## 2020-01-01 RX ORDER — MAGNESIUM SULFATE IN WATER 40 MG/ML
2 INJECTION, SOLUTION INTRAVENOUS PRN
Status: DISCONTINUED | OUTPATIENT
Start: 2020-01-01 | End: 2020-01-01

## 2020-01-01 RX ORDER — 0.9 % SODIUM CHLORIDE 0.9 %
2000 INTRAVENOUS SOLUTION INTRAVENOUS PRN
Status: DISCONTINUED | OUTPATIENT
Start: 2020-01-01 | End: 2020-01-01

## 2020-01-01 RX ORDER — HYDRALAZINE HYDROCHLORIDE 20 MG/ML
5 INJECTION INTRAMUSCULAR; INTRAVENOUS EVERY 10 MIN PRN
Status: DISCONTINUED | OUTPATIENT
Start: 2020-01-01 | End: 2020-01-01

## 2020-01-01 RX ORDER — ROCURONIUM BROMIDE 10 MG/ML
INJECTION, SOLUTION INTRAVENOUS PRN
Status: DISCONTINUED | OUTPATIENT
Start: 2020-01-01 | End: 2020-01-01 | Stop reason: SDUPTHER

## 2020-01-01 RX ORDER — PROTAMINE SULFATE 10 MG/ML
50 INJECTION, SOLUTION INTRAVENOUS
Status: ACTIVE | OUTPATIENT
Start: 2020-01-01 | End: 2020-01-01

## 2020-01-01 RX ORDER — DEXTROSE MONOHYDRATE 50 MG/ML
INJECTION, SOLUTION INTRAVENOUS CONTINUOUS PRN
Status: COMPLETED | OUTPATIENT
Start: 2020-01-01 | End: 2020-01-01

## 2020-01-01 RX ORDER — PROPOFOL 10 MG/ML
INJECTION, EMULSION INTRAVENOUS PRN
Status: DISCONTINUED | OUTPATIENT
Start: 2020-01-01 | End: 2020-01-01 | Stop reason: SDUPTHER

## 2020-01-01 RX ORDER — BISACODYL 10 MG
10 SUPPOSITORY, RECTAL RECTAL ONCE
Status: COMPLETED | OUTPATIENT
Start: 2020-01-01 | End: 2020-01-01

## 2020-01-01 RX ORDER — ESMOLOL HYDROCHLORIDE 10 MG/ML
INJECTION INTRAVENOUS PRN
Status: DISCONTINUED | OUTPATIENT
Start: 2020-01-01 | End: 2020-01-01 | Stop reason: SDUPTHER

## 2020-01-01 RX ORDER — MAGNESIUM SULFATE 1 G/100ML
1 INJECTION INTRAVENOUS PRN
Status: DISCONTINUED | OUTPATIENT
Start: 2020-01-01 | End: 2020-01-01

## 2020-01-01 RX ADMIN — SODIUM CHLORIDE 2000 ML: 9 INJECTION, SOLUTION INTRAVENOUS at 22:52

## 2020-01-01 RX ADMIN — SODIUM CHLORIDE 7.68 UNITS/HR: 9 INJECTION, SOLUTION INTRAVENOUS at 23:36

## 2020-01-01 RX ADMIN — MUPIROCIN: 20 OINTMENT TOPICAL at 21:41

## 2020-01-01 RX ADMIN — MUPIROCIN: 20 OINTMENT TOPICAL at 20:24

## 2020-01-01 RX ADMIN — PANTOPRAZOLE SODIUM 40 MG: 40 INJECTION, POWDER, FOR SOLUTION INTRAVENOUS at 09:04

## 2020-01-01 RX ADMIN — NICARDIPINE HYDROCHLORIDE 5 MG/HR: 0.1 INJECTION, SOLUTION INTRAVENOUS at 07:19

## 2020-01-01 RX ADMIN — NITROGLYCERIN 0.4 MG: 0.4 TABLET, ORALLY DISINTEGRATING SUBLINGUAL at 07:04

## 2020-01-01 RX ADMIN — CALCIUM CHLORIDE, MAGNESIUM CHLORIDE, DEXTROSE MONOHYDRATE, LACTIC ACID, SODIUM CHLORIDE AND SODIUM BICARBONATE 2000 ML/HR: 3.68; 3.05; 22; 5.4; 6.46; 3.09 INJECTION INTRAVENOUS at 00:21

## 2020-01-01 RX ADMIN — Medication 2000 ML/HR: at 19:30

## 2020-01-01 RX ADMIN — MEROPENEM 500 MG: 500 INJECTION, POWDER, FOR SOLUTION INTRAVENOUS at 00:24

## 2020-01-01 RX ADMIN — SODIUM CHLORIDE 250 ML: 9 INJECTION, SOLUTION INTRAVENOUS at 08:21

## 2020-01-01 RX ADMIN — Medication 30 MCG/MIN: at 04:49

## 2020-01-01 RX ADMIN — ERYTHROMYCIN: 5 OINTMENT OPHTHALMIC at 08:51

## 2020-01-01 RX ADMIN — CEFAZOLIN 3 G: 1 INJECTION, POWDER, FOR SOLUTION INTRAMUSCULAR; INTRAVENOUS at 05:16

## 2020-01-01 RX ADMIN — FENTANYL CITRATE 25 MCG: 50 INJECTION, SOLUTION INTRAMUSCULAR; INTRAVENOUS at 06:06

## 2020-01-01 RX ADMIN — SODIUM CHLORIDE 14.96 UNITS/HR: 9 INJECTION, SOLUTION INTRAVENOUS at 09:23

## 2020-01-01 RX ADMIN — SODIUM BICARBONATE: 84 INJECTION, SOLUTION INTRAVENOUS at 11:54

## 2020-01-01 RX ADMIN — METOPROLOL TARTRATE 25 MG: 25 TABLET, FILM COATED ORAL at 08:37

## 2020-01-01 RX ADMIN — Medication 125 MCG/HR: at 15:23

## 2020-01-01 RX ADMIN — CISATRACURIUM BESYLATE 10 MG: 2 INJECTION INTRAVENOUS at 17:36

## 2020-01-01 RX ADMIN — Medication 12.5 G: at 18:33

## 2020-01-01 RX ADMIN — PROPOFOL 35 MCG/KG/MIN: 10 INJECTION, EMULSION INTRAVENOUS at 11:58

## 2020-01-01 RX ADMIN — SODIUM CHLORIDE 2000 ML: 9 INJECTION, SOLUTION INTRAVENOUS at 23:14

## 2020-01-01 RX ADMIN — POTASSIUM CHLORIDE 20 MEQ: 29.8 INJECTION, SOLUTION INTRAVENOUS at 09:26

## 2020-01-01 RX ADMIN — SODIUM CHLORIDE 5 MG/HR: 9 INJECTION, SOLUTION INTRAVENOUS at 03:33

## 2020-01-01 RX ADMIN — MEROPENEM 500 MG: 500 INJECTION, POWDER, FOR SOLUTION INTRAVENOUS at 13:29

## 2020-01-01 RX ADMIN — CHLORHEXIDINE GLUCONATE 0.12% ORAL RINSE 15 ML: 1.2 LIQUID ORAL at 10:50

## 2020-01-01 RX ADMIN — PROPOFOL 30 MCG/KG/MIN: 10 INJECTION, EMULSION INTRAVENOUS at 15:22

## 2020-01-01 RX ADMIN — MIDAZOLAM 1 MG: 1 INJECTION INTRAMUSCULAR; INTRAVENOUS at 07:58

## 2020-01-01 RX ADMIN — IPRATROPIUM BROMIDE AND ALBUTEROL SULFATE 1 AMPULE: .5; 3 SOLUTION RESPIRATORY (INHALATION) at 00:00

## 2020-01-01 RX ADMIN — Medication 1000 ML/HR: at 23:50

## 2020-01-01 RX ADMIN — PROPOFOL 40 MCG/KG/MIN: 10 INJECTION, EMULSION INTRAVENOUS at 08:29

## 2020-01-01 RX ADMIN — DEXMEDETOMIDINE 0.7 MCG/KG/HR: 100 INJECTION, SOLUTION, CONCENTRATE INTRAVENOUS at 14:40

## 2020-01-01 RX ADMIN — PANTOPRAZOLE SODIUM 40 MG: 40 INJECTION, POWDER, FOR SOLUTION INTRAVENOUS at 08:51

## 2020-01-01 RX ADMIN — DEXMEDETOMIDINE 0.2 MCG/KG/HR: 100 INJECTION, SOLUTION, CONCENTRATE INTRAVENOUS at 12:08

## 2020-01-01 RX ADMIN — Medication 100 MCG/HR: at 20:10

## 2020-01-01 RX ADMIN — Medication 100 MCG: at 17:21

## 2020-01-01 RX ADMIN — EPINEPHRINE 1 MCG/MIN: 1 INJECTION INTRAMUSCULAR; INTRAVENOUS; SUBCUTANEOUS at 22:08

## 2020-01-01 RX ADMIN — MEROPENEM 500 MG: 500 INJECTION, POWDER, FOR SOLUTION INTRAVENOUS at 18:37

## 2020-01-01 RX ADMIN — CHLORHEXIDINE GLUCONATE 0.12% ORAL RINSE 15 ML: 1.2 LIQUID ORAL at 19:36

## 2020-01-01 RX ADMIN — NITROGLYCERIN 70 MCG/MIN: 20 INJECTION INTRAVENOUS at 20:30

## 2020-01-01 RX ADMIN — SODIUM CHLORIDE, PRESERVATIVE FREE 10 ML: 5 INJECTION INTRAVENOUS at 08:48

## 2020-01-01 RX ADMIN — CALCIUM CHLORIDE, MAGNESIUM CHLORIDE, DEXTROSE MONOHYDRATE, LACTIC ACID, SODIUM CHLORIDE AND SODIUM BICARBONATE 2000 ML/HR: 3.68; 3.05; 22; 5.4; 6.46; 3.09 INJECTION INTRAVENOUS at 21:50

## 2020-01-01 RX ADMIN — PROPOFOL 200 MG: 10 INJECTION, EMULSION INTRAVENOUS at 11:06

## 2020-01-01 RX ADMIN — IPRATROPIUM BROMIDE AND ALBUTEROL SULFATE 1 AMPULE: .5; 3 SOLUTION RESPIRATORY (INHALATION) at 20:04

## 2020-01-01 RX ADMIN — SODIUM BICARBONATE: 84 INJECTION, SOLUTION INTRAVENOUS at 05:30

## 2020-01-01 RX ADMIN — Medication 2 MCG/MIN: at 18:12

## 2020-01-01 RX ADMIN — Medication 50 MCG/HR: at 08:56

## 2020-01-01 RX ADMIN — SODIUM BICARBONATE: 84 INJECTION, SOLUTION INTRAVENOUS at 06:49

## 2020-01-01 RX ADMIN — HEPARIN SODIUM 5000 UNITS: 5000 INJECTION INTRAVENOUS; SUBCUTANEOUS at 20:26

## 2020-01-01 RX ADMIN — PROPOFOL 40 MCG/KG/MIN: 10 INJECTION, EMULSION INTRAVENOUS at 06:07

## 2020-01-01 RX ADMIN — PROPOFOL 40 MCG/KG/MIN: 10 INJECTION, EMULSION INTRAVENOUS at 23:31

## 2020-01-01 RX ADMIN — SODIUM BICARBONATE: 84 INJECTION, SOLUTION INTRAVENOUS at 00:00

## 2020-01-01 RX ADMIN — ALBUTEROL SULFATE 2.5 MG: 2.5 SOLUTION RESPIRATORY (INHALATION) at 07:59

## 2020-01-01 RX ADMIN — PROPOFOL 5 MCG/KG/MIN: 10 INJECTION, EMULSION INTRAVENOUS at 10:15

## 2020-01-01 RX ADMIN — Medication 50 MCG/HR: at 22:07

## 2020-01-01 RX ADMIN — SODIUM BICARBONATE: 84 INJECTION, SOLUTION INTRAVENOUS at 04:25

## 2020-01-01 RX ADMIN — SODIUM CHLORIDE 10.92 UNITS/HR: 9 INJECTION, SOLUTION INTRAVENOUS at 03:11

## 2020-01-01 RX ADMIN — MIDAZOLAM 2 MG: 1 INJECTION INTRAMUSCULAR; INTRAVENOUS at 18:12

## 2020-01-01 RX ADMIN — Medication 150 MCG/HR: at 13:50

## 2020-01-01 RX ADMIN — HYDRALAZINE HYDROCHLORIDE 5 MG: 20 INJECTION INTRAMUSCULAR; INTRAVENOUS at 13:25

## 2020-01-01 RX ADMIN — SODIUM CHLORIDE 17.85 UNITS/HR: 9 INJECTION, SOLUTION INTRAVENOUS at 23:59

## 2020-01-01 RX ADMIN — PROPOFOL 30 MCG/KG/MIN: 10 INJECTION, EMULSION INTRAVENOUS at 13:17

## 2020-01-01 RX ADMIN — EPINEPHRINE 0.1 MG: 1 INJECTION, SOLUTION, CONCENTRATE INTRAVENOUS at 22:29

## 2020-01-01 RX ADMIN — Medication 150 MCG/HR: at 00:28

## 2020-01-01 RX ADMIN — Medication 1000 ML/HR: at 20:27

## 2020-01-01 RX ADMIN — HEPARIN SODIUM 5000 UNITS: 5000 INJECTION INTRAVENOUS; SUBCUTANEOUS at 09:56

## 2020-01-01 RX ADMIN — CALCIUM CHLORIDE, MAGNESIUM CHLORIDE, DEXTROSE MONOHYDRATE, LACTIC ACID, SODIUM CHLORIDE AND SODIUM BICARBONATE 2000 ML/HR: 3.68; 3.05; 22; 5.4; 6.46; 3.09 INJECTION INTRAVENOUS at 13:53

## 2020-01-01 RX ADMIN — DEXMEDETOMIDINE 0.9 MCG/KG/HR: 100 INJECTION, SOLUTION, CONCENTRATE INTRAVENOUS at 03:21

## 2020-01-01 RX ADMIN — Medication 175 MCG/HR: at 16:21

## 2020-01-01 RX ADMIN — CALCIUM GLUCONATE 1 G: 98 INJECTION, SOLUTION INTRAVENOUS at 14:35

## 2020-01-01 RX ADMIN — ERYTHROMYCIN: 5 OINTMENT OPHTHALMIC at 09:43

## 2020-01-01 RX ADMIN — CALCIUM CHLORIDE, MAGNESIUM CHLORIDE, DEXTROSE MONOHYDRATE, LACTIC ACID, SODIUM CHLORIDE, SODIUM BICARBONATE AND POTASSIUM CHLORIDE 2000 ML/HR: 5.15; 2.03; 22; 5.4; 6.46; 3.09; .157 INJECTION INTRAVENOUS at 16:44

## 2020-01-01 RX ADMIN — EPINEPHRINE 0.1 MG: 1 INJECTION, SOLUTION, CONCENTRATE INTRAVENOUS at 21:21

## 2020-01-01 RX ADMIN — CHLORHEXIDINE GLUCONATE 0.12% ORAL RINSE 15 ML: 1.2 LIQUID ORAL at 21:12

## 2020-01-01 RX ADMIN — CALCIUM CHLORIDE, MAGNESIUM CHLORIDE, DEXTROSE MONOHYDRATE, LACTIC ACID, SODIUM CHLORIDE AND SODIUM BICARBONATE 1500 ML/HR: 3.68; 3.05; 22; 5.4; 6.46; 3.09 INJECTION INTRAVENOUS at 05:55

## 2020-01-01 RX ADMIN — CALCIUM CHLORIDE, MAGNESIUM CHLORIDE, DEXTROSE MONOHYDRATE, LACTIC ACID, SODIUM CHLORIDE AND SODIUM BICARBONATE 1000 ML/HR: 3.68; 3.05; 22; 5.4; 6.46; 3.09 INJECTION INTRAVENOUS at 21:32

## 2020-01-01 RX ADMIN — CALCIUM GLUCONATE 1 G: 20 INJECTION, SOLUTION INTRAVENOUS at 12:29

## 2020-01-01 RX ADMIN — PROTAMINE SULFATE 25 MG: 10 INJECTION, SOLUTION INTRAVENOUS at 18:05

## 2020-01-01 RX ADMIN — MIDAZOLAM 1 MG: 1 INJECTION INTRAMUSCULAR; INTRAVENOUS at 23:52

## 2020-01-01 RX ADMIN — Medication 150 MCG/HR: at 23:48

## 2020-01-01 RX ADMIN — METOCLOPRAMIDE 10 MG: 5 INJECTION, SOLUTION INTRAMUSCULAR; INTRAVENOUS at 10:34

## 2020-01-01 RX ADMIN — SENNOSIDES A AND B 5 ML: 415.36 LIQUID ORAL at 20:49

## 2020-01-01 RX ADMIN — Medication 10 ML: at 08:56

## 2020-01-01 RX ADMIN — SENNOSIDES A AND B 5 ML: 415.36 LIQUID ORAL at 09:51

## 2020-01-01 RX ADMIN — HEPARIN SODIUM 5000 UNITS: 5000 INJECTION INTRAVENOUS; SUBCUTANEOUS at 20:54

## 2020-01-01 RX ADMIN — Medication 2000 ML/HR: at 16:48

## 2020-01-01 RX ADMIN — SENNOSIDES A AND B 5 ML: 415.36 LIQUID ORAL at 20:32

## 2020-01-01 RX ADMIN — DEXMEDETOMIDINE HYDROCHLORIDE 1 MCG/KG/HR: 100 INJECTION, SOLUTION INTRAVENOUS at 10:27

## 2020-01-01 RX ADMIN — Medication 10 ML: at 08:34

## 2020-01-01 RX ADMIN — MIDAZOLAM 2 MG: 1 INJECTION INTRAMUSCULAR; INTRAVENOUS at 06:37

## 2020-01-01 RX ADMIN — Medication 150 MCG/HR: at 17:10

## 2020-01-01 RX ADMIN — Medication 100 MCG/HR: at 01:14

## 2020-01-01 RX ADMIN — EPINEPHRINE 0.1 MG: 1 INJECTION, SOLUTION, CONCENTRATE INTRAVENOUS at 22:08

## 2020-01-01 RX ADMIN — Medication 10 ML: at 15:35

## 2020-01-01 RX ADMIN — HEPARIN SODIUM 5000 UNITS: 5000 INJECTION INTRAVENOUS; SUBCUTANEOUS at 21:13

## 2020-01-01 RX ADMIN — Medication 175 MCG/HR: at 15:06

## 2020-01-01 RX ADMIN — SODIUM BICARBONATE 50 MEQ: 84 INJECTION INTRAVENOUS at 21:56

## 2020-01-01 RX ADMIN — PROPOFOL 40 MCG/KG/MIN: 10 INJECTION, EMULSION INTRAVENOUS at 15:12

## 2020-01-01 RX ADMIN — VASOPRESSIN 0.04 UNITS/MIN: 20 INJECTION INTRAVENOUS at 22:06

## 2020-01-01 RX ADMIN — Medication 1500 MG: at 11:15

## 2020-01-01 RX ADMIN — FENTANYL CITRATE 25 MCG: 50 INJECTION, SOLUTION INTRAMUSCULAR; INTRAVENOUS at 00:14

## 2020-01-01 RX ADMIN — Medication: at 15:02

## 2020-01-01 RX ADMIN — SODIUM CHLORIDE, PRESERVATIVE FREE 10 ML: 5 INJECTION INTRAVENOUS at 07:41

## 2020-01-01 RX ADMIN — Medication 500 ML/HR: at 01:40

## 2020-01-01 RX ADMIN — SODIUM CHLORIDE 3.36 UNITS/HR: 9 INJECTION, SOLUTION INTRAVENOUS at 16:26

## 2020-01-01 RX ADMIN — CHLORHEXIDINE GLUCONATE 0.12% ORAL RINSE 15 ML: 1.2 LIQUID ORAL at 19:45

## 2020-01-01 RX ADMIN — VANCOMYCIN HYDROCHLORIDE 1500 MG: 10 INJECTION, POWDER, LYOPHILIZED, FOR SOLUTION INTRAVENOUS at 13:22

## 2020-01-01 RX ADMIN — EPINEPHRINE 0.1 MG: 1 INJECTION, SOLUTION, CONCENTRATE INTRAVENOUS at 21:14

## 2020-01-01 RX ADMIN — SENNOSIDES A AND B 5 ML: 415.36 LIQUID ORAL at 08:19

## 2020-01-01 RX ADMIN — IPRATROPIUM BROMIDE AND ALBUTEROL SULFATE 1 AMPULE: .5; 3 SOLUTION RESPIRATORY (INHALATION) at 20:27

## 2020-01-01 RX ADMIN — Medication 250 ML/HR: at 04:19

## 2020-01-01 RX ADMIN — SODIUM CHLORIDE, SODIUM LACTATE, POTASSIUM CHLORIDE, AND CALCIUM CHLORIDE: .6; .31; .03; .02 INJECTION, SOLUTION INTRAVENOUS at 09:09

## 2020-01-01 RX ADMIN — METOPROLOL TARTRATE 25 MG: 25 TABLET, FILM COATED ORAL at 09:04

## 2020-01-01 RX ADMIN — Medication 1000 ML/HR: at 08:15

## 2020-01-01 RX ADMIN — NICARDIPINE HYDROCHLORIDE 10 MG/HR: 0.2 INJECTION, SOLUTION INTRAVENOUS at 18:48

## 2020-01-01 RX ADMIN — CHLORHEXIDINE GLUCONATE 0.12% ORAL RINSE 15 ML: 1.2 LIQUID ORAL at 09:04

## 2020-01-01 RX ADMIN — PROPOFOL 150 MG: 10 INJECTION, EMULSION INTRAVENOUS at 10:41

## 2020-01-01 RX ADMIN — EPINEPHRINE 0.1 MG: 1 INJECTION, SOLUTION, CONCENTRATE INTRAVENOUS at 20:50

## 2020-01-01 RX ADMIN — EPINEPHRINE 1 MG: 1 INJECTION, SOLUTION, CONCENTRATE INTRAVENOUS at 19:56

## 2020-01-01 RX ADMIN — PROPOFOL 50 MCG/KG/MIN: 10 INJECTION, EMULSION INTRAVENOUS at 21:19

## 2020-01-01 RX ADMIN — DEXMEDETOMIDINE 0.5 MCG/KG/HR: 100 INJECTION, SOLUTION, CONCENTRATE INTRAVENOUS at 04:15

## 2020-01-01 RX ADMIN — IPRATROPIUM BROMIDE AND ALBUTEROL SULFATE 1 AMPULE: .5; 3 SOLUTION RESPIRATORY (INHALATION) at 00:35

## 2020-01-01 RX ADMIN — PROPOFOL 35 MCG/KG/MIN: 10 INJECTION, EMULSION INTRAVENOUS at 01:44

## 2020-01-01 RX ADMIN — CHLORHEXIDINE GLUCONATE 0.12% ORAL RINSE 15 ML: 1.2 LIQUID ORAL at 21:00

## 2020-01-01 RX ADMIN — MEROPENEM 500 MG: 500 INJECTION, POWDER, FOR SOLUTION INTRAVENOUS at 05:54

## 2020-01-01 RX ADMIN — HEPARIN SODIUM 5000 UNITS: 5000 INJECTION INTRAVENOUS; SUBCUTANEOUS at 09:04

## 2020-01-01 RX ADMIN — PROPOFOL 30 MCG/KG/MIN: 10 INJECTION, EMULSION INTRAVENOUS at 06:53

## 2020-01-01 RX ADMIN — PROPOFOL 40 MCG/KG/MIN: 10 INJECTION, EMULSION INTRAVENOUS at 18:29

## 2020-01-01 RX ADMIN — CALCIUM GLUCONATE 1 G: 20 INJECTION, SOLUTION INTRAVENOUS at 07:01

## 2020-01-01 RX ADMIN — ONDANSETRON 4 MG: 2 INJECTION INTRAMUSCULAR; INTRAVENOUS at 07:21

## 2020-01-01 RX ADMIN — PROPOFOL 30 MCG/KG/MIN: 10 INJECTION, EMULSION INTRAVENOUS at 21:50

## 2020-01-01 RX ADMIN — ASPIRIN 325 MG ORAL TABLET 325 MG: 325 PILL ORAL at 08:20

## 2020-01-01 RX ADMIN — PROPOFOL 30 MCG/KG/MIN: 10 INJECTION, EMULSION INTRAVENOUS at 03:24

## 2020-01-01 RX ADMIN — Medication 200 MG: at 09:17

## 2020-01-01 RX ADMIN — HEPARIN SODIUM 5000 UNITS: 5000 INJECTION INTRAVENOUS; SUBCUTANEOUS at 08:37

## 2020-01-01 RX ADMIN — Medication 150 MCG/HR: at 18:08

## 2020-01-01 RX ADMIN — VASOPRESSIN 0.02 UNITS/MIN: 20 INJECTION INTRAVENOUS at 00:23

## 2020-01-01 RX ADMIN — FENTANYL CITRATE 25 MCG: 50 INJECTION, SOLUTION INTRAMUSCULAR; INTRAVENOUS at 16:45

## 2020-01-01 RX ADMIN — CHLORHEXIDINE GLUCONATE 0.12% ORAL RINSE 15 ML: 1.2 LIQUID ORAL at 08:36

## 2020-01-01 RX ADMIN — ESMOLOL HYDROCHLORIDE 100 MG: 100 INJECTION, SOLUTION INTRAVENOUS at 10:41

## 2020-01-01 RX ADMIN — PROPOFOL 40 MCG/KG/MIN: 10 INJECTION, EMULSION INTRAVENOUS at 12:05

## 2020-01-01 RX ADMIN — PROPOFOL 15 MCG/KG/MIN: 10 INJECTION, EMULSION INTRAVENOUS at 01:53

## 2020-01-01 RX ADMIN — MUPIROCIN: 20 OINTMENT TOPICAL at 22:39

## 2020-01-01 RX ADMIN — Medication 2000 ML/HR: at 11:01

## 2020-01-01 RX ADMIN — Medication 1000 ML/HR: at 23:10

## 2020-01-01 RX ADMIN — SODIUM CHLORIDE: 9 INJECTION, SOLUTION INTRAVENOUS at 14:20

## 2020-01-01 RX ADMIN — EPINEPHRINE 0.1 MG: 1 INJECTION, SOLUTION, CONCENTRATE INTRAVENOUS at 21:43

## 2020-01-01 RX ADMIN — CHLORHEXIDINE GLUCONATE 0.12% ORAL RINSE 15 ML: 1.2 LIQUID ORAL at 08:20

## 2020-01-01 RX ADMIN — SODIUM BICARBONATE 50 MEQ: 84 INJECTION INTRAVENOUS at 22:09

## 2020-01-01 RX ADMIN — Medication 10 ML: at 08:37

## 2020-01-01 RX ADMIN — PROPOFOL 45 MCG/KG/MIN: 10 INJECTION, EMULSION INTRAVENOUS at 19:16

## 2020-01-01 RX ADMIN — SENNOSIDES A AND B 5 ML: 415.36 LIQUID ORAL at 20:51

## 2020-01-01 RX ADMIN — LABETALOL HYDROCHLORIDE 10 MG: 5 INJECTION INTRAVENOUS at 07:24

## 2020-01-01 RX ADMIN — MIDAZOLAM 2 MG: 1 INJECTION INTRAMUSCULAR; INTRAVENOUS at 11:16

## 2020-01-01 RX ADMIN — CALCIUM CHLORIDE, MAGNESIUM CHLORIDE, DEXTROSE MONOHYDRATE, LACTIC ACID, SODIUM CHLORIDE, SODIUM BICARBONATE AND POTASSIUM CHLORIDE 2000 ML/HR: 5.15; 2.03; 22; 5.4; 6.46; 3.09; .157 INJECTION INTRAVENOUS at 23:09

## 2020-01-01 RX ADMIN — Medication 10 ML: at 08:22

## 2020-01-01 RX ADMIN — FENTANYL CITRATE 25 MCG: 50 INJECTION, SOLUTION INTRAMUSCULAR; INTRAVENOUS at 00:35

## 2020-01-01 RX ADMIN — Medication 200 MCG/HR: at 22:43

## 2020-01-01 RX ADMIN — SODIUM CHLORIDE 2000 ML: 9 INJECTION, SOLUTION INTRAVENOUS at 20:20

## 2020-01-01 RX ADMIN — SENNOSIDES A AND B 5 ML: 415.36 LIQUID ORAL at 20:45

## 2020-01-01 RX ADMIN — IPRATROPIUM BROMIDE AND ALBUTEROL SULFATE 1 AMPULE: .5; 3 SOLUTION RESPIRATORY (INHALATION) at 08:56

## 2020-01-01 RX ADMIN — IPRATROPIUM BROMIDE AND ALBUTEROL SULFATE 1 AMPULE: .5; 3 SOLUTION RESPIRATORY (INHALATION) at 16:24

## 2020-01-01 RX ADMIN — PROPOFOL 40 MCG/KG/MIN: 10 INJECTION, EMULSION INTRAVENOUS at 11:38

## 2020-01-01 RX ADMIN — ATORVASTATIN CALCIUM 40 MG: 40 TABLET, FILM COATED ORAL at 21:22

## 2020-01-01 RX ADMIN — INSULIN HUMAN 10 UNITS: 100 INJECTION, SOLUTION PARENTERAL at 21:38

## 2020-01-01 RX ADMIN — CALCIUM CHLORIDE, MAGNESIUM CHLORIDE, DEXTROSE MONOHYDRATE, LACTIC ACID, SODIUM CHLORIDE AND SODIUM BICARBONATE 1500 ML/HR: 3.68; 3.05; 22; 5.4; 6.46; 3.09 INJECTION INTRAVENOUS at 21:32

## 2020-01-01 RX ADMIN — SODIUM BICARBONATE 50 MEQ: 84 INJECTION, SOLUTION INTRAVENOUS at 03:56

## 2020-01-01 RX ADMIN — IPRATROPIUM BROMIDE AND ALBUTEROL SULFATE 1 AMPULE: .5; 3 SOLUTION RESPIRATORY (INHALATION) at 04:58

## 2020-01-01 RX ADMIN — DEXMEDETOMIDINE 0.3 MCG/KG/HR: 100 INJECTION, SOLUTION, CONCENTRATE INTRAVENOUS at 01:52

## 2020-01-01 RX ADMIN — SENNOSIDES A AND B 5 ML: 415.36 LIQUID ORAL at 21:22

## 2020-01-01 RX ADMIN — SODIUM CHLORIDE: 9 INJECTION, SOLUTION INTRAVENOUS at 09:09

## 2020-01-01 RX ADMIN — METOPROLOL TARTRATE 25 MG: 25 TABLET, FILM COATED ORAL at 20:25

## 2020-01-01 RX ADMIN — SODIUM CHLORIDE, PRESERVATIVE FREE 10 ML: 5 INJECTION INTRAVENOUS at 10:50

## 2020-01-01 RX ADMIN — POTASSIUM CHLORIDE 20 MEQ: 29.8 INJECTION, SOLUTION INTRAVENOUS at 09:42

## 2020-01-01 RX ADMIN — HEPARIN SODIUM 5000 UNITS: 1000 INJECTION, SOLUTION INTRAVENOUS; SUBCUTANEOUS at 15:58

## 2020-01-01 RX ADMIN — SODIUM BICARBONATE: 84 INJECTION, SOLUTION INTRAVENOUS at 17:58

## 2020-01-01 RX ADMIN — CISATRACURIUM BESYLATE 14 MG: 2 INJECTION INTRAVENOUS at 14:35

## 2020-01-01 RX ADMIN — DEXMEDETOMIDINE 0.7 MCG/KG/HR: 100 INJECTION, SOLUTION, CONCENTRATE INTRAVENOUS at 10:05

## 2020-01-01 RX ADMIN — CEFAZOLIN 3 G: 1 INJECTION, POWDER, FOR SOLUTION INTRAMUSCULAR; INTRAVENOUS at 21:42

## 2020-01-01 RX ADMIN — PROPOFOL 40 MCG/KG/MIN: 10 INJECTION, EMULSION INTRAVENOUS at 02:37

## 2020-01-01 RX ADMIN — METOPROLOL TARTRATE 25 MG: 25 TABLET, FILM COATED ORAL at 19:21

## 2020-01-01 RX ADMIN — Medication 150 MCG/HR: at 19:50

## 2020-01-01 RX ADMIN — PROPOFOL 50 MCG/KG/MIN: 10 INJECTION, EMULSION INTRAVENOUS at 09:53

## 2020-01-01 RX ADMIN — MEROPENEM 500 MG: 500 INJECTION, POWDER, FOR SOLUTION INTRAVENOUS at 05:51

## 2020-01-01 RX ADMIN — NITROGLYCERIN 120 MCG/MIN: 20 INJECTION INTRAVENOUS at 10:57

## 2020-01-01 RX ADMIN — Medication 100 MEQ: at 18:18

## 2020-01-01 RX ADMIN — Medication 12.5 G: at 19:49

## 2020-01-01 RX ADMIN — MIDAZOLAM 2 MG: 1 INJECTION INTRAMUSCULAR; INTRAVENOUS at 18:35

## 2020-01-01 RX ADMIN — BISACODYL 10 MG: 10 SUPPOSITORY RECTAL at 16:26

## 2020-01-01 RX ADMIN — CALCIUM CHLORIDE 1 G: 100 INJECTION, SOLUTION INTRAVENOUS; INTRAVENTRICULAR at 14:46

## 2020-01-01 RX ADMIN — METOPROLOL TARTRATE 2.5 MG: 5 INJECTION INTRAVENOUS at 10:19

## 2020-01-01 RX ADMIN — PROPOFOL 40 MCG/KG/MIN: 10 INJECTION, EMULSION INTRAVENOUS at 20:25

## 2020-01-01 RX ADMIN — CALCIUM CHLORIDE 0.5 G: 100 INJECTION, SOLUTION INTRAVENOUS; INTRAVENTRICULAR at 21:34

## 2020-01-01 RX ADMIN — SODIUM CHLORIDE 6.4 UNITS/HR: 9 INJECTION, SOLUTION INTRAVENOUS at 18:08

## 2020-01-01 RX ADMIN — MIDAZOLAM 5 MG: 1 INJECTION INTRAMUSCULAR; INTRAVENOUS at 12:36

## 2020-01-01 RX ADMIN — EPINEPHRINE 15 MCG/MIN: 1 INJECTION INTRAMUSCULAR; INTRAVENOUS; SUBCUTANEOUS at 19:12

## 2020-01-01 RX ADMIN — PROPOFOL 40 MG: 10 INJECTION, EMULSION INTRAVENOUS at 16:20

## 2020-01-01 RX ADMIN — PANTOPRAZOLE SODIUM 40 MG: 40 INJECTION, POWDER, FOR SOLUTION INTRAVENOUS at 09:55

## 2020-01-01 RX ADMIN — Medication 200 MCG/HR: at 02:22

## 2020-01-01 RX ADMIN — HEPARIN SODIUM 5000 UNITS: 5000 INJECTION INTRAVENOUS; SUBCUTANEOUS at 20:52

## 2020-01-01 RX ADMIN — CALCIUM CHLORIDE, MAGNESIUM CHLORIDE, DEXTROSE MONOHYDRATE, LACTIC ACID, SODIUM CHLORIDE, SODIUM BICARBONATE AND POTASSIUM CHLORIDE 2000 ML/HR: 5.15; 2.03; 22; 5.4; 6.46; 3.09; .157 INJECTION INTRAVENOUS at 01:50

## 2020-01-01 RX ADMIN — MEROPENEM 500 MG: 500 INJECTION, POWDER, FOR SOLUTION INTRAVENOUS at 23:46

## 2020-01-01 RX ADMIN — SODIUM CHLORIDE: 9 INJECTION, SOLUTION INTRAVENOUS at 16:54

## 2020-01-01 RX ADMIN — IPRATROPIUM BROMIDE AND ALBUTEROL SULFATE 1 AMPULE: .5; 3 SOLUTION RESPIRATORY (INHALATION) at 05:23

## 2020-01-01 RX ADMIN — CALCIUM CHLORIDE, MAGNESIUM CHLORIDE, DEXTROSE MONOHYDRATE, LACTIC ACID, SODIUM CHLORIDE AND SODIUM BICARBONATE 1500 ML/HR: 3.68; 3.05; 22; 5.4; 6.46; 3.09 INJECTION INTRAVENOUS at 00:50

## 2020-01-01 RX ADMIN — CALCIUM GLUCONATE 1 G: 20 INJECTION, SOLUTION INTRAVENOUS at 23:46

## 2020-01-01 RX ADMIN — IPRATROPIUM BROMIDE AND ALBUTEROL SULFATE 1 AMPULE: .5; 3 SOLUTION RESPIRATORY (INHALATION) at 16:25

## 2020-01-01 RX ADMIN — CHLORHEXIDINE GLUCONATE 0.12% ORAL RINSE 15 ML: 1.2 LIQUID ORAL at 08:52

## 2020-01-01 RX ADMIN — FUROSEMIDE 10 MG/HR: 10 INJECTION, SOLUTION INTRAMUSCULAR; INTRAVENOUS at 16:19

## 2020-01-01 RX ADMIN — Medication 200 MCG/HR: at 17:34

## 2020-01-01 RX ADMIN — NICARDIPINE HYDROCHLORIDE 7.5 MG/HR: 0.1 INJECTION, SOLUTION INTRAVENOUS at 07:42

## 2020-01-01 RX ADMIN — Medication 50 MCG/HR: at 18:31

## 2020-01-01 RX ADMIN — PROPOFOL 50 MCG/KG/MIN: 10 INJECTION, EMULSION INTRAVENOUS at 02:20

## 2020-01-01 RX ADMIN — CALCIUM GLUCONATE 1 G: 98 INJECTION, SOLUTION INTRAVENOUS at 09:50

## 2020-01-01 RX ADMIN — MIDAZOLAM 2 MG: 1 INJECTION INTRAMUSCULAR; INTRAVENOUS at 18:53

## 2020-01-01 RX ADMIN — PROPOFOL 15 MCG/KG/MIN: 10 INJECTION, EMULSION INTRAVENOUS at 22:32

## 2020-01-01 RX ADMIN — Medication 1000 ML/HR: at 02:17

## 2020-01-01 RX ADMIN — EPINEPHRINE 20 MCG/MIN: 1 INJECTION INTRAMUSCULAR; INTRAVENOUS; SUBCUTANEOUS at 08:11

## 2020-01-01 RX ADMIN — CHLORHEXIDINE GLUCONATE 0.12% ORAL RINSE 15 ML: 1.2 LIQUID ORAL at 08:28

## 2020-01-01 RX ADMIN — IPRATROPIUM BROMIDE AND ALBUTEROL SULFATE 1 AMPULE: .5; 3 SOLUTION RESPIRATORY (INHALATION) at 12:08

## 2020-01-01 RX ADMIN — SENNOSIDES A AND B 5 ML: 415.36 LIQUID ORAL at 08:43

## 2020-01-01 RX ADMIN — MIDAZOLAM 2 MG/HR: 5 INJECTION INTRAMUSCULAR; INTRAVENOUS at 10:15

## 2020-01-01 RX ADMIN — SODIUM BICARBONATE: 84 INJECTION, SOLUTION INTRAVENOUS at 06:55

## 2020-01-01 RX ADMIN — CALCIUM CHLORIDE, MAGNESIUM CHLORIDE, DEXTROSE MONOHYDRATE, LACTIC ACID, SODIUM CHLORIDE, SODIUM BICARBONATE AND POTASSIUM CHLORIDE 2000 ML/HR: 5.15; 2.03; 22; 5.4; 6.46; 3.09; .157 INJECTION INTRAVENOUS at 02:42

## 2020-01-01 RX ADMIN — ROCURONIUM BROMIDE 50 MG: 10 INJECTION INTRAVENOUS at 12:36

## 2020-01-01 RX ADMIN — LABETALOL HYDROCHLORIDE 10 MG: 5 INJECTION INTRAVENOUS at 11:31

## 2020-01-01 RX ADMIN — FENTANYL CITRATE 25 MCG: 50 INJECTION, SOLUTION INTRAMUSCULAR; INTRAVENOUS at 02:46

## 2020-01-01 RX ADMIN — EPINEPHRINE 25 MCG/MIN: 1 INJECTION INTRAMUSCULAR; INTRAVENOUS; SUBCUTANEOUS at 04:50

## 2020-01-01 RX ADMIN — DEXMEDETOMIDINE 0.9 MCG/KG/HR: 100 INJECTION, SOLUTION, CONCENTRATE INTRAVENOUS at 16:53

## 2020-01-01 RX ADMIN — ERYTHROMYCIN: 5 OINTMENT OPHTHALMIC at 21:40

## 2020-01-01 RX ADMIN — SODIUM BICARBONATE: 84 INJECTION, SOLUTION INTRAVENOUS at 21:50

## 2020-01-01 RX ADMIN — ASPIRIN 325 MG ORAL TABLET 325 MG: 325 PILL ORAL at 08:37

## 2020-01-01 RX ADMIN — MIDAZOLAM 2 MG: 1 INJECTION INTRAMUSCULAR; INTRAVENOUS at 09:53

## 2020-01-01 RX ADMIN — Medication 200 MCG/HR: at 07:17

## 2020-01-01 RX ADMIN — CALCIUM GLUCONATE 1 G: 98 INJECTION, SOLUTION INTRAVENOUS at 22:08

## 2020-01-01 RX ADMIN — CALCIUM CHLORIDE, MAGNESIUM CHLORIDE, DEXTROSE MONOHYDRATE, LACTIC ACID, SODIUM CHLORIDE, SODIUM BICARBONATE AND POTASSIUM CHLORIDE 2000 ML/HR: 5.15; 2.03; 22; 5.4; 6.46; 3.09; .157 INJECTION INTRAVENOUS at 13:46

## 2020-01-01 RX ADMIN — ASPIRIN 325 MG ORAL TABLET 325 MG: 325 PILL ORAL at 09:47

## 2020-01-01 RX ADMIN — Medication 200 MCG/HR: at 09:55

## 2020-01-01 RX ADMIN — MIDAZOLAM 2 MG: 1 INJECTION INTRAMUSCULAR; INTRAVENOUS at 02:04

## 2020-01-01 RX ADMIN — MIDAZOLAM 1 MG: 1 INJECTION INTRAMUSCULAR; INTRAVENOUS at 07:29

## 2020-01-01 RX ADMIN — HEPARIN SODIUM 41000 UNITS: 1000 INJECTION, SOLUTION INTRAVENOUS; SUBCUTANEOUS at 10:49

## 2020-01-01 RX ADMIN — MIDAZOLAM 5 MG: 1 INJECTION INTRAMUSCULAR; INTRAVENOUS at 10:56

## 2020-01-01 RX ADMIN — SODIUM CHLORIDE 2000 ML: 9 INJECTION, SOLUTION INTRAVENOUS at 20:00

## 2020-01-01 RX ADMIN — MIDAZOLAM 1 MG: 1 INJECTION INTRAMUSCULAR; INTRAVENOUS at 06:39

## 2020-01-01 RX ADMIN — LIDOCAINE HYDROCHLORIDE 5 ML: 10 INJECTION, SOLUTION EPIDURAL; INFILTRATION; INTRACAUDAL; PERINEURAL at 13:10

## 2020-01-01 RX ADMIN — Medication 50 MCG/HR: at 04:23

## 2020-01-01 RX ADMIN — Medication 500 ML/HR: at 18:14

## 2020-01-01 RX ADMIN — MILRINONE LACTATE 0.38 MCG/KG/MIN: 0.2 INJECTION, SOLUTION INTRAVENOUS at 12:36

## 2020-01-01 RX ADMIN — Medication 1250 MG: at 10:55

## 2020-01-01 RX ADMIN — Medication 10 ML: at 08:26

## 2020-01-01 RX ADMIN — PANTOPRAZOLE SODIUM 40 MG: 40 INJECTION, POWDER, FOR SOLUTION INTRAVENOUS at 08:47

## 2020-01-01 RX ADMIN — PROPOFOL 10 MCG/KG/MIN: 10 INJECTION, EMULSION INTRAVENOUS at 00:22

## 2020-01-01 RX ADMIN — HEPARIN SODIUM 5000 UNITS: 5000 INJECTION INTRAVENOUS; SUBCUTANEOUS at 09:45

## 2020-01-01 RX ADMIN — VANCOMYCIN HYDROCHLORIDE 2000 MG: 1 INJECTION, POWDER, LYOPHILIZED, FOR SOLUTION INTRAVENOUS at 23:02

## 2020-01-01 RX ADMIN — MIDAZOLAM 2 MG: 1 INJECTION INTRAMUSCULAR; INTRAVENOUS at 23:16

## 2020-01-01 RX ADMIN — POTASSIUM CHLORIDE 20 MEQ: 29.8 INJECTION, SOLUTION INTRAVENOUS at 08:01

## 2020-01-01 RX ADMIN — MEROPENEM 500 MG: 500 INJECTION, POWDER, FOR SOLUTION INTRAVENOUS at 13:45

## 2020-01-01 RX ADMIN — Medication 330 ML: at 10:18

## 2020-01-01 RX ADMIN — MIDAZOLAM 2 MG: 1 INJECTION INTRAMUSCULAR; INTRAVENOUS at 15:36

## 2020-01-01 RX ADMIN — Medication 200 MCG/HR: at 04:53

## 2020-01-01 RX ADMIN — CALCIUM CHLORIDE, MAGNESIUM CHLORIDE, DEXTROSE MONOHYDRATE, LACTIC ACID, SODIUM CHLORIDE AND SODIUM BICARBONATE 2000 ML/HR: 3.68; 3.05; 22; 5.4; 6.46; 3.09 INJECTION INTRAVENOUS at 21:32

## 2020-01-01 RX ADMIN — IPRATROPIUM BROMIDE AND ALBUTEROL SULFATE 1 AMPULE: .5; 3 SOLUTION RESPIRATORY (INHALATION) at 20:33

## 2020-01-01 RX ADMIN — IPRATROPIUM BROMIDE AND ALBUTEROL SULFATE 1 AMPULE: .5; 3 SOLUTION RESPIRATORY (INHALATION) at 20:05

## 2020-01-01 RX ADMIN — Medication 200 MCG/HR: at 14:59

## 2020-01-01 RX ADMIN — Medication 150 MCG/HR: at 10:24

## 2020-01-01 RX ADMIN — Medication 1000 ML/HR: at 10:53

## 2020-01-01 RX ADMIN — ERYTHROMYCIN: 5 OINTMENT OPHTHALMIC at 21:12

## 2020-01-01 RX ADMIN — IPRATROPIUM BROMIDE AND ALBUTEROL SULFATE 1 AMPULE: .5; 3 SOLUTION RESPIRATORY (INHALATION) at 20:54

## 2020-01-01 RX ADMIN — Medication 150 MCG/HR: at 07:08

## 2020-01-01 RX ADMIN — Medication 200 MCG/HR: at 03:51

## 2020-01-01 RX ADMIN — METOPROLOL TARTRATE 25 MG: 25 TABLET, FILM COATED ORAL at 20:00

## 2020-01-01 RX ADMIN — MIDAZOLAM 1 MG: 1 INJECTION INTRAMUSCULAR; INTRAVENOUS at 05:31

## 2020-01-01 RX ADMIN — PROPOFOL 30 MCG/KG/MIN: 10 INJECTION, EMULSION INTRAVENOUS at 22:24

## 2020-01-01 RX ADMIN — CALCIUM CHLORIDE, MAGNESIUM CHLORIDE, DEXTROSE MONOHYDRATE, LACTIC ACID, SODIUM CHLORIDE, SODIUM BICARBONATE AND POTASSIUM CHLORIDE 1500 ML/HR: 5.15; 2.03; 22; 5.4; 6.46; 3.09; .157 INJECTION INTRAVENOUS at 16:44

## 2020-01-01 RX ADMIN — Medication 150 MCG/HR: at 10:19

## 2020-01-01 RX ADMIN — IPRATROPIUM BROMIDE AND ALBUTEROL SULFATE 1 AMPULE: .5; 3 SOLUTION RESPIRATORY (INHALATION) at 12:17

## 2020-01-01 RX ADMIN — MUPIROCIN: 20 OINTMENT TOPICAL at 21:12

## 2020-01-01 RX ADMIN — PROPOFOL 40 MCG/KG/MIN: 10 INJECTION, EMULSION INTRAVENOUS at 14:11

## 2020-01-01 RX ADMIN — NITROGLYCERIN 0.4 MG: 0.4 TABLET, ORALLY DISINTEGRATING SUBLINGUAL at 07:25

## 2020-01-01 RX ADMIN — CALCIUM CHLORIDE, MAGNESIUM CHLORIDE, DEXTROSE MONOHYDRATE, LACTIC ACID, SODIUM CHLORIDE AND SODIUM BICARBONATE 2000 ML/HR: 3.68; 3.05; 22; 5.4; 6.46; 3.09 INJECTION INTRAVENOUS at 16:32

## 2020-01-01 RX ADMIN — PANTOPRAZOLE SODIUM 40 MG: 40 INJECTION, POWDER, FOR SOLUTION INTRAVENOUS at 10:20

## 2020-01-01 RX ADMIN — SENNOSIDES A AND B 5 ML: 415.36 LIQUID ORAL at 08:41

## 2020-01-01 RX ADMIN — MEROPENEM 500 MG: 500 INJECTION, POWDER, FOR SOLUTION INTRAVENOUS at 13:04

## 2020-01-01 RX ADMIN — PANTOPRAZOLE SODIUM 40 MG: 40 INJECTION, POWDER, FOR SOLUTION INTRAVENOUS at 18:50

## 2020-01-01 RX ADMIN — PROPOFOL 200 MG: 10 INJECTION, EMULSION INTRAVENOUS at 11:04

## 2020-01-01 RX ADMIN — MEROPENEM 500 MG: 500 INJECTION, POWDER, FOR SOLUTION INTRAVENOUS at 12:06

## 2020-01-01 RX ADMIN — ASPIRIN 325 MG ORAL TABLET 325 MG: 325 PILL ORAL at 07:55

## 2020-01-01 RX ADMIN — Medication 400 MG: at 09:47

## 2020-01-01 RX ADMIN — CALCIUM GLUCONATE 1 G: 20 INJECTION, SOLUTION INTRAVENOUS at 06:15

## 2020-01-01 RX ADMIN — ERYTHROMYCIN: 5 OINTMENT OPHTHALMIC at 09:56

## 2020-01-01 RX ADMIN — Medication 40 MCG/HR: at 11:39

## 2020-01-01 RX ADMIN — HEPARIN SODIUM 5000 UNITS: 5000 INJECTION INTRAVENOUS; SUBCUTANEOUS at 07:55

## 2020-01-01 RX ADMIN — PROPOFOL 30 MCG/KG/MIN: 10 INJECTION, EMULSION INTRAVENOUS at 13:01

## 2020-01-01 RX ADMIN — AMIODARONE HYDROCHLORIDE 150 MG: 50 INJECTION, SOLUTION INTRAVENOUS at 21:38

## 2020-01-01 RX ADMIN — Medication 10 ML: at 18:51

## 2020-01-01 RX ADMIN — Medication 150 MCG/HR: at 22:05

## 2020-01-01 RX ADMIN — Medication 2 MCG/MIN: at 14:19

## 2020-01-01 RX ADMIN — ASPIRIN 325 MG ORAL TABLET 325 MG: 325 PILL ORAL at 08:43

## 2020-01-01 RX ADMIN — Medication 3 G: at 13:00

## 2020-01-01 RX ADMIN — CHLORHEXIDINE GLUCONATE 0.12% ORAL RINSE 15 ML: 1.2 LIQUID ORAL at 20:23

## 2020-01-01 RX ADMIN — IPRATROPIUM BROMIDE AND ALBUTEROL SULFATE 1 AMPULE: .5; 3 SOLUTION RESPIRATORY (INHALATION) at 16:02

## 2020-01-01 RX ADMIN — IPRATROPIUM BROMIDE AND ALBUTEROL SULFATE 1 AMPULE: .5; 3 SOLUTION RESPIRATORY (INHALATION) at 16:14

## 2020-01-01 RX ADMIN — HEPARIN SODIUM 5000 UNITS: 5000 INJECTION INTRAVENOUS; SUBCUTANEOUS at 00:06

## 2020-01-01 RX ADMIN — FENTANYL CITRATE 25 MCG: 50 INJECTION, SOLUTION INTRAMUSCULAR; INTRAVENOUS at 04:00

## 2020-01-01 RX ADMIN — ASPIRIN 325 MG ORAL TABLET 325 MG: 325 PILL ORAL at 08:41

## 2020-01-01 RX ADMIN — CALCIUM CHLORIDE, MAGNESIUM CHLORIDE, DEXTROSE MONOHYDRATE, LACTIC ACID, SODIUM CHLORIDE AND SODIUM BICARBONATE 2000 ML/HR: 3.68; 3.05; 22; 5.4; 6.46; 3.09 INJECTION INTRAVENOUS at 19:12

## 2020-01-01 RX ADMIN — CALCIUM CHLORIDE, MAGNESIUM CHLORIDE, DEXTROSE MONOHYDRATE, LACTIC ACID, SODIUM CHLORIDE AND SODIUM BICARBONATE 2000 ML/HR: 3.68; 3.05; 22; 5.4; 6.46; 3.09 INJECTION INTRAVENOUS at 11:20

## 2020-01-01 RX ADMIN — Medication 1000 ML/HR: at 01:40

## 2020-01-01 RX ADMIN — PANTOPRAZOLE SODIUM 40 MG: 40 INJECTION, POWDER, FOR SOLUTION INTRAVENOUS at 08:28

## 2020-01-01 RX ADMIN — DEXMEDETOMIDINE 0.7 MCG/KG/HR: 100 INJECTION, SOLUTION, CONCENTRATE INTRAVENOUS at 09:19

## 2020-01-01 RX ADMIN — Medication 150 MCG/HR: at 21:18

## 2020-01-01 RX ADMIN — PROPOFOL 50 MG: 10 INJECTION, EMULSION INTRAVENOUS at 09:17

## 2020-01-01 RX ADMIN — METOPROLOL TARTRATE 25 MG: 25 TABLET, FILM COATED ORAL at 08:41

## 2020-01-01 RX ADMIN — ERYTHROMYCIN: 5 OINTMENT OPHTHALMIC at 10:05

## 2020-01-01 RX ADMIN — METOPROLOL TARTRATE 25 MG: 25 TABLET, FILM COATED ORAL at 21:32

## 2020-01-01 RX ADMIN — SODIUM CHLORIDE 8.85 UNITS/HR: 9 INJECTION, SOLUTION INTRAVENOUS at 20:26

## 2020-01-01 RX ADMIN — Medication 1000 ML/HR: at 20:15

## 2020-01-01 RX ADMIN — HYDRALAZINE HYDROCHLORIDE 5 MG: 20 INJECTION INTRAMUSCULAR; INTRAVENOUS at 15:51

## 2020-01-01 RX ADMIN — Medication 30 MG: at 09:17

## 2020-01-01 RX ADMIN — FENTANYL CITRATE 25 MCG: 50 INJECTION, SOLUTION INTRAMUSCULAR; INTRAVENOUS at 17:30

## 2020-01-01 RX ADMIN — MIDAZOLAM 1 MG: 1 INJECTION INTRAMUSCULAR; INTRAVENOUS at 04:06

## 2020-01-01 RX ADMIN — PROTAMINE SULFATE 50 MG: 10 INJECTION, SOLUTION INTRAVENOUS at 14:46

## 2020-01-01 RX ADMIN — FENTANYL CITRATE 100 MCG: 50 INJECTION, SOLUTION INTRAMUSCULAR; INTRAVENOUS at 07:21

## 2020-01-01 RX ADMIN — SENNOSIDES AND DOCUSATE SODIUM 1 TABLET: 8.6; 5 TABLET ORAL at 20:23

## 2020-01-01 RX ADMIN — INSULIN HUMAN 10 UNITS: 100 INJECTION, SOLUTION PARENTERAL at 21:06

## 2020-01-01 RX ADMIN — CHLORHEXIDINE GLUCONATE 0.12% ORAL RINSE 15 ML: 1.2 LIQUID ORAL at 08:51

## 2020-01-01 RX ADMIN — EPINEPHRINE 0.1 MG: 1 INJECTION, SOLUTION, CONCENTRATE INTRAVENOUS at 22:04

## 2020-01-01 RX ADMIN — Medication 10 ML: at 10:15

## 2020-01-01 RX ADMIN — PROPOFOL 50 MCG/KG/MIN: 10 INJECTION, EMULSION INTRAVENOUS at 21:31

## 2020-01-01 RX ADMIN — AMIODARONE HYDROCHLORIDE 150 MG: 50 INJECTION, SOLUTION INTRAVENOUS at 21:24

## 2020-01-01 RX ADMIN — EPINEPHRINE 0.1 MG: 1 INJECTION, SOLUTION, CONCENTRATE INTRAVENOUS at 22:20

## 2020-01-01 RX ADMIN — CEFAZOLIN SODIUM 2 G: 10 INJECTION, POWDER, FOR SOLUTION INTRAVENOUS at 01:00

## 2020-01-01 RX ADMIN — SODIUM PHOSPHATE, MONOBASIC, MONOHYDRATE 6 MMOL: 276; 142 INJECTION, SOLUTION INTRAVENOUS at 15:21

## 2020-01-01 RX ADMIN — PROPOFOL 50 MCG/KG/MIN: 10 INJECTION, EMULSION INTRAVENOUS at 11:53

## 2020-01-01 RX ADMIN — CHLORHEXIDINE GLUCONATE 0.12% ORAL RINSE 15 ML: 1.2 LIQUID ORAL at 19:52

## 2020-01-01 RX ADMIN — MILRINONE LACTATE IN DEXTROSE 0.17 MCG/KG/MIN: 200 INJECTION, SOLUTION INTRAVENOUS at 18:51

## 2020-01-01 RX ADMIN — PROPOFOL 50 MCG/KG/MIN: 10 INJECTION, EMULSION INTRAVENOUS at 16:52

## 2020-01-01 RX ADMIN — SENNOSIDES A AND B 5 ML: 415.36 LIQUID ORAL at 10:22

## 2020-01-01 RX ADMIN — Medication 500 ML/HR: at 08:24

## 2020-01-01 RX ADMIN — PROPOFOL 50 MCG/KG/MIN: 10 INJECTION, EMULSION INTRAVENOUS at 08:08

## 2020-01-01 RX ADMIN — IPRATROPIUM BROMIDE AND ALBUTEROL SULFATE 1 AMPULE: .5; 3 SOLUTION RESPIRATORY (INHALATION) at 12:05

## 2020-01-01 RX ADMIN — SODIUM BICARBONATE: 84 INJECTION, SOLUTION INTRAVENOUS at 07:50

## 2020-01-01 RX ADMIN — Medication 150 MCG/HR: at 14:33

## 2020-01-01 RX ADMIN — CALCIUM GLUCONATE 1 G: 20 INJECTION, SOLUTION INTRAVENOUS at 05:54

## 2020-01-01 RX ADMIN — Medication 175 MCG/HR: at 11:53

## 2020-01-01 RX ADMIN — Medication 12.5 G: at 21:18

## 2020-01-01 RX ADMIN — MUPIROCIN: 20 OINTMENT TOPICAL at 10:05

## 2020-01-01 RX ADMIN — Medication 500 ML/HR: at 10:48

## 2020-01-01 RX ADMIN — PROPOFOL 50 MCG/KG/MIN: 10 INJECTION, EMULSION INTRAVENOUS at 09:43

## 2020-01-01 RX ADMIN — Medication 40 MCG/HR: at 12:00

## 2020-01-01 RX ADMIN — FENTANYL CITRATE 50 MCG: 50 INJECTION, SOLUTION INTRAMUSCULAR; INTRAVENOUS at 07:52

## 2020-01-01 RX ADMIN — CALCIUM CHLORIDE, MAGNESIUM CHLORIDE, DEXTROSE MONOHYDRATE, LACTIC ACID, SODIUM CHLORIDE AND SODIUM BICARBONATE 2000 ML/HR: 3.68; 3.05; 22; 5.4; 6.46; 3.09 INJECTION INTRAVENOUS at 00:11

## 2020-01-01 RX ADMIN — FENTANYL CITRATE 25 MCG: 50 INJECTION, SOLUTION INTRAMUSCULAR; INTRAVENOUS at 02:00

## 2020-01-01 RX ADMIN — Medication 175 MCG/HR: at 19:13

## 2020-01-01 RX ADMIN — CALCIUM CHLORIDE, MAGNESIUM CHLORIDE, DEXTROSE MONOHYDRATE, LACTIC ACID, SODIUM CHLORIDE AND SODIUM BICARBONATE 1500 ML/HR: 3.68; 3.05; 22; 5.4; 6.46; 3.09 INJECTION INTRAVENOUS at 15:57

## 2020-01-01 RX ADMIN — INSULIN LISPRO 4 UNITS: 100 INJECTION, SOLUTION INTRAVENOUS; SUBCUTANEOUS at 06:32

## 2020-01-01 RX ADMIN — PROPOFOL 200 MG: 10 INJECTION, EMULSION INTRAVENOUS at 10:58

## 2020-01-01 RX ADMIN — IPRATROPIUM BROMIDE AND ALBUTEROL SULFATE 1 AMPULE: .5; 3 SOLUTION RESPIRATORY (INHALATION) at 01:08

## 2020-01-01 RX ADMIN — VANCOMYCIN HYDROCHLORIDE 1250 MG: 10 INJECTION, POWDER, LYOPHILIZED, FOR SOLUTION INTRAVENOUS at 11:06

## 2020-01-01 RX ADMIN — EPINEPHRINE 0.1 MG: 1 INJECTION, SOLUTION, CONCENTRATE INTRAVENOUS at 21:17

## 2020-01-01 RX ADMIN — SODIUM BICARBONATE 50 MEQ: 84 INJECTION INTRAVENOUS at 22:05

## 2020-01-01 RX ADMIN — ALBUMIN (HUMAN) 25 G: 0.25 INJECTION, SOLUTION INTRAVENOUS at 01:10

## 2020-01-01 RX ADMIN — SODIUM PHOSPHATE, MONOBASIC, MONOHYDRATE 6 MMOL: 276; 142 INJECTION, SOLUTION INTRAVENOUS at 10:49

## 2020-01-01 RX ADMIN — DEXMEDETOMIDINE 0.7 MCG/KG/HR: 100 INJECTION, SOLUTION, CONCENTRATE INTRAVENOUS at 19:43

## 2020-01-01 RX ADMIN — IOPAMIDOL 75 ML: 755 INJECTION, SOLUTION INTRAVENOUS at 07:08

## 2020-01-01 RX ADMIN — METOPROLOL TARTRATE 25 MG: 25 TABLET, FILM COATED ORAL at 09:56

## 2020-01-01 RX ADMIN — COAGULATION FACTOR VIIA (RECOMBINANT) 2000 MCG: KIT at 14:40

## 2020-01-01 RX ADMIN — Medication 10 ML: at 19:58

## 2020-01-01 RX ADMIN — HEPARIN SODIUM 5000 UNITS: 5000 INJECTION INTRAVENOUS; SUBCUTANEOUS at 20:10

## 2020-01-01 RX ADMIN — MIDAZOLAM 2 MG: 1 INJECTION INTRAMUSCULAR; INTRAVENOUS at 17:23

## 2020-01-01 RX ADMIN — Medication 200 MCG/HR: at 20:08

## 2020-01-01 RX ADMIN — INSULIN LISPRO 4 UNITS: 100 INJECTION, SOLUTION INTRAVENOUS; SUBCUTANEOUS at 01:10

## 2020-01-01 RX ADMIN — PROPOFOL 50 MCG/KG/MIN: 10 INJECTION, EMULSION INTRAVENOUS at 19:25

## 2020-01-01 RX ADMIN — Medication 10 ML: at 10:20

## 2020-01-01 RX ADMIN — ERYTHROMYCIN: 5 OINTMENT OPHTHALMIC at 20:00

## 2020-01-01 RX ADMIN — IPRATROPIUM BROMIDE AND ALBUTEROL SULFATE 1 AMPULE: .5; 3 SOLUTION RESPIRATORY (INHALATION) at 08:31

## 2020-01-01 RX ADMIN — HEPARIN SODIUM 5000 UNITS: 5000 INJECTION INTRAVENOUS; SUBCUTANEOUS at 21:32

## 2020-01-01 RX ADMIN — METOPROLOL TARTRATE 25 MG: 25 TABLET, FILM COATED ORAL at 07:55

## 2020-01-01 RX ADMIN — Medication 150 MCG/HR: at 07:25

## 2020-01-01 RX ADMIN — MEROPENEM 500 MG: 500 INJECTION, POWDER, FOR SOLUTION INTRAVENOUS at 06:10

## 2020-01-01 RX ADMIN — SODIUM BICARBONATE 50 MEQ: 84 INJECTION INTRAVENOUS at 20:58

## 2020-01-01 RX ADMIN — CHLORHEXIDINE GLUCONATE 0.12% ORAL RINSE 15 ML: 1.2 LIQUID ORAL at 10:20

## 2020-01-01 RX ADMIN — IPRATROPIUM BROMIDE AND ALBUTEROL SULFATE 1 AMPULE: .5; 3 SOLUTION RESPIRATORY (INHALATION) at 08:09

## 2020-01-01 RX ADMIN — HEPARIN SODIUM 5000 UNITS: 5000 INJECTION INTRAVENOUS; SUBCUTANEOUS at 08:52

## 2020-01-01 RX ADMIN — EPINEPHRINE 4 MCG/MIN: 1 INJECTION PARENTERAL at 14:15

## 2020-01-01 RX ADMIN — SODIUM BICARBONATE 50 MEQ: 84 INJECTION INTRAVENOUS at 22:30

## 2020-01-01 RX ADMIN — IOPAMIDOL 75 ML: 755 INJECTION, SOLUTION INTRAVENOUS at 12:23

## 2020-01-01 RX ADMIN — PROPOFOL 40 MCG/KG/MIN: 10 INJECTION, EMULSION INTRAVENOUS at 21:10

## 2020-01-01 RX ADMIN — Medication 150 MCG/HR: at 04:07

## 2020-01-01 RX ADMIN — IPRATROPIUM BROMIDE AND ALBUTEROL SULFATE 1 AMPULE: .5; 3 SOLUTION RESPIRATORY (INHALATION) at 04:17

## 2020-01-01 RX ADMIN — MUPIROCIN: 20 OINTMENT TOPICAL at 10:20

## 2020-01-01 RX ADMIN — CHLORHEXIDINE GLUCONATE 0.12% ORAL RINSE 15 ML: 1.2 LIQUID ORAL at 19:59

## 2020-01-01 RX ADMIN — IPRATROPIUM BROMIDE AND ALBUTEROL SULFATE 1 AMPULE: .5; 3 SOLUTION RESPIRATORY (INHALATION) at 12:12

## 2020-01-01 RX ADMIN — CALCIUM CHLORIDE, MAGNESIUM CHLORIDE, DEXTROSE MONOHYDRATE, LACTIC ACID, SODIUM CHLORIDE AND SODIUM BICARBONATE 2000 ML/HR: 3.68; 3.05; 22; 5.4; 6.46; 3.09 INJECTION INTRAVENOUS at 12:27

## 2020-01-01 RX ADMIN — MEROPENEM 500 MG: 500 INJECTION, POWDER, FOR SOLUTION INTRAVENOUS at 00:20

## 2020-01-01 RX ADMIN — Medication 3 G: at 09:15

## 2020-01-01 RX ADMIN — METOPROLOL TARTRATE 12.5 MG: 25 TABLET, FILM COATED ORAL at 21:21

## 2020-01-01 RX ADMIN — MEROPENEM 500 MG: 500 INJECTION, POWDER, FOR SOLUTION INTRAVENOUS at 18:12

## 2020-01-01 RX ADMIN — CALCIUM CHLORIDE 1 G: 100 INJECTION, SOLUTION INTRAVENOUS; INTRAVENTRICULAR at 21:16

## 2020-01-01 RX ADMIN — CALCIUM GLUCONATE 1 G: 98 INJECTION, SOLUTION INTRAVENOUS at 20:28

## 2020-01-01 RX ADMIN — CHLORHEXIDINE GLUCONATE 0.12% ORAL RINSE 15 ML: 1.2 LIQUID ORAL at 20:24

## 2020-01-01 RX ADMIN — Medication 1500 ML/HR: at 10:43

## 2020-01-01 RX ADMIN — PROPOFOL 40 MCG/KG/MIN: 10 INJECTION, EMULSION INTRAVENOUS at 03:31

## 2020-01-01 RX ADMIN — FENTANYL CITRATE 25 MCG: 50 INJECTION, SOLUTION INTRAMUSCULAR; INTRAVENOUS at 07:40

## 2020-01-01 RX ADMIN — Medication 150 MCG/HR: at 11:23

## 2020-01-01 RX ADMIN — HEPARIN SODIUM 5000 UNITS: 5000 INJECTION INTRAVENOUS; SUBCUTANEOUS at 10:20

## 2020-01-01 RX ADMIN — PROPOFOL 35 MCG/KG/MIN: 10 INJECTION, EMULSION INTRAVENOUS at 05:22

## 2020-01-01 RX ADMIN — CALCIUM GLUCONATE 1 G: 20 INJECTION, SOLUTION INTRAVENOUS at 01:40

## 2020-01-01 RX ADMIN — METOPROLOL TARTRATE 25 MG: 25 TABLET, FILM COATED ORAL at 20:10

## 2020-01-01 RX ADMIN — Medication 30 MCG/MIN: at 10:05

## 2020-01-01 RX ADMIN — EPINEPHRINE 0.1 MG: 1 INJECTION, SOLUTION, CONCENTRATE INTRAVENOUS at 21:55

## 2020-01-01 RX ADMIN — SODIUM CHLORIDE 3.1 UNITS/HR: 9 INJECTION, SOLUTION INTRAVENOUS at 19:20

## 2020-01-01 RX ADMIN — FENTANYL CITRATE 25 MCG: 50 INJECTION, SOLUTION INTRAMUSCULAR; INTRAVENOUS at 23:34

## 2020-01-01 RX ADMIN — HEPARIN SODIUM 1000 UNITS: 1000 INJECTION, SOLUTION INTRAVENOUS; SUBCUTANEOUS at 16:41

## 2020-01-01 RX ADMIN — Medication 3 MCG/MIN: at 15:27

## 2020-01-01 RX ADMIN — MEROPENEM 500 MG: 500 INJECTION, POWDER, FOR SOLUTION INTRAVENOUS at 12:55

## 2020-01-01 RX ADMIN — SODIUM BICARBONATE 50 MEQ: 84 INJECTION INTRAVENOUS at 19:57

## 2020-01-01 RX ADMIN — CEFAZOLIN 3 G: 1 INJECTION, POWDER, FOR SOLUTION INTRAMUSCULAR; INTRAVENOUS at 13:09

## 2020-01-01 RX ADMIN — ACETAMINOPHEN 650 MG: 325 TABLET ORAL at 16:47

## 2020-01-01 RX ADMIN — PROPOFOL 40 MCG/KG/MIN: 10 INJECTION, EMULSION INTRAVENOUS at 00:30

## 2020-01-01 RX ADMIN — PROPOFOL 30 MCG/KG/MIN: 10 INJECTION, EMULSION INTRAVENOUS at 18:36

## 2020-01-01 RX ADMIN — CALCIUM GLUCONATE 1 G: 20 INJECTION, SOLUTION INTRAVENOUS at 01:54

## 2020-01-01 RX ADMIN — VANCOMYCIN HYDROCHLORIDE 2000 MG: 1 INJECTION, POWDER, LYOPHILIZED, FOR SOLUTION INTRAVENOUS at 09:02

## 2020-01-01 RX ADMIN — SODIUM CHLORIDE, PRESERVATIVE FREE 10 ML: 5 INJECTION INTRAVENOUS at 22:39

## 2020-01-01 RX ADMIN — HYDRALAZINE HYDROCHLORIDE 5 MG: 20 INJECTION INTRAMUSCULAR; INTRAVENOUS at 15:57

## 2020-01-01 RX ADMIN — Medication 1500 MG: at 11:56

## 2020-01-01 RX ADMIN — SODIUM BICARBONATE: 84 INJECTION, SOLUTION INTRAVENOUS at 04:06

## 2020-01-01 RX ADMIN — EPINEPHRINE 25 MCG/MIN: 1 INJECTION INTRAMUSCULAR; INTRAVENOUS; SUBCUTANEOUS at 12:16

## 2020-01-01 RX ADMIN — ALBUTEROL SULFATE 2.5 MG: 2.5 SOLUTION RESPIRATORY (INHALATION) at 20:08

## 2020-01-01 RX ADMIN — SODIUM CHLORIDE, PRESERVATIVE FREE 10 ML: 5 INJECTION INTRAVENOUS at 23:02

## 2020-01-01 RX ADMIN — Medication 10 ML: at 08:52

## 2020-01-01 RX ADMIN — CALCIUM CHLORIDE 1 G: 100 INJECTION INTRAVENOUS; INTRAVENTRICULAR at 16:38

## 2020-01-01 RX ADMIN — DEXMEDETOMIDINE 0.9 MCG/KG/HR: 100 INJECTION, SOLUTION, CONCENTRATE INTRAVENOUS at 11:06

## 2020-01-01 RX ADMIN — IPRATROPIUM BROMIDE AND ALBUTEROL SULFATE 1 AMPULE: .5; 3 SOLUTION RESPIRATORY (INHALATION) at 13:27

## 2020-01-01 RX ADMIN — PROPOFOL 45 MCG/KG/MIN: 10 INJECTION, EMULSION INTRAVENOUS at 13:26

## 2020-01-01 RX ADMIN — MIDAZOLAM 1 MG: 1 INJECTION INTRAMUSCULAR; INTRAVENOUS at 04:28

## 2020-01-01 RX ADMIN — IPRATROPIUM BROMIDE AND ALBUTEROL SULFATE 1 AMPULE: .5; 3 SOLUTION RESPIRATORY (INHALATION) at 01:20

## 2020-01-01 RX ADMIN — SUFENTANIL CITRATE 100 MCG: 50 INJECTION EPIDURAL; INTRAVENOUS at 09:17

## 2020-01-01 RX ADMIN — Medication 100 MCG/HR: at 15:12

## 2020-01-01 RX ADMIN — Medication 150 MCG/HR: at 04:46

## 2020-01-01 RX ADMIN — MIDAZOLAM 1 MG: 1 INJECTION INTRAMUSCULAR; INTRAVENOUS at 17:29

## 2020-01-01 RX ADMIN — MEROPENEM 500 MG: 500 INJECTION, POWDER, FOR SOLUTION INTRAVENOUS at 18:44

## 2020-01-01 RX ADMIN — DEXMEDETOMIDINE 0.9 MCG/KG/HR: 100 INJECTION, SOLUTION, CONCENTRATE INTRAVENOUS at 23:43

## 2020-01-01 RX ADMIN — IPRATROPIUM BROMIDE AND ALBUTEROL SULFATE 1 AMPULE: .5; 3 SOLUTION RESPIRATORY (INHALATION) at 20:09

## 2020-01-01 RX ADMIN — Medication 500 ML/HR: at 16:45

## 2020-01-01 RX ADMIN — CALCIUM CHLORIDE, MAGNESIUM CHLORIDE, DEXTROSE MONOHYDRATE, LACTIC ACID, SODIUM CHLORIDE, SODIUM BICARBONATE AND POTASSIUM CHLORIDE 2000 ML/HR: 5.15; 2.03; 22; 5.4; 6.46; 3.09; .157 INJECTION INTRAVENOUS at 07:00

## 2020-01-01 RX ADMIN — DEXTROSE MONOHYDRATE 12.5 G: 25 INJECTION, SOLUTION INTRAVENOUS at 20:18

## 2020-01-01 RX ADMIN — ALBUMIN (HUMAN) 25 G: 0.25 INJECTION, SOLUTION INTRAVENOUS at 21:08

## 2020-01-01 RX ADMIN — MIDAZOLAM 2 MG: 1 INJECTION INTRAMUSCULAR; INTRAVENOUS at 00:32

## 2020-01-01 RX ADMIN — CALCIUM GLUCONATE 1 G: 98 INJECTION, SOLUTION INTRAVENOUS at 10:47

## 2020-01-01 RX ADMIN — Medication 500 ML/HR: at 23:10

## 2020-01-01 RX ADMIN — Medication 500 ML/HR: at 10:15

## 2020-01-01 RX ADMIN — SODIUM CHLORIDE 5 MG/HR: 9 INJECTION, SOLUTION INTRAVENOUS at 05:50

## 2020-01-01 RX ADMIN — SODIUM CHLORIDE: 9 INJECTION, SOLUTION INTRAVENOUS at 16:15

## 2020-01-01 RX ADMIN — HEPARIN SODIUM 5000 UNITS: 5000 INJECTION INTRAVENOUS; SUBCUTANEOUS at 19:58

## 2020-01-01 RX ADMIN — SODIUM BICARBONATE 50 MEQ: 84 INJECTION, SOLUTION INTRAVENOUS at 06:32

## 2020-01-01 RX ADMIN — PROPOFOL 10 MCG/KG/MIN: 10 INJECTION, EMULSION INTRAVENOUS at 19:12

## 2020-01-01 RX ADMIN — Medication 10 ML: at 08:47

## 2020-01-01 RX ADMIN — NITROGLYCERIN 100 MCG: 20 INJECTION INTRAVENOUS at 18:58

## 2020-01-01 RX ADMIN — IPRATROPIUM BROMIDE AND ALBUTEROL SULFATE 1 AMPULE: .5; 3 SOLUTION RESPIRATORY (INHALATION) at 08:06

## 2020-01-01 RX ADMIN — Medication 500 ML/HR: at 20:27

## 2020-01-01 RX ADMIN — PROPOFOL 50 MCG/KG/MIN: 10 INJECTION, EMULSION INTRAVENOUS at 02:36

## 2020-01-01 RX ADMIN — AMIODARONE HYDROCHLORIDE 150 MG: 50 INJECTION, SOLUTION INTRAVENOUS at 20:14

## 2020-01-01 RX ADMIN — Medication 100 MCG/HR: at 05:03

## 2020-01-01 RX ADMIN — PROPOFOL 35 MCG/KG/MIN: 10 INJECTION, EMULSION INTRAVENOUS at 15:29

## 2020-01-01 RX ADMIN — ALBUMIN (HUMAN) 250 ML: 12.5 INJECTION, SOLUTION INTRAVENOUS at 17:25

## 2020-01-01 RX ADMIN — NITROGLYCERIN 60 MCG/MIN: 20 INJECTION INTRAVENOUS at 20:25

## 2020-01-01 RX ADMIN — PROPOFOL 50 MCG/KG/MIN: 10 INJECTION, EMULSION INTRAVENOUS at 06:58

## 2020-01-01 RX ADMIN — Medication 10 ML: at 20:26

## 2020-01-01 RX ADMIN — HEPARIN SODIUM 5000 UNITS: 5000 INJECTION INTRAVENOUS; SUBCUTANEOUS at 08:29

## 2020-01-01 RX ADMIN — PROPOFOL 40 MCG/KG/MIN: 10 INJECTION, EMULSION INTRAVENOUS at 20:00

## 2020-01-01 RX ADMIN — CALCIUM CHLORIDE, MAGNESIUM CHLORIDE, DEXTROSE MONOHYDRATE, LACTIC ACID, SODIUM CHLORIDE, SODIUM BICARBONATE AND POTASSIUM CHLORIDE 2000 ML/HR: 5.15; 2.03; 22; 5.4; 6.46; 3.09; .157 INJECTION INTRAVENOUS at 04:42

## 2020-01-01 RX ADMIN — SODIUM BICARBONATE 50 MEQ: 84 INJECTION INTRAVENOUS at 19:55

## 2020-01-01 RX ADMIN — ROCURONIUM BROMIDE 50 MG: 10 INJECTION INTRAVENOUS at 14:04

## 2020-01-01 RX ADMIN — METOPROLOL TARTRATE 12.5 MG: 25 TABLET, FILM COATED ORAL at 09:46

## 2020-01-01 RX ADMIN — HEPARIN SODIUM 5000 UNITS: 5000 INJECTION INTRAVENOUS; SUBCUTANEOUS at 09:57

## 2020-01-01 RX ADMIN — ERYTHROMYCIN: 5 OINTMENT OPHTHALMIC at 22:00

## 2020-01-01 RX ADMIN — PROPOFOL 50 MCG/KG/MIN: 10 INJECTION, EMULSION INTRAVENOUS at 04:43

## 2020-01-01 RX ADMIN — Medication 2000 ML/HR: at 08:24

## 2020-01-01 RX ADMIN — PROPOFOL 50 MCG/KG/MIN: 10 INJECTION, EMULSION INTRAVENOUS at 05:02

## 2020-01-01 RX ADMIN — CALCIUM CHLORIDE, MAGNESIUM CHLORIDE, DEXTROSE MONOHYDRATE, LACTIC ACID, SODIUM CHLORIDE AND SODIUM BICARBONATE 1500 ML/HR: 3.68; 3.05; 22; 5.4; 6.46; 3.09 INJECTION INTRAVENOUS at 04:25

## 2020-01-01 RX ADMIN — Medication 40 MCG/HR: at 01:48

## 2020-01-01 RX ADMIN — FENTANYL CITRATE 25 MCG: 50 INJECTION, SOLUTION INTRAMUSCULAR; INTRAVENOUS at 20:42

## 2020-01-01 RX ADMIN — DEXMEDETOMIDINE 0.3 MCG/KG/HR: 100 INJECTION, SOLUTION, CONCENTRATE INTRAVENOUS at 10:24

## 2020-01-01 RX ADMIN — MIDAZOLAM 2 MG: 1 INJECTION INTRAMUSCULAR; INTRAVENOUS at 00:44

## 2020-01-01 RX ADMIN — SODIUM CHLORIDE 5.12 UNITS/HR: 9 INJECTION, SOLUTION INTRAVENOUS at 22:35

## 2020-01-01 RX ADMIN — DEXMEDETOMIDINE 0.7 MCG/KG/HR: 100 INJECTION, SOLUTION, CONCENTRATE INTRAVENOUS at 00:09

## 2020-01-01 RX ADMIN — VASOPRESSIN 0.02 UNITS/MIN: 20 INJECTION INTRAVENOUS at 10:04

## 2020-01-01 RX ADMIN — IPRATROPIUM BROMIDE AND ALBUTEROL SULFATE 1 AMPULE: .5; 3 SOLUTION RESPIRATORY (INHALATION) at 12:03

## 2020-01-01 RX ADMIN — Medication 1500 ML/HR: at 07:05

## 2020-01-01 RX ADMIN — AMINOCAPROIC ACID 5000 MG: 250 INJECTION, SOLUTION INTRAVENOUS at 14:30

## 2020-01-01 RX ADMIN — ASPIRIN 325 MG ORAL TABLET 325 MG: 325 PILL ORAL at 09:04

## 2020-01-01 RX ADMIN — PANTOPRAZOLE SODIUM 40 MG: 40 INJECTION, POWDER, FOR SOLUTION INTRAVENOUS at 08:37

## 2020-01-01 RX ADMIN — ASPIRIN 325 MG ORAL TABLET 325 MG: 325 PILL ORAL at 08:52

## 2020-01-01 RX ADMIN — PROPOFOL 50 MCG/KG/MIN: 10 INJECTION, EMULSION INTRAVENOUS at 10:17

## 2020-01-01 RX ADMIN — VANCOMYCIN HYDROCHLORIDE 1250 MG: 10 INJECTION, POWDER, LYOPHILIZED, FOR SOLUTION INTRAVENOUS at 11:32

## 2020-01-01 RX ADMIN — MEROPENEM 500 MG: 500 INJECTION, POWDER, FOR SOLUTION INTRAVENOUS at 23:43

## 2020-01-01 RX ADMIN — PANTOPRAZOLE SODIUM 40 MG: 40 INJECTION, POWDER, FOR SOLUTION INTRAVENOUS at 08:46

## 2020-01-01 RX ADMIN — SODIUM BICARBONATE: 84 INJECTION, SOLUTION INTRAVENOUS at 02:02

## 2020-01-01 RX ADMIN — FUROSEMIDE 40 MG: 10 INJECTION, SOLUTION INTRAMUSCULAR; INTRAVENOUS at 15:27

## 2020-01-01 RX ADMIN — MUPIROCIN: 20 OINTMENT TOPICAL at 09:51

## 2020-01-01 RX ADMIN — DEXTROSE MONOHYDRATE 25 G: 25 INJECTION, SOLUTION INTRAVENOUS at 21:38

## 2020-01-01 RX ADMIN — MIDAZOLAM 2 MG: 1 INJECTION INTRAMUSCULAR; INTRAVENOUS at 18:30

## 2020-01-01 RX ADMIN — IPRATROPIUM BROMIDE AND ALBUTEROL SULFATE 1 AMPULE: .5; 3 SOLUTION RESPIRATORY (INHALATION) at 21:45

## 2020-01-01 RX ADMIN — Medication 150 MCG/HR: at 01:26

## 2020-01-01 RX ADMIN — SODIUM CHLORIDE 5 MG/HR: 9 INJECTION, SOLUTION INTRAVENOUS at 12:29

## 2020-01-01 RX ADMIN — HEPARIN SODIUM 2000 UNITS: 1000 INJECTION INTRAVENOUS; SUBCUTANEOUS at 11:38

## 2020-01-01 RX ADMIN — DEXMEDETOMIDINE 0.4 MCG/KG/HR: 100 INJECTION, SOLUTION, CONCENTRATE INTRAVENOUS at 22:25

## 2020-01-01 RX ADMIN — CHLORHEXIDINE GLUCONATE 0.12% ORAL RINSE 15 ML: 1.2 LIQUID ORAL at 09:47

## 2020-01-01 RX ADMIN — Medication 40 MCG/HR: at 01:13

## 2020-01-01 RX ADMIN — SODIUM BICARBONATE: 84 INJECTION, SOLUTION INTRAVENOUS at 10:55

## 2020-01-01 RX ADMIN — CHLORHEXIDINE GLUCONATE 0.12% ORAL RINSE 15 ML: 1.2 LIQUID ORAL at 22:48

## 2020-01-01 RX ADMIN — NICARDIPINE HYDROCHLORIDE 5 MG/HR: 0.2 INJECTION, SOLUTION INTRAVENOUS at 22:44

## 2020-01-01 RX ADMIN — NITROGLYCERIN 10 MCG/MIN: 20 INJECTION INTRAVENOUS at 00:04

## 2020-01-01 RX ADMIN — Medication 250 ML/HR: at 10:48

## 2020-01-01 RX ADMIN — SODIUM BICARBONATE: 84 INJECTION, SOLUTION INTRAVENOUS at 20:07

## 2020-01-01 RX ADMIN — CHLORHEXIDINE GLUCONATE 0.12% ORAL RINSE 15 ML: 1.2 LIQUID ORAL at 20:51

## 2020-01-01 RX ADMIN — FENTANYL CITRATE 25 MCG: 50 INJECTION, SOLUTION INTRAMUSCULAR; INTRAVENOUS at 19:39

## 2020-01-01 RX ADMIN — NITROGLYCERIN 200 MCG/MIN: 20 INJECTION INTRAVENOUS at 06:13

## 2020-01-01 RX ADMIN — SODIUM BICARBONATE 50 MEQ: 84 INJECTION INTRAVENOUS at 22:12

## 2020-01-01 RX ADMIN — HEPARIN SODIUM 5000 UNITS: 5000 INJECTION INTRAVENOUS; SUBCUTANEOUS at 21:22

## 2020-01-01 RX ADMIN — IPRATROPIUM BROMIDE AND ALBUTEROL SULFATE 1 AMPULE: .5; 3 SOLUTION RESPIRATORY (INHALATION) at 08:12

## 2020-01-01 RX ADMIN — PANTOPRAZOLE SODIUM 40 MG: 40 INJECTION, POWDER, FOR SOLUTION INTRAVENOUS at 10:04

## 2020-01-01 RX ADMIN — CALCIUM CHLORIDE, MAGNESIUM CHLORIDE, DEXTROSE MONOHYDRATE, LACTIC ACID, SODIUM CHLORIDE AND SODIUM BICARBONATE 1000 ML/HR: 3.68; 3.05; 22; 5.4; 6.46; 3.09 INJECTION INTRAVENOUS at 02:46

## 2020-01-01 RX ADMIN — CISATRACURIUM BESYLATE 8 MG: 2 INJECTION INTRAVENOUS at 16:24

## 2020-01-01 RX ADMIN — MUPIROCIN: 20 OINTMENT TOPICAL at 08:53

## 2020-01-01 RX ADMIN — ACETAMINOPHEN 650 MG: 325 TABLET ORAL at 15:19

## 2020-01-01 RX ADMIN — CHLORHEXIDINE GLUCONATE 0.12% ORAL RINSE 15 ML: 1.2 LIQUID ORAL at 08:55

## 2020-01-01 RX ADMIN — SODIUM BICARBONATE: 84 INJECTION, SOLUTION INTRAVENOUS at 15:30

## 2020-01-01 RX ADMIN — PROPOFOL 40 MCG/KG/MIN: 10 INJECTION, EMULSION INTRAVENOUS at 05:44

## 2020-01-01 RX ADMIN — PROPOFOL 40 MCG/KG/MIN: 10 INJECTION, EMULSION INTRAVENOUS at 08:01

## 2020-01-01 RX ADMIN — IPRATROPIUM BROMIDE AND ALBUTEROL SULFATE 1 AMPULE: .5; 3 SOLUTION RESPIRATORY (INHALATION) at 15:33

## 2020-01-01 RX ADMIN — EPINEPHRINE 0.1 MG: 1 INJECTION, SOLUTION, CONCENTRATE INTRAVENOUS at 22:11

## 2020-01-01 RX ADMIN — Medication 150 MCG/HR: at 08:03

## 2020-01-01 RX ADMIN — CALCIUM CHLORIDE, MAGNESIUM CHLORIDE, DEXTROSE MONOHYDRATE, LACTIC ACID, SODIUM CHLORIDE AND SODIUM BICARBONATE 1500 ML/HR: 3.68; 3.05; 22; 5.4; 6.46; 3.09 INJECTION INTRAVENOUS at 08:43

## 2020-01-01 RX ADMIN — DEXMEDETOMIDINE 0.7 MCG/KG/HR: 100 INJECTION, SOLUTION, CONCENTRATE INTRAVENOUS at 04:49

## 2020-01-01 RX ADMIN — SENNOSIDES A AND B 5 ML: 415.36 LIQUID ORAL at 08:36

## 2020-01-01 RX ADMIN — Medication 12.5 G: at 20:38

## 2020-01-01 RX ADMIN — SODIUM BICARBONATE 50 MEQ: 84 INJECTION INTRAVENOUS at 21:24

## 2020-01-01 RX ADMIN — PROTAMINE SULFATE 400 MG: 10 INJECTION, SOLUTION INTRAVENOUS at 14:26

## 2020-01-01 RX ADMIN — Medication 150 MCG/HR: at 09:53

## 2020-01-01 RX ADMIN — MIDAZOLAM 1 MG: 1 INJECTION INTRAMUSCULAR; INTRAVENOUS at 19:39

## 2020-01-01 RX ADMIN — ASPIRIN 325 MG ORAL TABLET 325 MG: 325 PILL ORAL at 09:57

## 2020-01-01 RX ADMIN — PROPOFOL 10 MCG/KG/MIN: 10 INJECTION, EMULSION INTRAVENOUS at 14:29

## 2020-01-01 RX ADMIN — CHLORHEXIDINE GLUCONATE 0.12% ORAL RINSE 15 ML: 1.2 LIQUID ORAL at 08:46

## 2020-01-01 RX ADMIN — Medication 12.5 G: at 20:27

## 2020-01-01 RX ADMIN — SENNOSIDES A AND B 5 ML: 415.36 LIQUID ORAL at 21:34

## 2020-01-01 RX ADMIN — MIDAZOLAM 2 MG: 1 INJECTION INTRAMUSCULAR; INTRAVENOUS at 20:53

## 2020-01-01 RX ADMIN — CALCIUM CHLORIDE, MAGNESIUM CHLORIDE, DEXTROSE MONOHYDRATE, LACTIC ACID, SODIUM CHLORIDE AND SODIUM BICARBONATE 1500 ML/HR: 3.68; 3.05; 22; 5.4; 6.46; 3.09 INJECTION INTRAVENOUS at 02:43

## 2020-01-01 RX ADMIN — CISATRACURIUM BESYLATE 4 MG: 2 INJECTION INTRAVENOUS at 15:18

## 2020-01-01 RX ADMIN — ROCURONIUM BROMIDE 100 MG: 10 INJECTION INTRAVENOUS at 10:33

## 2020-01-01 RX ADMIN — CALCIUM CHLORIDE, MAGNESIUM CHLORIDE, DEXTROSE MONOHYDRATE, LACTIC ACID, SODIUM CHLORIDE AND SODIUM BICARBONATE 2000 ML/HR: 3.68; 3.05; 22; 5.4; 6.46; 3.09 INJECTION INTRAVENOUS at 05:30

## 2020-01-01 RX ADMIN — Medication 200 MCG/HR: at 06:23

## 2020-01-01 RX ADMIN — EPINEPHRINE 0.1 MG: 1 INJECTION, SOLUTION, CONCENTRATE INTRAVENOUS at 22:16

## 2020-01-01 RX ADMIN — SENNOSIDES A AND B 5 ML: 415.36 LIQUID ORAL at 07:55

## 2020-01-01 RX ADMIN — SODIUM CHLORIDE, PRESERVATIVE FREE 10 ML: 5 INJECTION INTRAVENOUS at 21:41

## 2020-01-01 RX ADMIN — POTASSIUM CHLORIDE 20 MEQ: 29.8 INJECTION, SOLUTION INTRAVENOUS at 21:27

## 2020-01-01 RX ADMIN — PROPOFOL 50 MCG/KG/MIN: 10 INJECTION, EMULSION INTRAVENOUS at 14:24

## 2020-01-01 RX ADMIN — Medication 150 MCG/HR: at 03:49

## 2020-01-01 RX ADMIN — MIDAZOLAM 2 MG: 1 INJECTION INTRAMUSCULAR; INTRAVENOUS at 12:00

## 2020-01-01 RX ADMIN — EPINEPHRINE 0.1 MG: 1 INJECTION, SOLUTION, CONCENTRATE INTRAVENOUS at 20:43

## 2020-01-01 RX ADMIN — SODIUM CHLORIDE: 9 INJECTION, SOLUTION INTRAVENOUS at 14:32

## 2020-01-01 RX ADMIN — DEXTROSE MONOHYDRATE 50 ML/HR: 50 INJECTION, SOLUTION INTRAVENOUS at 21:30

## 2020-01-01 RX ADMIN — Medication 10 ML: at 08:46

## 2020-01-01 RX ADMIN — Medication 500 ML/HR: at 10:43

## 2020-01-01 RX ADMIN — Medication 100 MCG/HR: at 18:25

## 2020-01-01 RX ADMIN — IPRATROPIUM BROMIDE AND ALBUTEROL SULFATE 1 AMPULE: .5; 3 SOLUTION RESPIRATORY (INHALATION) at 09:12

## 2020-01-01 RX ADMIN — ACETAMINOPHEN 650 MG: 325 TABLET ORAL at 00:35

## 2020-01-01 RX ADMIN — MEROPENEM 500 MG: 500 INJECTION, POWDER, FOR SOLUTION INTRAVENOUS at 17:41

## 2020-01-01 RX ADMIN — CALCIUM GLUCONATE 1 G: 98 INJECTION, SOLUTION INTRAVENOUS at 19:59

## 2020-01-01 RX ADMIN — IPRATROPIUM BROMIDE AND ALBUTEROL SULFATE 1 AMPULE: .5; 3 SOLUTION RESPIRATORY (INHALATION) at 23:59

## 2020-01-01 RX ADMIN — NICARDIPINE HYDROCHLORIDE 5 MG/HR: 0.2 INJECTION, SOLUTION INTRAVENOUS at 11:19

## 2020-01-01 RX ADMIN — MUPIROCIN: 20 OINTMENT TOPICAL at 08:52

## 2020-01-01 RX ADMIN — PROPOFOL 30 MCG/KG/MIN: 10 INJECTION, EMULSION INTRAVENOUS at 07:36

## 2020-01-01 RX ADMIN — SODIUM BICARBONATE 50 MEQ: 84 INJECTION, SOLUTION INTRAVENOUS at 23:25

## 2020-01-01 RX ADMIN — NITROGLYCERIN 10 MCG/MIN: 20 INJECTION INTRAVENOUS at 22:22

## 2020-01-01 RX ADMIN — Medication 200 MCG/HR: at 01:18

## 2020-01-01 RX ADMIN — HEPARIN SODIUM 5000 UNITS: 1000 INJECTION, SOLUTION INTRAVENOUS; SUBCUTANEOUS at 10:00

## 2020-01-01 RX ADMIN — Medication 2000 ML/HR: at 14:00

## 2020-01-01 RX ADMIN — PROPOFOL 35 MCG/KG/MIN: 10 INJECTION, EMULSION INTRAVENOUS at 22:45

## 2020-01-01 RX ADMIN — Medication 500 ML/HR: at 11:14

## 2020-01-01 RX ADMIN — IPRATROPIUM BROMIDE AND ALBUTEROL SULFATE 1 AMPULE: .5; 3 SOLUTION RESPIRATORY (INHALATION) at 03:58

## 2020-01-01 RX ADMIN — CALCIUM CHLORIDE, MAGNESIUM CHLORIDE, DEXTROSE MONOHYDRATE, LACTIC ACID, SODIUM CHLORIDE, SODIUM BICARBONATE AND POTASSIUM CHLORIDE 2000 ML/HR: 5.15; 2.03; 22; 5.4; 6.46; 3.09; .157 INJECTION INTRAVENOUS at 05:29

## 2020-01-01 RX ADMIN — Medication 30 MCG/MIN: at 19:43

## 2020-01-01 RX ADMIN — NITROGLYCERIN 100 MCG/MIN: 20 INJECTION INTRAVENOUS at 06:49

## 2020-01-01 RX ADMIN — MIDAZOLAM 5 MG: 1 INJECTION INTRAMUSCULAR; INTRAVENOUS at 09:17

## 2020-01-01 RX ADMIN — Medication 150 MCG/HR: at 23:10

## 2020-01-01 RX ADMIN — SENNOSIDES A AND B 5 ML: 415.36 LIQUID ORAL at 15:19

## 2020-01-01 RX ADMIN — Medication 30 MCG/MIN: at 13:02

## 2020-01-01 RX ADMIN — SODIUM CHLORIDE 5 MG/HR: 9 INJECTION, SOLUTION INTRAVENOUS at 19:20

## 2020-01-01 RX ADMIN — SODIUM BICARBONATE: 84 INJECTION, SOLUTION INTRAVENOUS at 13:52

## 2020-01-01 RX ADMIN — PROPOFOL 35 MCG/KG/MIN: 10 INJECTION, EMULSION INTRAVENOUS at 23:15

## 2020-01-01 RX ADMIN — PROPOFOL 30 MCG/KG/MIN: 10 INJECTION, EMULSION INTRAVENOUS at 22:11

## 2020-01-01 RX ADMIN — IPRATROPIUM BROMIDE AND ALBUTEROL SULFATE 1 AMPULE: .5; 3 SOLUTION RESPIRATORY (INHALATION) at 04:47

## 2020-01-01 RX ADMIN — Medication 200 MCG/HR: at 12:24

## 2020-01-01 RX ADMIN — DEXMEDETOMIDINE 1 MCG/KG/HR: 100 INJECTION, SOLUTION, CONCENTRATE INTRAVENOUS at 20:13

## 2020-01-01 RX ADMIN — Medication 100 MCG/HR: at 10:30

## 2020-01-01 RX ADMIN — CALCIUM CHLORIDE 0.5 G: 100 INJECTION, SOLUTION INTRAVENOUS; INTRAVENTRICULAR at 22:14

## 2020-01-01 RX ADMIN — IOPAMIDOL 75 ML: 755 INJECTION, SOLUTION INTRAVENOUS at 17:08

## 2020-01-01 RX ADMIN — MEROPENEM 500 MG: 500 INJECTION, POWDER, FOR SOLUTION INTRAVENOUS at 00:47

## 2020-01-01 RX ADMIN — Medication 100 MCG: at 17:17

## 2020-01-01 RX ADMIN — ACETAMINOPHEN 650 MG: 325 TABLET ORAL at 00:50

## 2020-01-01 RX ADMIN — Medication 1000 ML/HR: at 10:15

## 2020-01-01 RX ADMIN — PROPOFOL 40 MCG/KG/MIN: 10 INJECTION, EMULSION INTRAVENOUS at 10:44

## 2020-01-01 RX ADMIN — SODIUM BICARBONATE 50 MEQ: 84 INJECTION INTRAVENOUS at 21:11

## 2020-01-01 RX ADMIN — EPINEPHRINE 0.1 MG: 1 INJECTION, SOLUTION, CONCENTRATE INTRAVENOUS at 20:47

## 2020-01-01 RX ADMIN — FENTANYL CITRATE 25 MCG: 50 INJECTION, SOLUTION INTRAMUSCULAR; INTRAVENOUS at 03:01

## 2020-01-01 RX ADMIN — IPRATROPIUM BROMIDE AND ALBUTEROL SULFATE 1 AMPULE: .5; 3 SOLUTION RESPIRATORY (INHALATION) at 11:34

## 2020-01-01 RX ADMIN — METOPROLOL TARTRATE 25 MG: 25 TABLET, FILM COATED ORAL at 09:57

## 2020-01-01 RX ADMIN — PROPOFOL 30 MCG/KG/MIN: 10 INJECTION, EMULSION INTRAVENOUS at 01:35

## 2020-01-01 RX ADMIN — Medication 500 ML/HR: at 21:34

## 2020-01-01 RX ADMIN — EPINEPHRINE 0.1 MG: 1 INJECTION, SOLUTION, CONCENTRATE INTRAVENOUS at 21:08

## 2020-01-01 RX ADMIN — SENNOSIDES A AND B 5 ML: 415.36 LIQUID ORAL at 21:41

## 2020-01-01 RX ADMIN — MUPIROCIN: 20 OINTMENT TOPICAL at 21:25

## 2020-01-01 RX ADMIN — PROPOFOL 45 MCG/KG/MIN: 10 INJECTION, EMULSION INTRAVENOUS at 16:22

## 2020-01-01 RX ADMIN — Medication 1000 ML/HR: at 18:15

## 2020-01-01 RX ADMIN — Medication 500 ML/HR: at 20:26

## 2020-01-01 RX ADMIN — CALCIUM CHLORIDE, MAGNESIUM CHLORIDE, DEXTROSE MONOHYDRATE, LACTIC ACID, SODIUM CHLORIDE AND SODIUM BICARBONATE 1500 ML/HR: 3.68; 3.05; 22; 5.4; 6.46; 3.09 INJECTION INTRAVENOUS at 19:12

## 2020-01-01 RX ADMIN — IPRATROPIUM BROMIDE AND ALBUTEROL SULFATE 1 AMPULE: .5; 3 SOLUTION RESPIRATORY (INHALATION) at 19:30

## 2020-01-01 RX ADMIN — PANTOPRAZOLE SODIUM 40 MG: 40 INJECTION, POWDER, FOR SOLUTION INTRAVENOUS at 08:26

## 2020-01-01 RX ADMIN — ERYTHROMYCIN: 5 OINTMENT OPHTHALMIC at 21:25

## 2020-01-01 RX ADMIN — Medication 200 MCG/HR: at 08:53

## 2020-01-01 RX ADMIN — MIDAZOLAM 2 MG: 1 INJECTION INTRAMUSCULAR; INTRAVENOUS at 07:47

## 2020-01-01 RX ADMIN — ALBUMIN (HUMAN) 500 ML: 12.5 INJECTION, SOLUTION INTRAVENOUS at 18:37

## 2020-01-01 RX ADMIN — IPRATROPIUM BROMIDE AND ALBUTEROL SULFATE 1 AMPULE: .5; 3 SOLUTION RESPIRATORY (INHALATION) at 11:59

## 2020-01-01 RX ADMIN — HEPARIN SODIUM 5000 UNITS: 5000 INJECTION INTRAVENOUS; SUBCUTANEOUS at 08:54

## 2020-01-01 RX ADMIN — HEPARIN SODIUM 5000 UNITS: 5000 INJECTION INTRAVENOUS; SUBCUTANEOUS at 08:58

## 2020-01-01 RX ADMIN — CISATRACURIUM BESYLATE 10 MG: 2 INJECTION INTRAVENOUS at 18:10

## 2020-01-01 RX ADMIN — CHLORHEXIDINE GLUCONATE 0.12% ORAL RINSE 15 ML: 1.2 LIQUID ORAL at 21:40

## 2020-01-01 RX ADMIN — IOPAMIDOL 75 ML: 755 INJECTION, SOLUTION INTRAVENOUS at 07:41

## 2020-01-01 RX ADMIN — EPINEPHRINE 15 MCG/MIN: 1 INJECTION INTRAMUSCULAR; INTRAVENOUS; SUBCUTANEOUS at 10:49

## 2020-01-01 RX ADMIN — INSULIN LISPRO 4 UNITS: 100 INJECTION, SOLUTION INTRAVENOUS; SUBCUTANEOUS at 09:08

## 2020-01-01 RX ADMIN — Medication 150 MCG/HR: at 20:40

## 2020-01-01 RX ADMIN — EPINEPHRINE 0.1 MG: 1 INJECTION, SOLUTION, CONCENTRATE INTRAVENOUS at 22:26

## 2020-01-01 RX ADMIN — ROCURONIUM BROMIDE 100 MG: 10 INJECTION INTRAVENOUS at 09:26

## 2020-01-01 RX ADMIN — SODIUM CHLORIDE 5.92 UNITS/HR: 9 INJECTION, SOLUTION INTRAVENOUS at 02:01

## 2020-01-01 RX ADMIN — SODIUM CHLORIDE 8.8 UNITS/HR: 9 INJECTION, SOLUTION INTRAVENOUS at 10:23

## 2020-01-01 RX ADMIN — MIDAZOLAM 2 MG: 1 INJECTION INTRAMUSCULAR; INTRAVENOUS at 01:02

## 2020-01-01 RX ADMIN — PROPOFOL 40 MCG/KG/MIN: 10 INJECTION, EMULSION INTRAVENOUS at 05:06

## 2020-01-01 RX ADMIN — Medication 2000 ML/HR: at 07:04

## 2020-01-01 RX ADMIN — MEROPENEM 500 MG: 500 INJECTION, POWDER, FOR SOLUTION INTRAVENOUS at 05:02

## 2020-01-01 RX ADMIN — CALCIUM CHLORIDE, MAGNESIUM CHLORIDE, DEXTROSE MONOHYDRATE, LACTIC ACID, SODIUM CHLORIDE AND SODIUM BICARBONATE 2000 ML/HR: 3.68; 3.05; 22; 5.4; 6.46; 3.09 INJECTION INTRAVENOUS at 02:50

## 2020-01-01 RX ADMIN — DEXTROSE MONOHYDRATE: 50 INJECTION, SOLUTION INTRAVENOUS at 22:04

## 2020-01-01 RX ADMIN — Medication 2000 ML/HR: at 10:47

## 2020-01-01 RX ADMIN — MIDAZOLAM 2 MG: 1 INJECTION INTRAMUSCULAR; INTRAVENOUS at 07:38

## 2020-01-01 RX ADMIN — HEPARIN SODIUM 5000 UNITS: 5000 INJECTION INTRAVENOUS; SUBCUTANEOUS at 20:00

## 2020-01-01 RX ADMIN — INSULIN LISPRO 6 UNITS: 100 INJECTION, SOLUTION INTRAVENOUS; SUBCUTANEOUS at 13:01

## 2020-01-01 RX ADMIN — ERYTHROMYCIN: 5 OINTMENT OPHTHALMIC at 08:29

## 2020-01-01 RX ADMIN — METOPROLOL TARTRATE 25 MG: 25 TABLET, FILM COATED ORAL at 20:49

## 2020-01-01 RX ADMIN — SODIUM BICARBONATE 50 MEQ: 84 INJECTION INTRAVENOUS at 20:05

## 2020-01-01 RX ADMIN — Medication 10 ML: at 09:05

## 2020-01-01 RX ADMIN — MIDAZOLAM 1 MG: 1 INJECTION INTRAMUSCULAR; INTRAVENOUS at 07:43

## 2020-01-01 RX ADMIN — METOPROLOL TARTRATE 25 MG: 25 TABLET, FILM COATED ORAL at 08:43

## 2020-01-01 RX ADMIN — DOBUTAMINE HYDROCHLORIDE 2.5 MCG/KG/MIN: 200 INJECTION INTRAVENOUS at 00:06

## 2020-01-01 RX ADMIN — Medication 100 MCG/HR: at 06:11

## 2020-01-01 RX ADMIN — PROPOFOL 50 MCG/KG/MIN: 10 INJECTION, EMULSION INTRAVENOUS at 23:56

## 2020-01-01 RX ADMIN — MEROPENEM 500 MG: 500 INJECTION, POWDER, FOR SOLUTION INTRAVENOUS at 06:30

## 2020-01-01 RX ADMIN — PROPOFOL 45 MCG/KG/MIN: 10 INJECTION, EMULSION INTRAVENOUS at 10:20

## 2020-01-01 RX ADMIN — PROPOFOL 50 MCG/KG/MIN: 10 INJECTION, EMULSION INTRAVENOUS at 23:54

## 2020-01-01 RX ADMIN — SODIUM CHLORIDE 7.68 UNITS/HR: 9 INJECTION, SOLUTION INTRAVENOUS at 14:11

## 2020-01-01 RX ADMIN — AMINOCAPROIC ACID 5000 MG: 250 INJECTION, SOLUTION INTRAVENOUS at 10:50

## 2020-01-01 RX ADMIN — CALCIUM GLUCONATE 1 G: 98 INJECTION, SOLUTION INTRAVENOUS at 18:44

## 2020-01-01 RX ADMIN — ERYTHROMYCIN: 5 OINTMENT OPHTHALMIC at 12:57

## 2020-01-01 RX ADMIN — MIDAZOLAM 1 MG: 1 INJECTION INTRAMUSCULAR; INTRAVENOUS at 20:44

## 2020-01-01 RX ADMIN — FENTANYL CITRATE 25 MCG: 50 INJECTION, SOLUTION INTRAMUSCULAR; INTRAVENOUS at 05:02

## 2020-01-01 RX ADMIN — ASPIRIN 325 MG ORAL TABLET 325 MG: 325 PILL ORAL at 09:56

## 2020-01-01 RX ADMIN — SODIUM CHLORIDE 9.3 UNITS/HR: 9 INJECTION, SOLUTION INTRAVENOUS at 09:55

## 2020-01-01 RX ADMIN — ALBUMIN (HUMAN) 25 G: 12.5 INJECTION, SOLUTION INTRAVENOUS at 02:20

## 2020-01-01 RX ADMIN — CALCIUM GLUCONATE 1 G: 20 INJECTION, SOLUTION INTRAVENOUS at 02:43

## 2020-01-01 RX ADMIN — IPRATROPIUM BROMIDE AND ALBUTEROL SULFATE 1 AMPULE: .5; 3 SOLUTION RESPIRATORY (INHALATION) at 00:13

## 2020-01-01 RX ADMIN — IPRATROPIUM BROMIDE AND ALBUTEROL SULFATE 1 AMPULE: .5; 3 SOLUTION RESPIRATORY (INHALATION) at 04:26

## 2020-01-01 RX ADMIN — SENNOSIDES A AND B 5 ML: 415.36 LIQUID ORAL at 20:00

## 2020-01-01 RX ADMIN — EPINEPHRINE 0.1 MG: 1 INJECTION, SOLUTION, CONCENTRATE INTRAVENOUS at 22:23

## 2020-01-01 RX ADMIN — DEXMEDETOMIDINE 0.9 MCG/KG/HR: 100 INJECTION, SOLUTION, CONCENTRATE INTRAVENOUS at 07:30

## 2020-01-01 RX ADMIN — CALCIUM CHLORIDE 1 G: 100 INJECTION, SOLUTION INTRAVENOUS; INTRAVENTRICULAR at 20:09

## 2020-01-01 RX ADMIN — DEXTROSE MONOHYDRATE 25 G: 25 INJECTION, SOLUTION INTRAVENOUS at 21:12

## 2020-01-01 RX ADMIN — Medication 100 MCG: at 18:15

## 2020-01-01 RX ADMIN — MIDAZOLAM 1 MG: 1 INJECTION INTRAMUSCULAR; INTRAVENOUS at 02:09

## 2020-01-01 RX ADMIN — MEROPENEM 500 MG: 500 INJECTION, POWDER, FOR SOLUTION INTRAVENOUS at 12:45

## 2020-01-01 ASSESSMENT — PULMONARY FUNCTION TESTS
PIF_VALUE: 38
PIF_VALUE: 26
PIF_VALUE: 1
PIF_VALUE: 39
PIF_VALUE: 1
PIF_VALUE: 23
PIF_VALUE: 25
PIF_VALUE: 39
PIF_VALUE: 21
PIF_VALUE: 1
PIF_VALUE: 35
PIF_VALUE: 26
PIF_VALUE: 1
PIF_VALUE: 25
PIF_VALUE: 1
PIF_VALUE: 19
PIF_VALUE: 26
PIF_VALUE: 1
PIF_VALUE: 25
PIF_VALUE: 25
PIF_VALUE: 19
PIF_VALUE: 24
PIF_VALUE: 32
PIF_VALUE: 21
PIF_VALUE: 25
PIF_VALUE: 16
PIF_VALUE: 2
PIF_VALUE: 36
PIF_VALUE: 1
PIF_VALUE: 29
PIF_VALUE: 32
PIF_VALUE: 25
PIF_VALUE: 39
PIF_VALUE: 1
PIF_VALUE: 36
PIF_VALUE: 0
PIF_VALUE: 22
PIF_VALUE: 26
PIF_VALUE: 20
PIF_VALUE: 35
PIF_VALUE: 25
PIF_VALUE: 32
PIF_VALUE: 1
PIF_VALUE: 34
PIF_VALUE: 18
PIF_VALUE: 20
PIF_VALUE: 28
PIF_VALUE: 11
PIF_VALUE: 37
PIF_VALUE: 18
PIF_VALUE: 37
PIF_VALUE: 23
PIF_VALUE: 1
PIF_VALUE: 20
PIF_VALUE: 27
PIF_VALUE: 19
PIF_VALUE: 34
PIF_VALUE: 1
PIF_VALUE: 40
PIF_VALUE: 26
PIF_VALUE: 20
PIF_VALUE: 40
PIF_VALUE: 2
PIF_VALUE: 26
PIF_VALUE: 23
PIF_VALUE: 21
PIF_VALUE: 20
PIF_VALUE: 18
PIF_VALUE: 1
PIF_VALUE: 20
PIF_VALUE: 18
PIF_VALUE: 26
PIF_VALUE: 27
PIF_VALUE: 26
PIF_VALUE: 21
PIF_VALUE: 12
PIF_VALUE: 24
PIF_VALUE: 21
PIF_VALUE: 1
PIF_VALUE: 27
PIF_VALUE: 0
PIF_VALUE: 25
PIF_VALUE: 1
PIF_VALUE: 28
PIF_VALUE: 19
PIF_VALUE: 22
PIF_VALUE: 33
PIF_VALUE: 1
PIF_VALUE: 39
PIF_VALUE: 22
PIF_VALUE: 30
PIF_VALUE: 18
PIF_VALUE: 39
PIF_VALUE: 26
PIF_VALUE: 17
PIF_VALUE: 21
PIF_VALUE: 27
PIF_VALUE: 26
PIF_VALUE: 1
PIF_VALUE: 39
PIF_VALUE: 23
PIF_VALUE: 1
PIF_VALUE: 38
PIF_VALUE: 27
PIF_VALUE: 1
PIF_VALUE: 1
PIF_VALUE: 36
PIF_VALUE: 21
PIF_VALUE: 23
PIF_VALUE: 27
PIF_VALUE: 27
PIF_VALUE: 17
PIF_VALUE: 25
PIF_VALUE: 40
PIF_VALUE: 1
PIF_VALUE: 20
PIF_VALUE: 18
PIF_VALUE: 2
PIF_VALUE: 25
PIF_VALUE: 37
PIF_VALUE: 23
PIF_VALUE: 43
PIF_VALUE: 26
PIF_VALUE: 39
PIF_VALUE: 38
PIF_VALUE: 25
PIF_VALUE: 25
PIF_VALUE: 0
PIF_VALUE: 35
PIF_VALUE: 25
PIF_VALUE: 25
PIF_VALUE: 24
PIF_VALUE: 38
PIF_VALUE: 39
PIF_VALUE: 53
PIF_VALUE: 21
PIF_VALUE: 1
PIF_VALUE: 26
PIF_VALUE: 25
PIF_VALUE: 24
PIF_VALUE: 20
PIF_VALUE: 12
PIF_VALUE: 28
PIF_VALUE: 22
PIF_VALUE: 27
PIF_VALUE: 21
PIF_VALUE: 39
PIF_VALUE: 25
PIF_VALUE: 1
PIF_VALUE: 29
PIF_VALUE: 23
PIF_VALUE: 1
PIF_VALUE: 27
PIF_VALUE: 26
PIF_VALUE: 38
PIF_VALUE: 24
PIF_VALUE: 21
PIF_VALUE: 27
PIF_VALUE: 17
PIF_VALUE: 1
PIF_VALUE: 25
PIF_VALUE: 20
PIF_VALUE: 39
PIF_VALUE: 26
PIF_VALUE: 19
PIF_VALUE: 17
PIF_VALUE: 16
PIF_VALUE: 21
PIF_VALUE: 19
PIF_VALUE: 1
PIF_VALUE: 1
PIF_VALUE: 0
PIF_VALUE: 19
PIF_VALUE: 20
PIF_VALUE: 18
PIF_VALUE: 1
PIF_VALUE: 20
PIF_VALUE: 23
PIF_VALUE: 20
PIF_VALUE: 20
PIF_VALUE: 25
PIF_VALUE: 21
PIF_VALUE: 26
PIF_VALUE: 19
PIF_VALUE: 39
PIF_VALUE: 1
PIF_VALUE: 28
PIF_VALUE: 26
PIF_VALUE: 11
PIF_VALUE: 2
PIF_VALUE: 21
PIF_VALUE: 25
PIF_VALUE: 18
PIF_VALUE: 24
PIF_VALUE: 33
PIF_VALUE: 22
PIF_VALUE: 25
PIF_VALUE: 30
PIF_VALUE: 27
PIF_VALUE: 26
PIF_VALUE: 32
PIF_VALUE: 22
PIF_VALUE: 12
PIF_VALUE: 35
PIF_VALUE: 38
PIF_VALUE: 1
PIF_VALUE: 25
PIF_VALUE: 24
PIF_VALUE: 37
PIF_VALUE: 24
PIF_VALUE: 32
PIF_VALUE: 35
PIF_VALUE: 22
PIF_VALUE: 41
PIF_VALUE: 3
PIF_VALUE: 24
PIF_VALUE: 22
PIF_VALUE: 38
PIF_VALUE: 19
PIF_VALUE: 17
PIF_VALUE: 26
PIF_VALUE: 42
PIF_VALUE: 2
PIF_VALUE: 36
PIF_VALUE: 26
PIF_VALUE: 21
PIF_VALUE: 29
PIF_VALUE: 26
PIF_VALUE: 34
PIF_VALUE: 21
PIF_VALUE: 27
PIF_VALUE: 1
PIF_VALUE: 21
PIF_VALUE: 21
PIF_VALUE: 19
PIF_VALUE: 27
PIF_VALUE: 20
PIF_VALUE: 1
PIF_VALUE: 39
PIF_VALUE: 26
PIF_VALUE: 36
PIF_VALUE: 37
PIF_VALUE: 21
PIF_VALUE: 37
PIF_VALUE: 38
PIF_VALUE: 22
PIF_VALUE: 1
PIF_VALUE: 26
PIF_VALUE: 20
PIF_VALUE: 32
PIF_VALUE: 25
PIF_VALUE: 26
PIF_VALUE: 19
PIF_VALUE: 38
PIF_VALUE: 20
PIF_VALUE: 20
PIF_VALUE: 26
PIF_VALUE: 26
PIF_VALUE: 12
PIF_VALUE: 21
PIF_VALUE: 1
PIF_VALUE: 23
PIF_VALUE: 21
PIF_VALUE: 39
PIF_VALUE: 32
PIF_VALUE: 40
PIF_VALUE: 21
PIF_VALUE: 25
PIF_VALUE: 20
PIF_VALUE: 38
PIF_VALUE: 23
PIF_VALUE: 23
PIF_VALUE: 20
PIF_VALUE: 39
PIF_VALUE: 16
PIF_VALUE: 39
PIF_VALUE: 25
PIF_VALUE: 27
PIF_VALUE: 2
PIF_VALUE: 27
PIF_VALUE: 3
PIF_VALUE: 25
PIF_VALUE: 1
PIF_VALUE: 19
PIF_VALUE: 33
PIF_VALUE: 2
PIF_VALUE: 33
PIF_VALUE: 21
PIF_VALUE: 26
PIF_VALUE: 21
PIF_VALUE: 23
PIF_VALUE: 24
PIF_VALUE: 22
PIF_VALUE: 39
PIF_VALUE: 34
PIF_VALUE: 22
PIF_VALUE: 36
PIF_VALUE: 35
PIF_VALUE: 18
PIF_VALUE: 26
PIF_VALUE: 19
PIF_VALUE: 20
PIF_VALUE: 23
PIF_VALUE: 22
PIF_VALUE: 42
PIF_VALUE: 26
PIF_VALUE: 20
PIF_VALUE: 26
PIF_VALUE: 19
PIF_VALUE: 35
PIF_VALUE: 39
PIF_VALUE: 1
PIF_VALUE: 27
PIF_VALUE: 32
PIF_VALUE: 40
PIF_VALUE: 1
PIF_VALUE: 38
PIF_VALUE: 33
PIF_VALUE: 27
PIF_VALUE: 27
PIF_VALUE: 22
PIF_VALUE: 21
PIF_VALUE: 32
PIF_VALUE: 23
PIF_VALUE: 24
PIF_VALUE: 19
PIF_VALUE: 19
PIF_VALUE: 38
PIF_VALUE: 22
PIF_VALUE: 1
PIF_VALUE: 24
PIF_VALUE: 26
PIF_VALUE: 18
PIF_VALUE: 14
PIF_VALUE: 0
PIF_VALUE: 38
PIF_VALUE: 26
PIF_VALUE: 24
PIF_VALUE: 38
PIF_VALUE: 38
PIF_VALUE: 25
PIF_VALUE: 25
PIF_VALUE: 1
PIF_VALUE: 19
PIF_VALUE: 19
PIF_VALUE: 41
PIF_VALUE: 21
PIF_VALUE: 39
PIF_VALUE: 38
PIF_VALUE: 19
PIF_VALUE: 35
PIF_VALUE: 25
PIF_VALUE: 24
PIF_VALUE: 25
PIF_VALUE: 27
PIF_VALUE: 21
PIF_VALUE: 19
PIF_VALUE: 4
PIF_VALUE: 29
PIF_VALUE: 24
PIF_VALUE: 26
PIF_VALUE: 19
PIF_VALUE: 11
PIF_VALUE: 26
PIF_VALUE: 27
PIF_VALUE: 27
PIF_VALUE: 0
PIF_VALUE: 22
PIF_VALUE: 27
PIF_VALUE: 26
PIF_VALUE: 26
PIF_VALUE: 30
PIF_VALUE: 19
PIF_VALUE: 25
PIF_VALUE: 39
PIF_VALUE: 17
PIF_VALUE: 31
PIF_VALUE: 28
PIF_VALUE: 26
PIF_VALUE: 22
PIF_VALUE: 24
PIF_VALUE: 25
PIF_VALUE: 26
PIF_VALUE: 1
PIF_VALUE: 26
PIF_VALUE: 25
PIF_VALUE: 24
PIF_VALUE: 24
PIF_VALUE: 22
PIF_VALUE: 39
PIF_VALUE: 60
PIF_VALUE: 36
PIF_VALUE: 26
PIF_VALUE: 39
PIF_VALUE: 38
PIF_VALUE: 32
PIF_VALUE: 25
PIF_VALUE: 1
PIF_VALUE: 25
PIF_VALUE: 22
PIF_VALUE: 24
PIF_VALUE: 26
PIF_VALUE: 32
PIF_VALUE: 30
PIF_VALUE: 27
PIF_VALUE: 25
PIF_VALUE: 27
PIF_VALUE: 27
PIF_VALUE: 1
PIF_VALUE: 27
PIF_VALUE: 27
PIF_VALUE: 1
PIF_VALUE: 1
PIF_VALUE: 3
PIF_VALUE: 31
PIF_VALUE: 26
PIF_VALUE: 29
PIF_VALUE: 28
PIF_VALUE: 0
PIF_VALUE: 26
PIF_VALUE: 33
PIF_VALUE: 28
PIF_VALUE: 1
PIF_VALUE: 1
PIF_VALUE: 3
PIF_VALUE: 21
PIF_VALUE: 27
PIF_VALUE: 37
PIF_VALUE: 1
PIF_VALUE: 20
PIF_VALUE: 1
PIF_VALUE: 18
PIF_VALUE: 25
PIF_VALUE: 22
PIF_VALUE: 1
PIF_VALUE: 26
PIF_VALUE: 35
PIF_VALUE: 11
PIF_VALUE: 27
PIF_VALUE: 19
PIF_VALUE: 29
PIF_VALUE: 20
PIF_VALUE: 39
PIF_VALUE: 25
PIF_VALUE: 27
PIF_VALUE: 1
PIF_VALUE: 26
PIF_VALUE: 38
PIF_VALUE: 35
PIF_VALUE: 1
PIF_VALUE: 24
PIF_VALUE: 33
PIF_VALUE: 3
PIF_VALUE: 25
PIF_VALUE: 27
PIF_VALUE: 25
PIF_VALUE: 37
PIF_VALUE: 39
PIF_VALUE: 36
PIF_VALUE: 21
PIF_VALUE: 26
PIF_VALUE: 26
PIF_VALUE: 25
PIF_VALUE: 25
PIF_VALUE: 40
PIF_VALUE: 29
PIF_VALUE: 35
PIF_VALUE: 20
PIF_VALUE: 37
PIF_VALUE: 22
PIF_VALUE: 21
PIF_VALUE: 38
PIF_VALUE: 6
PIF_VALUE: 35
PIF_VALUE: 28
PIF_VALUE: 35
PIF_VALUE: 40
PIF_VALUE: 30
PIF_VALUE: 18
PIF_VALUE: 39
PIF_VALUE: 33
PIF_VALUE: 33
PIF_VALUE: 21
PIF_VALUE: 19
PIF_VALUE: 40
PIF_VALUE: 18
PIF_VALUE: 25
PIF_VALUE: 25
PIF_VALUE: 36
PIF_VALUE: 2
PIF_VALUE: 20
PIF_VALUE: 1
PIF_VALUE: 28
PIF_VALUE: 33
PIF_VALUE: 19
PIF_VALUE: 1
PIF_VALUE: 40
PIF_VALUE: 38
PIF_VALUE: 23
PIF_VALUE: 26
PIF_VALUE: 26
PIF_VALUE: 1
PIF_VALUE: 38
PIF_VALUE: 1
PIF_VALUE: 26
PIF_VALUE: 19
PIF_VALUE: 32
PIF_VALUE: 26
PIF_VALUE: 23
PIF_VALUE: 26
PIF_VALUE: 1
PIF_VALUE: 27
PIF_VALUE: 38
PIF_VALUE: 18
PIF_VALUE: 29
PIF_VALUE: 24
PIF_VALUE: 20
PIF_VALUE: 20
PIF_VALUE: 19
PIF_VALUE: 35
PIF_VALUE: 1
PIF_VALUE: 39
PIF_VALUE: 20
PIF_VALUE: 26
PIF_VALUE: 27
PIF_VALUE: 40
PIF_VALUE: 19
PIF_VALUE: 26
PIF_VALUE: 27
PIF_VALUE: 1
PIF_VALUE: 1
PIF_VALUE: 20
PIF_VALUE: 23
PIF_VALUE: 27
PIF_VALUE: 0
PIF_VALUE: 26
PIF_VALUE: 38
PIF_VALUE: 19
PIF_VALUE: 26
PIF_VALUE: 2
PIF_VALUE: 25
PIF_VALUE: 27
PIF_VALUE: 39
PIF_VALUE: 21
PIF_VALUE: 41
PIF_VALUE: 38
PIF_VALUE: 1
PIF_VALUE: 17
PIF_VALUE: 1
PIF_VALUE: 18
PIF_VALUE: 23
PIF_VALUE: 35
PIF_VALUE: 20
PIF_VALUE: 39
PIF_VALUE: 1
PIF_VALUE: 38
PIF_VALUE: 1
PIF_VALUE: 1
PIF_VALUE: 19
PIF_VALUE: 27
PIF_VALUE: 26
PIF_VALUE: 11
PIF_VALUE: 18
PIF_VALUE: 21
PIF_VALUE: 32
PIF_VALUE: 34
PIF_VALUE: 25
PIF_VALUE: 19
PIF_VALUE: 27
PIF_VALUE: 39
PIF_VALUE: 23
PIF_VALUE: 1
PIF_VALUE: 40
PIF_VALUE: 25
PIF_VALUE: 1
PIF_VALUE: 28
PIF_VALUE: 1
PIF_VALUE: 17
PIF_VALUE: 39
PIF_VALUE: 26
PIF_VALUE: 1
PIF_VALUE: 22
PIF_VALUE: 20
PIF_VALUE: 1
PIF_VALUE: 18
PIF_VALUE: 39
PIF_VALUE: 23
PIF_VALUE: 34
PIF_VALUE: 40
PIF_VALUE: 19
PIF_VALUE: 27
PIF_VALUE: 25
PIF_VALUE: 25
PIF_VALUE: 26
PIF_VALUE: 24
PIF_VALUE: 40
PIF_VALUE: 26
PIF_VALUE: 23
PIF_VALUE: 27
PIF_VALUE: 26
PIF_VALUE: 18
PIF_VALUE: 27
PIF_VALUE: 26
PIF_VALUE: 26
PIF_VALUE: 19
PIF_VALUE: 38
PIF_VALUE: 36
PIF_VALUE: 1
PIF_VALUE: 32
PIF_VALUE: 17
PIF_VALUE: 35
PIF_VALUE: 20
PIF_VALUE: 39
PIF_VALUE: 35
PIF_VALUE: 25
PIF_VALUE: 36
PIF_VALUE: 28
PIF_VALUE: 25
PIF_VALUE: 1
PIF_VALUE: 1
PIF_VALUE: 20
PIF_VALUE: 10
PIF_VALUE: 32
PIF_VALUE: 1
PIF_VALUE: 24
PIF_VALUE: 19
PIF_VALUE: 24
PIF_VALUE: 17
PIF_VALUE: 26
PIF_VALUE: 38
PIF_VALUE: 33
PIF_VALUE: 41
PIF_VALUE: 38
PIF_VALUE: 24
PIF_VALUE: 23
PIF_VALUE: 25
PIF_VALUE: 28
PIF_VALUE: 26
PIF_VALUE: 3
PIF_VALUE: 25
PIF_VALUE: 40
PIF_VALUE: 19
PIF_VALUE: 25
PIF_VALUE: 19
PIF_VALUE: 21
PIF_VALUE: 19
PIF_VALUE: 38
PIF_VALUE: 25
PIF_VALUE: 1
PIF_VALUE: 30
PIF_VALUE: 34
PIF_VALUE: 25
PIF_VALUE: 27
PIF_VALUE: 21
PIF_VALUE: 1
PIF_VALUE: 25
PIF_VALUE: 38
PIF_VALUE: 25
PIF_VALUE: 1
PIF_VALUE: 20
PIF_VALUE: 1
PIF_VALUE: 22
PIF_VALUE: 24
PIF_VALUE: 25
PIF_VALUE: 26
PIF_VALUE: 25
PIF_VALUE: 1
PIF_VALUE: 38
PIF_VALUE: 40
PIF_VALUE: 38
PIF_VALUE: 25
PIF_VALUE: 21
PIF_VALUE: 38
PIF_VALUE: 34
PIF_VALUE: 27
PIF_VALUE: 24
PIF_VALUE: 18
PIF_VALUE: 39
PIF_VALUE: 38
PIF_VALUE: 37
PIF_VALUE: 37
PIF_VALUE: 1
PIF_VALUE: 19
PIF_VALUE: 38
PIF_VALUE: 27
PIF_VALUE: 3
PIF_VALUE: 26
PIF_VALUE: 39
PIF_VALUE: 27
PIF_VALUE: 26
PIF_VALUE: 16
PIF_VALUE: 1
PIF_VALUE: 20
PIF_VALUE: 33
PIF_VALUE: 33
PIF_VALUE: 28
PIF_VALUE: 22
PIF_VALUE: 1
PIF_VALUE: 23
PIF_VALUE: 1
PIF_VALUE: 19
PIF_VALUE: 26
PIF_VALUE: 41
PIF_VALUE: 2
PIF_VALUE: 20
PIF_VALUE: 26
PIF_VALUE: 19
PIF_VALUE: 20
PIF_VALUE: 1
PIF_VALUE: 24
PIF_VALUE: 0
PIF_VALUE: 25
PIF_VALUE: 18
PIF_VALUE: 31
PIF_VALUE: 25
PIF_VALUE: 0
PIF_VALUE: 18
PIF_VALUE: 19
PIF_VALUE: 22
PIF_VALUE: 26
PIF_VALUE: 35
PIF_VALUE: 24
PIF_VALUE: 32
PIF_VALUE: 39
PIF_VALUE: 25
PIF_VALUE: 21
PIF_VALUE: 16
PIF_VALUE: 19
PIF_VALUE: 20
PIF_VALUE: 17
PIF_VALUE: 20
PIF_VALUE: 1
PIF_VALUE: 28
PIF_VALUE: 20
PIF_VALUE: 24
PIF_VALUE: 21
PIF_VALUE: 17
PIF_VALUE: 24
PIF_VALUE: 45
PIF_VALUE: 32
PIF_VALUE: 20
PIF_VALUE: 28
PIF_VALUE: 17
PIF_VALUE: 25
PIF_VALUE: 28
PIF_VALUE: 1
PIF_VALUE: 40
PIF_VALUE: 27
PIF_VALUE: 32
PIF_VALUE: 19
PIF_VALUE: 17
PIF_VALUE: 27
PIF_VALUE: 21
PIF_VALUE: 1
PIF_VALUE: 32
PIF_VALUE: 16
PIF_VALUE: 25
PIF_VALUE: 19
PIF_VALUE: 25
PIF_VALUE: 1
PIF_VALUE: 27
PIF_VALUE: 31
PIF_VALUE: 24
PIF_VALUE: 29
PIF_VALUE: 17
PIF_VALUE: 27
PIF_VALUE: 20
PIF_VALUE: 21
PIF_VALUE: 27
PIF_VALUE: 32
PIF_VALUE: 26
PIF_VALUE: 25
PIF_VALUE: 29
PIF_VALUE: 25
PIF_VALUE: 28
PIF_VALUE: 27
PIF_VALUE: 34
PIF_VALUE: 20
PIF_VALUE: 34
PIF_VALUE: 26
PIF_VALUE: 24
PIF_VALUE: 26
PIF_VALUE: 41
PIF_VALUE: 39
PIF_VALUE: 2
PIF_VALUE: 24
PIF_VALUE: 37
PIF_VALUE: 20
PIF_VALUE: 21
PIF_VALUE: 41
PIF_VALUE: 24
PIF_VALUE: 24
PIF_VALUE: 26
PIF_VALUE: 21
PIF_VALUE: 28
PIF_VALUE: 38
PIF_VALUE: 32
PIF_VALUE: 1
PIF_VALUE: 2
PIF_VALUE: 20
PIF_VALUE: 26
PIF_VALUE: 29
PIF_VALUE: 23
PIF_VALUE: 24
PIF_VALUE: 26
PIF_VALUE: 24
PIF_VALUE: 28
PIF_VALUE: 27
PIF_VALUE: 21
PIF_VALUE: 26
PIF_VALUE: 1
PIF_VALUE: 24
PIF_VALUE: 23
PIF_VALUE: 36
PIF_VALUE: 39
PIF_VALUE: 27
PIF_VALUE: 27
PIF_VALUE: 28
PIF_VALUE: 21
PIF_VALUE: 1
PIF_VALUE: 21
PIF_VALUE: 25
PIF_VALUE: 28
PIF_VALUE: 39
PIF_VALUE: 25
PIF_VALUE: 1
PIF_VALUE: 25
PIF_VALUE: 35
PIF_VALUE: 33
PIF_VALUE: 17
PIF_VALUE: 32
PIF_VALUE: 39
PIF_VALUE: 24
PIF_VALUE: 36
PIF_VALUE: 27
PIF_VALUE: 20
PIF_VALUE: 23
PIF_VALUE: 20
PIF_VALUE: 39
PIF_VALUE: 21
PIF_VALUE: 2
PIF_VALUE: 27
PIF_VALUE: 25
PIF_VALUE: 36
PIF_VALUE: 21
PIF_VALUE: 20
PIF_VALUE: 27
PIF_VALUE: 25
PIF_VALUE: 21
PIF_VALUE: 18
PIF_VALUE: 12
PIF_VALUE: 27
PIF_VALUE: 17
PIF_VALUE: 1
PIF_VALUE: 21
PIF_VALUE: 1
PIF_VALUE: 31
PIF_VALUE: 1
PIF_VALUE: 17
PIF_VALUE: 35
PIF_VALUE: 39
PIF_VALUE: 30
PIF_VALUE: 35
PIF_VALUE: 39
PIF_VALUE: 26
PIF_VALUE: 34
PIF_VALUE: 37
PIF_VALUE: 29
PIF_VALUE: 33
PIF_VALUE: 32
PIF_VALUE: 9
PIF_VALUE: 25
PIF_VALUE: 39
PIF_VALUE: 26
PIF_VALUE: 35
PIF_VALUE: 20
PIF_VALUE: 2
PIF_VALUE: 21
PIF_VALUE: 18
PIF_VALUE: 39
PIF_VALUE: 41
PIF_VALUE: 25
PIF_VALUE: 27
PIF_VALUE: 21
PIF_VALUE: 25
PIF_VALUE: 26
PIF_VALUE: 39
PIF_VALUE: 20
PIF_VALUE: 36
PIF_VALUE: 25
PIF_VALUE: 21
PIF_VALUE: 18
PIF_VALUE: 28
PIF_VALUE: 24
PIF_VALUE: 37
PIF_VALUE: 26
PIF_VALUE: 38
PIF_VALUE: 21
PIF_VALUE: 25
PIF_VALUE: 16
PIF_VALUE: 33
PIF_VALUE: 27
PIF_VALUE: 35
PIF_VALUE: 38
PIF_VALUE: 18
PIF_VALUE: 33
PIF_VALUE: 27
PIF_VALUE: 40
PIF_VALUE: 20
PIF_VALUE: 27
PIF_VALUE: 27
PIF_VALUE: 26
PIF_VALUE: 39
PIF_VALUE: 27
PIF_VALUE: 21
PIF_VALUE: 3
PIF_VALUE: 23
PIF_VALUE: 25
PIF_VALUE: 38
PIF_VALUE: 21
PIF_VALUE: 25
PIF_VALUE: 27
PIF_VALUE: 1
PIF_VALUE: 17
PIF_VALUE: 39
PIF_VALUE: 1
PIF_VALUE: 25
PIF_VALUE: 27
PIF_VALUE: 17
PIF_VALUE: 1
PIF_VALUE: 21
PIF_VALUE: 1
PIF_VALUE: 25
PIF_VALUE: 37
PIF_VALUE: 18
PIF_VALUE: 27
PIF_VALUE: 20
PIF_VALUE: 28
PIF_VALUE: 33
PIF_VALUE: 1
PIF_VALUE: 29
PIF_VALUE: 19
PIF_VALUE: 19
PIF_VALUE: 29
PIF_VALUE: 19
PIF_VALUE: 26
PIF_VALUE: 29
PIF_VALUE: 18
PIF_VALUE: 35
PIF_VALUE: 38
PIF_VALUE: 38
PIF_VALUE: 12
PIF_VALUE: 24
PIF_VALUE: 25
PIF_VALUE: 29
PIF_VALUE: 30
PIF_VALUE: 1
PIF_VALUE: 32
PIF_VALUE: 1
PIF_VALUE: 27
PIF_VALUE: 21
PIF_VALUE: 2
PIF_VALUE: 1
PIF_VALUE: 24
PIF_VALUE: 1
PIF_VALUE: 20
PIF_VALUE: 25
PIF_VALUE: 3
PIF_VALUE: 18
PIF_VALUE: 17
PIF_VALUE: 27
PIF_VALUE: 23
PIF_VALUE: 20
PIF_VALUE: 19
PIF_VALUE: 20
PIF_VALUE: 19
PIF_VALUE: 35
PIF_VALUE: 23
PIF_VALUE: 1
PIF_VALUE: 21
PIF_VALUE: 38
PIF_VALUE: 25
PIF_VALUE: 24
PIF_VALUE: 18
PIF_VALUE: 25
PIF_VALUE: 18
PIF_VALUE: 0
PIF_VALUE: 38
PIF_VALUE: 20
PIF_VALUE: 38
PIF_VALUE: 2
PIF_VALUE: 37
PIF_VALUE: 22
PIF_VALUE: 26
PIF_VALUE: 19
PIF_VALUE: 27
PIF_VALUE: 1
PIF_VALUE: 20
PIF_VALUE: 21
PIF_VALUE: 1
PIF_VALUE: 1
PIF_VALUE: 20
PIF_VALUE: 1
PIF_VALUE: 24
PIF_VALUE: 38
PIF_VALUE: 25
PIF_VALUE: 17
PIF_VALUE: 39
PIF_VALUE: 21
PIF_VALUE: 22
PIF_VALUE: 27
PIF_VALUE: 38
PIF_VALUE: 28
PIF_VALUE: 38
PIF_VALUE: 40
PIF_VALUE: 25
PIF_VALUE: 24
PIF_VALUE: 17
PIF_VALUE: 12
PIF_VALUE: 35
PIF_VALUE: 22
PIF_VALUE: 3
PIF_VALUE: 26
PIF_VALUE: 1
PIF_VALUE: 19
PIF_VALUE: 18
PIF_VALUE: 25
PIF_VALUE: 1
PIF_VALUE: 16
PIF_VALUE: 37
PIF_VALUE: 39
PIF_VALUE: 27
PIF_VALUE: 1
PIF_VALUE: 27
PIF_VALUE: 18
PIF_VALUE: 28
PIF_VALUE: 26
PIF_VALUE: 27
PIF_VALUE: 18
PIF_VALUE: 35
PIF_VALUE: 25
PIF_VALUE: 20
PIF_VALUE: 32
PIF_VALUE: 22
PIF_VALUE: 30
PIF_VALUE: 12
PIF_VALUE: 29
PIF_VALUE: 27
PIF_VALUE: 25
PIF_VALUE: 38
PIF_VALUE: 40
PIF_VALUE: 34
PIF_VALUE: 38
PIF_VALUE: 11
PIF_VALUE: 26
PIF_VALUE: 19
PIF_VALUE: 25
PIF_VALUE: 27
PIF_VALUE: 32
PIF_VALUE: 11
PIF_VALUE: 31
PIF_VALUE: 27
PIF_VALUE: 19
PIF_VALUE: 22
PIF_VALUE: 1
PIF_VALUE: 1
PIF_VALUE: 21
PIF_VALUE: 26
PIF_VALUE: 27
PIF_VALUE: 1
PIF_VALUE: 25
PIF_VALUE: 23
PIF_VALUE: 30
PIF_VALUE: 20
PIF_VALUE: 23
PIF_VALUE: 1
PIF_VALUE: 25
PIF_VALUE: 1
PIF_VALUE: 25
PIF_VALUE: 18
PIF_VALUE: 18
PIF_VALUE: 34
PIF_VALUE: 26
PIF_VALUE: 1
PIF_VALUE: 19
PIF_VALUE: 23
PIF_VALUE: 28
PIF_VALUE: 32
PIF_VALUE: 20
PIF_VALUE: 22
PIF_VALUE: 25
PIF_VALUE: 18
PIF_VALUE: 0
PIF_VALUE: 21
PIF_VALUE: 10
PIF_VALUE: 29
PIF_VALUE: 26
PIF_VALUE: 26
PIF_VALUE: 1
PIF_VALUE: 26
PIF_VALUE: 25
PIF_VALUE: 26
PIF_VALUE: 25
PIF_VALUE: 27
PIF_VALUE: 21
PIF_VALUE: 27
PIF_VALUE: 17
PIF_VALUE: 26
PIF_VALUE: 39
PIF_VALUE: 26
PIF_VALUE: 27
PIF_VALUE: 27
PIF_VALUE: 38
PIF_VALUE: 2
PIF_VALUE: 20
PIF_VALUE: 41
PIF_VALUE: 17
PIF_VALUE: 20
PIF_VALUE: 1
PIF_VALUE: 37
PIF_VALUE: 18
PIF_VALUE: 26
PIF_VALUE: 1
PIF_VALUE: 27
PIF_VALUE: 21
PIF_VALUE: 20
PIF_VALUE: 2
PIF_VALUE: 31
PIF_VALUE: 25
PIF_VALUE: 38
PIF_VALUE: 23
PIF_VALUE: 27
PIF_VALUE: 11
PIF_VALUE: 1
PIF_VALUE: 25
PIF_VALUE: 1
PIF_VALUE: 27
PIF_VALUE: 21
PIF_VALUE: 29
PIF_VALUE: 21
PIF_VALUE: 27
PIF_VALUE: 1
PIF_VALUE: 24
PIF_VALUE: 39
PIF_VALUE: 2
PIF_VALUE: 25
PIF_VALUE: 25
PIF_VALUE: 26
PIF_VALUE: 24
PIF_VALUE: 1
PIF_VALUE: 39
PIF_VALUE: 26
PIF_VALUE: 20
PIF_VALUE: 26
PIF_VALUE: 24
PIF_VALUE: 25
PIF_VALUE: 23
PIF_VALUE: 38
PIF_VALUE: 20
PIF_VALUE: 18
PIF_VALUE: 25
PIF_VALUE: 39
PIF_VALUE: 25
PIF_VALUE: 27
PIF_VALUE: 19
PIF_VALUE: 27
PIF_VALUE: 18
PIF_VALUE: 26
PIF_VALUE: 1
PIF_VALUE: 40
PIF_VALUE: 38
PIF_VALUE: 1
PIF_VALUE: 36
PIF_VALUE: 31
PIF_VALUE: 28
PIF_VALUE: 26
PIF_VALUE: 20
PIF_VALUE: 32
PIF_VALUE: 39
PIF_VALUE: 26
PIF_VALUE: 25
PIF_VALUE: 17
PIF_VALUE: 27
PIF_VALUE: 40
PIF_VALUE: 1
PIF_VALUE: 35
PIF_VALUE: 17
PIF_VALUE: 28
PIF_VALUE: 2
PIF_VALUE: 19
PIF_VALUE: 26
PIF_VALUE: 39
PIF_VALUE: 2
PIF_VALUE: 16
PIF_VALUE: 1
PIF_VALUE: 20
PIF_VALUE: 1
PIF_VALUE: 1
PIF_VALUE: 27
PIF_VALUE: 25
PIF_VALUE: 28
PIF_VALUE: 20
PIF_VALUE: 0
PIF_VALUE: 32
PIF_VALUE: 33
PIF_VALUE: 16
PIF_VALUE: 16
PIF_VALUE: 39
PIF_VALUE: 1
PIF_VALUE: 38
PIF_VALUE: 33
PIF_VALUE: 39
PIF_VALUE: 35
PIF_VALUE: 38
PIF_VALUE: 20
PIF_VALUE: 22
PIF_VALUE: 29
PIF_VALUE: 1
PIF_VALUE: 39
PIF_VALUE: 34
PIF_VALUE: 16
PIF_VALUE: 25
PIF_VALUE: 26
PIF_VALUE: 39
PIF_VALUE: 19
PIF_VALUE: 25
PIF_VALUE: 37
PIF_VALUE: 38
PIF_VALUE: 39
PIF_VALUE: 22
PIF_VALUE: 27
PIF_VALUE: 20
PIF_VALUE: 38
PIF_VALUE: 2
PIF_VALUE: 20
PIF_VALUE: 36
PIF_VALUE: 27
PIF_VALUE: 24
PIF_VALUE: 9
PIF_VALUE: 24
PIF_VALUE: 25
PIF_VALUE: 33
PIF_VALUE: 20
PIF_VALUE: 1
PIF_VALUE: 26
PIF_VALUE: 20
PIF_VALUE: 24
PIF_VALUE: 19
PIF_VALUE: 33
PIF_VALUE: 39
PIF_VALUE: 22
PIF_VALUE: 21
PIF_VALUE: 12
PIF_VALUE: 17
PIF_VALUE: 20
PIF_VALUE: 20
PIF_VALUE: 25
PIF_VALUE: 25
PIF_VALUE: 0
PIF_VALUE: 31
PIF_VALUE: 23
PIF_VALUE: 2
PIF_VALUE: 25
PIF_VALUE: 33
PIF_VALUE: 26
PIF_VALUE: 25
PIF_VALUE: 14
PIF_VALUE: 19
PIF_VALUE: 1
PIF_VALUE: 24
PIF_VALUE: 1
PIF_VALUE: 39
PIF_VALUE: 6
PIF_VALUE: 22
PIF_VALUE: 1
PIF_VALUE: 26
PIF_VALUE: 33
PIF_VALUE: 19
PIF_VALUE: 1
PIF_VALUE: 27
PIF_VALUE: 21
PIF_VALUE: 37
PIF_VALUE: 18
PIF_VALUE: 28
PIF_VALUE: 18
PIF_VALUE: 25
PIF_VALUE: 23
PIF_VALUE: 39
PIF_VALUE: 24
PIF_VALUE: 26
PIF_VALUE: 37
PIF_VALUE: 20
PIF_VALUE: 27
PIF_VALUE: 39
PIF_VALUE: 20
PIF_VALUE: 28
PIF_VALUE: 20
PIF_VALUE: 35
PIF_VALUE: 0
PIF_VALUE: 41
PIF_VALUE: 22
PIF_VALUE: 39
PIF_VALUE: 22
PIF_VALUE: 19
PIF_VALUE: 38
PIF_VALUE: 25
PIF_VALUE: 19
PIF_VALUE: 17
PIF_VALUE: 28
PIF_VALUE: 28
PIF_VALUE: 26
PIF_VALUE: 21
PIF_VALUE: 27
PIF_VALUE: 27
PIF_VALUE: 24
PIF_VALUE: 40
PIF_VALUE: 2
PIF_VALUE: 19
PIF_VALUE: 41
PIF_VALUE: 38
PIF_VALUE: 28
PIF_VALUE: 28
PIF_VALUE: 1
PIF_VALUE: 26
PIF_VALUE: 17
PIF_VALUE: 38
PIF_VALUE: 18
PIF_VALUE: 27
PIF_VALUE: 28
PIF_VALUE: 38
PIF_VALUE: 27
PIF_VALUE: 39
PIF_VALUE: 19
PIF_VALUE: 1
PIF_VALUE: 22
PIF_VALUE: 19
PIF_VALUE: 16
PIF_VALUE: 28
PIF_VALUE: 24
PIF_VALUE: 27
PIF_VALUE: 20
PIF_VALUE: 23
PIF_VALUE: 16
PIF_VALUE: 35
PIF_VALUE: 40
PIF_VALUE: 26
PIF_VALUE: 39
PIF_VALUE: 26
PIF_VALUE: 39
PIF_VALUE: 25
PIF_VALUE: 28
PIF_VALUE: 1
PIF_VALUE: 28
PIF_VALUE: 39
PIF_VALUE: 26
PIF_VALUE: 39
PIF_VALUE: 1
PIF_VALUE: 28
PIF_VALUE: 3
PIF_VALUE: 27
PIF_VALUE: 20
PIF_VALUE: 27
PIF_VALUE: 26
PIF_VALUE: 32
PIF_VALUE: 41
PIF_VALUE: 1
PIF_VALUE: 21
PIF_VALUE: 37
PIF_VALUE: 21
PIF_VALUE: 21
PIF_VALUE: 1
PIF_VALUE: 18
PIF_VALUE: 27
PIF_VALUE: 26
PIF_VALUE: 33
PIF_VALUE: 32
PIF_VALUE: 2
PIF_VALUE: 11
PIF_VALUE: 35
PIF_VALUE: 39
PIF_VALUE: 41
PIF_VALUE: 20
PIF_VALUE: 39
PIF_VALUE: 40
PIF_VALUE: 0
PIF_VALUE: 26
PIF_VALUE: 27
PIF_VALUE: 1
PIF_VALUE: 27
PIF_VALUE: 1
PIF_VALUE: 39
PIF_VALUE: 38
PIF_VALUE: 20
PIF_VALUE: 1
PIF_VALUE: 38
PIF_VALUE: 1
PIF_VALUE: 26
PIF_VALUE: 25
PIF_VALUE: 38
PIF_VALUE: 26
PIF_VALUE: 1
PIF_VALUE: 1
PIF_VALUE: 29
PIF_VALUE: 20
PIF_VALUE: 20
PIF_VALUE: 18
PIF_VALUE: 38
PIF_VALUE: 20
PIF_VALUE: 40
PIF_VALUE: 20
PIF_VALUE: 1
PIF_VALUE: 35
PIF_VALUE: 22
PIF_VALUE: 18
PIF_VALUE: 28
PIF_VALUE: 28
PIF_VALUE: 1
PIF_VALUE: 27
PIF_VALUE: 27
PIF_VALUE: 41
PIF_VALUE: 2

## 2020-01-01 ASSESSMENT — PAIN SCALES - GENERAL
PAINLEVEL_OUTOF10: 0
PAINLEVEL_OUTOF10: 6
PAINLEVEL_OUTOF10: 0
PAINLEVEL_OUTOF10: 1
PAINLEVEL_OUTOF10: 0
PAINLEVEL_OUTOF10: 10
PAINLEVEL_OUTOF10: 0
PAINLEVEL_OUTOF10: 2
PAINLEVEL_OUTOF10: 0
PAINLEVEL_OUTOF10: 4
PAINLEVEL_OUTOF10: 0
PAINLEVEL_OUTOF10: 0
PAINLEVEL_OUTOF10: 8
PAINLEVEL_OUTOF10: 0
PAINLEVEL_OUTOF10: 10
PAINLEVEL_OUTOF10: 0
PAINLEVEL_OUTOF10: 5
PAINLEVEL_OUTOF10: 0
PAINLEVEL_OUTOF10: 0
PAINLEVEL_OUTOF10: 10
PAINLEVEL_OUTOF10: 0
PAINLEVEL_OUTOF10: 4
PAINLEVEL_OUTOF10: 0
PAINLEVEL_OUTOF10: 5
PAINLEVEL_OUTOF10: 0
PAINLEVEL_OUTOF10: 4
PAINLEVEL_OUTOF10: 0
PAINLEVEL_OUTOF10: 10
PAINLEVEL_OUTOF10: 0

## 2020-01-01 ASSESSMENT — PAIN DESCRIPTION - LOCATION
LOCATION: ABDOMEN;CHEST;BACK
LOCATION: ABDOMEN;CHEST;BACK;LEG
LOCATION: ABDOMEN;CHEST;BACK

## 2020-01-01 ASSESSMENT — PAIN DESCRIPTION - FREQUENCY
FREQUENCY: CONTINUOUS

## 2020-01-01 ASSESSMENT — PAIN DESCRIPTION - ONSET: ONSET: SUDDEN

## 2020-01-01 ASSESSMENT — LIFESTYLE VARIABLES
SMOKING_STATUS: 1
SMOKING_STATUS: 1

## 2020-03-05 PROBLEM — I71.21 ASCENDING AORTIC ANEURYSM: Status: ACTIVE | Noted: 2020-01-01

## 2020-03-05 NOTE — ED NOTES
Call placed by [facility] for cardiovascular on call     Emir Jones RN  03/05/20 [phone]

## 2020-03-05 NOTE — ED NOTES
O2 changed to non rebreather mask. Girlfriend and patient aware of transfer to [facility] [address] coming.      Inés Sadler RN  03/05/20 [phone]

## 2020-03-05 NOTE — ED PROVIDER NOTES
Pulse 87      Resp (!) 86      Temp 98.3 °F (36.8 °C)      Temp Source Oral      SpO2 93 %      Weight 279 lb 15.8 oz (127 kg)      Height 5' 11\" (1.803 m)      Head Circumference       Peak Flow       Pain Score       Pain Loc       Pain Edu? Excl. in [address]? My pulse ox interpretation is - normal    General appearance:  Severe distress acute distress. Skin:  Warm. Dry. Eye:  Extraocular movements intact. Ears, nose, mouth and throat:  Oral mucosa moist   Neck:  Trachea midline. Extremity:  No swelling. Normal ROM     Heart:  Regular rate and rhythm, normal S1 & S2, no extra heart sounds. Perfusion:  intact but diminished pulse right leg compared to left leg,  Respiratory:  Lungs clear to auscultation bilaterally. Respirations nonlabored. Abdominal:  Normal bowel sounds. Soft. Nontender. Non distended. Neurological:  Alert and oriented times 3.              Psychiatric:  Appropriate    I have reviewed and interpreted all of the currently available lab results from this visit (if applicable):  Results for orders placed or performed during the hospital encounter of 03/05/20   CBC Auto Differential   Result Value Ref Range    WBC 7.5 4.0 - 11.0 K/uL    RBC 5.66 4.20 - 5.90 M/uL    Hemoglobin 16.0 13.5 - 17.5 g/dL    Hematocrit 49.2 40.5 - 52.5 %    MCV 86.9 80.0 - 100.0 fL    MCH 28.2 26.0 - 34.0 pg    MCHC 32.4 31.0 - 36.0 g/dL    RDW 13.4 12.4 - 15.4 %    Platelets 900 036 - 210 K/uL    MPV 7.4 5.0 - 10.5 fL    Neutrophils % 66.2 %    Lymphocytes % 21.3 %    Monocytes % 9.4 %    Eosinophils % 2.0 %    Basophils % 1.1 %    Neutrophils Absolute 5.0 1.7 - 7.7 K/uL    Lymphocytes Absolute 1.6 1.0 - 5.1 K/uL    Monocytes Absolute 0.7 0.0 - 1.3 K/uL    Eosinophils Absolute 0.2 0.0 - 0.6 K/uL    Basophils Absolute 0.1 0.0 - 0.2 K/uL   Troponin   Result Value Ref Range    Troponin 0.19 (H) <0.01 ng/mL   Hepatic Function Panel   Result Value Ref Range    Total Protein 7.1 6.4 - 8.2 g/dL Alb 4.0 3.4 - 5.0 g/dL    Alkaline Phosphatase 49 40 - 129 U/L    ALT 13 10 - 40 U/L    AST 17 15 - 37 U/L    Total Bilirubin 1.8 (H) 0.0 - 1.0 mg/dL    Bilirubin, Direct 0.3 0.0 - 0.3 mg/dL    Bilirubin, Indirect 1.5 (H) 0.0 - 1.0 mg/dL   Lipase   Result Value Ref Range    Lipase 73.0 (H) 13.0 - 60.0 U/L      Radiographs (if obtained):  Radiologist's Report Reviewed:  No results found. EKG (if obtained): (All EKG's are interpreted by myself in the absence of a cardiologist)  Sinus rhythm, mild ST segment elevation of V2 and V3, with ST segment depression in 1 and aVL    MDM:  Patient presented to the ER for evaluation of epigastric abdominal pain with ration was chest back and leg, and had a high pretest probability and concern for aortic dissection. No laboratory values were initially performed due to the high pretest probability and concern for proximal and distal aortic dissection which was confirmed with CT scan of a type a dissection with extension distally as well. He did have some EKG manifestations of troponin leak as well, his electrolytes CBC and coags are currently pending. Cardiothoracic surgery was notified as well as initial interventional cardiologist at this time of presentation stating his of the high pretest probability and concern for aortic dissection and abnormal EKG, he is to be flown to [facility] Dr. Tru Grant and Dr. Lala Daigle were notified from cardiothoracic surgery. The patient was given nitroglycerin sublingual, labetalol IV and a Cardene infusion which was titrated up to 10 mg/h, he received 100 mcg of fentanyl and an additional 50 echograms bolus of fentanyl. Patient and his family were apprised of the patient's life-threatening condition, and they verbalized an understanding. He is being air cared to [facility] for definitive cardiothoracic surgical evaluation. Blood pressure and heart rate have been controlled, analgesia provided as well.     Condition: Critical  Total

## 2020-03-05 NOTE — CONSULTS
Consultation H&P    Date of Admission:  3/5/2020  6:48 AM  Date of Consultation:  3/5/2020    PCP:  No primary care provider on file. Chief Complaint: type A dissection    History of Present Illness:   Terese Runner is a 35 y.o. male with hx htn who had stopped taking meds. Developed severe abd/back pain today at 05:30. Presented to [facility] ED. bp 160. CT chest type A dissection, no abd images to know extent. Arrived at [facility] air care. Still having pain. Nicardipine gtt ran out. RLE paresthesias. Neuro intact. Past Medical History:  Past Medical History:   Diagnosis Date    HTN (hypertension)     Obesity     BMI 36    Tattoos     Vision loss of left eye        Past Surgical History:  Past Surgical History:   Procedure Laterality Date    EYE SURGERY      left, some sort of tube    FRACTURE SURGERY Left     ankle. plates. Home Medications:   Prior to Admission medications    Not on File        Facility Administered Medications:    sodium chloride flush  10 mL Intravenous 2 times per day    vancomycin (VANCOCIN) IV  2,000 mg Intravenous On Call to OR    chlorhexidine  15 mL Mouth/Throat Once    chlorhexidine   Topical See Admin Instructions    ceFAZolin (ANCEF) IVPB  3 g Intravenous On Call to OR    mupirocin   Nasal BID    metoclopramide  10 mg Intravenous Once    famotidine (PEPCID) injection  20 mg Intravenous Once       Allergies:     Allergies   Allergen Reactions    Ibuprofen         Social History:    Working: construction  Lifestyle: lives with girlfriend, mother    Social History     Socioeconomic History    Marital status: Single     Spouse name: Not on file    Number of children: Not on file    Years of education: Not on file    Highest education level: Not on file   Occupational History    Not on file   Social Needs    Financial resource strain: Not on file    Food insecurity:     Worry: Not on file     Inability: Not on file    Transportation needs: Medical: Not on file     Non-medical: Not on file   Tobacco Use    Smoking status: Current Every Day Smoker     Packs/day: 0.50     Types: Cigarettes    Smokeless tobacco: Never Used    Tobacco comment: started age 12.    Substance and Sexual Activity    Alcohol use: Yes     Comment: Rarely    Drug use: Yes     Types: Marijuana     Comment: last use 3/4/20    Sexual activity: Not on file   Lifestyle    Physical activity:     Days per week: Not on file     Minutes per session: Not on file    Stress: Not on file   Relationships    Social connections:     Talks on phone: Not on file     Gets together: Not on file     Attends Denominational service: Not on file     Active member of club or organization: Not on file     Attends meetings of clubs or organizations: Not on file     Relationship status: Not on file    Intimate partner violence:     Fear of current or ex partner: Not on file     Emotionally abused: Not on file     Physically abused: Not on file     Forced sexual activity: Not on file   Other Topics Concern    Not on file   Social History Narrative    Not on file       Family History:      Problem Relation Age of Onset    Diabetes Mother     Diabetes Sister        Review of Systems:  Reviewed, negative unless noted  Constitutional: weight change, weakness, impairment of ADLs  Eyes: eyestrain, redness, discharges  ENMT: post nasal drip, sinus pain, discharge   Cardiovascular: faintness, vertigo, color changes in fingers/toes  Respiratory: cough, sputum, hemoptysis  GI: excessive thirst, changes in bowel habits, abdominal swelling  : painful urination, pyuria, incontinence  Musculoskeletal: cramps, swelling, limitation of motor activity  Integumentary: cyanosis, changes in skin, dryness  Neurological: paralysis, tingling, tremors  Psychiatric: restlessness, irritability, mood swings  Endocrine: heat/cold intolerance, excessive sweating, hair loss  Hematologic/lymphatic: swollen glands, anemia, easy bruising/bleeding      Physical Examination:    /61   Pulse 93   Temp 98.3 °F (36.8 °C) (Oral)   Resp 14   Ht 5' 11\" (1.803 m)   Wt 283 lb 11.7 oz (128.7 kg)   SpO2 94%   BMI 39.57 kg/m²      BP RUE: 125/61 BP LUE: 142/79  Admission Weight: 279 lb 15.8 oz (127 kg)   Hand dominance: left    General appearance: writhing in pain, obese  Eyes: anicteric, bloodshot, L pupil asymmetric  ENMT: no scars or lesions, no nasal deformity, normal dentition, no cyanosis of oral mucosa  Neck: no masses, no thyroid enlargement, no JVD. Respiratory: effort is unlabored, symmetric, no crackles, wheezes or rubs. No palpable/percussable abnormalities. Cardiovascular: regular, soft murmur aortic position. PMI normal, no thrill. No carotid bruits. No edema or varicosities. Abdominal aorta cannot be appreciated given body habitus. Pulses:    carotid brachial radial femoral popliteal DP PT   RIGHT   2+ -  - -   LEFT   2+ Dopp  2+ 2+   GI: abdomen soft, nondistended, no organomegaly. No masses. Lymphatic: no cervical/supraclavicular adenopathy  Musculoskeletal: strength and tone normal. Full ROM. No scoliosis. Extremities: warm and pink. No clubbing or petechiae. Skin: no dermatitis or ulceration. No nodularity or induration. Neuro: CN grossly intact. Sensation and motor function grossly intact. RLE motion/sensation intact, subjective paresthesias  Psychiatric: oriented, appropriate mood/affect. MEDICAL DECISION MAKING/TESTING  Studies personally reviewed. CT chest: 3/5/20  Pulmonary Arteries:  There is no acute pulmonary thromboembolus.       Mediastinum: The heart is unremarkable.  There are no enlarged thoracic lymph   nodes.       However, there is an acute dissection without aneurysm originating within the   ascending thoracic aorta just distal to the sino-tubular junction.  The   dissection extends distally with the true lumen involving the 3 major   branches of the aortic arch.  The dissection extends into pulmonary and renal issues. Pt at first unwilling to proceed with surgery. Talked to girlfriend who urges him to agree. Pt now willing to proceed.     Mustapha Read MD  3/5/2020  8:56 AM

## 2020-03-05 NOTE — ANESTHESIA PRE PROCEDURE
Tests: No results for input(s): POCGLU, POCNA, POCK, POCCL, POCBUN, POCHEMO, POCHCT in the last 72 hours. Coags:   Lab Results   Component Value Date    PROTIME 13.1 03/05/2020    INR 1.13 03/05/2020    APTT 28.9 03/05/2020       HCG (If Applicable): No results found for: PREGTESTUR, PREGSERUM, HCG, HCGQUANT     ABGs: No results found for: PHART, PO2ART, BZE8LWX, FVR8GOS, BEART, V7DDQTXQ     Type & Screen (If Applicable):  No results found for: LABABO, [address]    Anesthesia Evaluation  Patient summary reviewed and Nursing notes reviewed no history of anesthetic complications:   Airway: Mallampati: III  TM distance: >3 FB   Neck ROM: full  Mouth opening: > = 3 FB Dental:      Comment: Upper left molar with abscess    Pulmonary:   (+) decreased breath sounds,  current smoker    (-) pneumonia, COPD, asthma and recent URI                           Cardiovascular:            Rhythm: regular                   ROS comment: TYpe A dissection     Neuro/Psych:   Negative Neuro/Psych ROS              GI/Hepatic/Renal:   (+) renal disease: ARF, morbid obesity          Endo/Other:                     Abdominal:           Vascular: negative vascular ROS. Anesthesia Plan      general     ASA 4 - emergent       Induction: intravenous. BIS, arterial line, central line, CVP, PA catheter and TERRY  MIPS: Postoperative opioids intended and Prophylactic antiemetics administered. Anesthetic plan and risks discussed with patient. Use of blood products discussed with patient whom consented to blood products. Plan discussed with CRNA. Toribio Chu MD   3/5/2020    This pre-anesthesia assessment may be used as a history and physical.    DOS STAFF ADDENDUM:    Pt seen and examined, chart reviewed (including anesthesia, drug and allergy history). No interval changes to history and physical examination.   Anesthetic plan, risks, benefits, alternatives, and personnel involved discussed with patient. Patient verbalized an understanding and agrees to proceed.       Roxana Lund MD  March 5, 2020  8:46 AM

## 2020-03-05 NOTE — ED TRIAGE NOTES
States that yesterday he was having pain in his abdomen, and it started shooting to his chest, back and legs.

## 2020-03-05 NOTE — ANESTHESIA PROCEDURE NOTES
Procedure Performed: TERRY     Start Time:        End Time:      Preanesthesia Checklist:  Patient identified, IV assessed, risks and benefits discussed, monitors and equipment assessed, procedure being performed at surgeon's request and anesthesia consent obtained. General Procedure Information  Diagnostic Indications for Echo:  assessment of surgical repair, hemodynamic monitoring and assessment of valve function  Physician Requesting Echo: Rukhsana Mcgarry MD  Location performed:  OR  Intubated  Bite block placed  Heart visualized  Probe Insertion:  Easy  Probe Type:  3D  Modalities:  M-mode, continuous wave Doppler, color flow mapping and 3D    Echocardiographic and Doppler Measurements    Ventricles    Right Ventricle:  Global function normal.    Left Ventricle:  Global Function normal.    Other Ventricular Findings:       Normal function for RV and LV        Valves    Aortic Valve: Annulus normal.  Stenosis not present. Regurgitation none. Leaflets normal.  Leaflet motions normal.      Mitral Valve: Annulus normal.  Stenosis not present. Regurgitation none. Leaflets normal.  Leaflet motions normal.      Other Valve Findings:       Valves a re normal. Type A dissection is NOT causing AI and is not prolapsing miriam aortic root or through the AV        Atria    Right Atrium:  Spontaneous echo contrast not present. Thrombus not present. Tumor not present. Left Atrium:  Spontaneous echo contrast not present. Tumor not present. Septa        Ventricular Septum:  Intra-ventricular septum morphology hypertrophy.       Other ventricular septal defect findings:       LVH, septal hypertrophy at 3+cm        Other Findings  Pericardium:  normal      Anesthesia Information  Performed Personally  Anesthesiologist:  Michael Salazar MD

## 2020-03-06 NOTE — PROGRESS NOTES
Department of Internal Medicine  Nephrology Progress Note    SUBJECTIVE:  We are following this patient for mynor . Patient progress reviewed. REVIEW OF SYSTEMS:  On vent     Physical Exam:    VITALS:  /61   Pulse 97   Temp 97.5 °F (36.4 °C) (Core)   Resp 20   Ht 5' 11\" (1.803 m)   Wt 289 lb 11 oz (131.4 kg)   SpO2 (!) 86%   BMI 40.40 kg/m²   24HR INTAKE/OUTPUT:      Intake/Output Summary (Last 24 hours) at 3/6/2020 1412  Last data filed at 3/6/2020 1403  Gross per 24 hour   Intake 5437.6 ml   Output 2731 ml   Net 2706.6 ml       Constitutional:  On vent   Respiratory:  Decrease  BS at bases.    Gastrointestinal:  Distended , hypo active  Bowel Sounds  Cardiovascular:  S1, S2 RRR   Edema:  Trace edema    DATA:    CBC:  Lab Results   Component Value Date    WBC 12.3 03/06/2020    RBC 4.46 03/06/2020    HGB 12.5 03/06/2020    HCT 38.8 03/06/2020    MCV 86.9 03/06/2020    MCH 28.1 03/06/2020    MCHC 32.3 03/06/2020    RDW 13.6 03/06/2020     03/06/2020    MPV 8.4 03/06/2020     CMP:  Lab Results   Component Value Date     03/06/2020    K 3.6 03/06/2020     03/06/2020    CO2 25 03/06/2020    BUN 24 03/06/2020    CREATININE 2.0 03/06/2020    GFRAA 45 03/06/2020    LABGLOM 37 03/06/2020    GLUCOSE 116 03/06/2020    PROT 7.1 03/05/2020    CALCIUM 8.9 03/06/2020    BILITOT 1.8 03/05/2020    ALKPHOS 49 03/05/2020    AST 17 03/05/2020    ALT 13 03/05/2020      Hepatic Function Panel:   Lab Results   Component Value Date    ALKPHOS 49 03/05/2020    ALT 13 03/05/2020    AST 17 03/05/2020    PROT 7.1 03/05/2020    BILITOT 1.8 03/05/2020    BILIDIR 0.3 03/05/2020    IBILI 1.5 03/05/2020      Phosphorus:   Lab Results   Component Value Date    PHOS 4.2 03/06/2020       ASSESSMENT:  Active Problems:    Ascending aortic aneurysm (HCC)    Dissection of thoracic aorta (HCC)    Dissecting aneurysm of thoracic aorta, [place] type A (HCC)    Acute respiratory failure with hypoxia (HCC)    Centrilobular emphysema ([facility] [address]    JEYSON (acute kidney injury) ([facility] [address]  Resolved Problems:    * No resolved hospital problems. *      PLAN:  1. JEYSON Most likely ATN from JOHNNY. 2. Contiue with IVF. Keep SBP> 90mmHg . Avoid nephrotoxins . Dc ACE-I .   3. HTN controlled. On Gtts . 4. Resp failure post op on vent . 5. Type A aortic dissection S/p repair the patient also has Type B will need intervention . 6. Vascular  on board. 7. Meds reviewed and adjusted.      Cary Dejesus MD

## 2020-03-06 NOTE — PROGRESS NOTES
I reassessed patient due to hypoxia. He was in VC+ mode with PEEP of 12. He was saturating in the low 80's. I tried recruitment maneuver (PC of 20 and PEEP of 20) for approx 30 seconds, followed by slow weaning of PEEP by 2 in PC mode. PS mode was tried as well with high PEEP. Higher PEEP did not help his saturations and caused desaturation (? Over-distention). I then tried bilevel with marginal success. I increased inspiratory time to 2.5 sec with PEEP low of 0 in bilevel. His saturations increased to 88%. Will get ABG in 1 hour. Will give lasix now and possibly start lasix gtt. I discussed diuretics with Dr. Evin Romero. May ultimately need CRRT    Additional critical care time of 35 minutes for total time of 66 minutes for the day.     DO Janelle Serrato Pulmonary, Sleep and Critical Care  [phone]

## 2020-03-06 NOTE — PROGRESS NOTES
I reassessed patient again due to hypoxia. He is saturating in the low 80's. I have tried and tried multiple vent settings etc.  I think it is best to place vascath and start CRRT. Vascath to be placed in groin. I spoke with patient's Mom and girlfriend. I explained how severely ill he is. They had a lot of questions because a remote family member had an aortic dissection and was in hospital for 7 months. They assume every case is the same. I did not disclose his drug screen. I would recommend RRT with fluid removal if able to. Additional critical care time of 30 minutes. Not including procedural time. Total CC time of 96 minutes.

## 2020-03-06 NOTE — CONSULTS
ventilator, sedated. VITAL SIGNS:  Blood pressure is 99/67, pulse 98, temperature 97.8. HEENT EXAMINATION:  Pupils are reactive to light. CHEST:  Decreased breath sounds in both bases. CVS:  S1 and S2 audible. Regular rate and rhythm. ABDOMEN:  Soft, distended. No bowel sounds. EXTREMITIES:  Trace edema. CNS:  Sedated. LABORATORY DATA:  Sodium 140, potassium is 3.6, chloride 100, bicarb 25,  BUN 24, creatinine 2.0. Magnesium 2.4. WBC is 12.3, hemoglobin 12.5,  hematocrit 38.8, platelet count of 634. IMPRESSION:  Acute kidney injury most likely secondary to:  1. Contrast-induced nephropathy. 2.  Lisinopril. 3.  Hypotension [hemodynamics]. PLAN:  1. Daily weight. 2.  Strict I's and O's.  3.  Continue with IV hydration. 4.  Keep systolic blood pressure 90 or above. 5.  The patient was noted to have type B aortic dissection apparently  likely to be retrograde arch. The patient had aortic arch replacement  per Surgery. After the patient stabilizes, he will need CTA of chest,  abdomen and pelvis. At this point, the Vascular Surgery is awaiting for  the patient to be stabilized so they can do a CTA of chest and abdomen  and pelvis. They may want to go for the repair if deemed necessary. Avoid all the nephrotoxin. We will follow.         Saadia Elliott MD    D: 03/06/2020 14:21:05       T: 03/06/2020 16:17:10     [email or web address]  Job#: [id number]     Doc#: [id number]    CC:

## 2020-03-06 NOTE — PROGRESS NOTES
Dr. Nida García called to check on the pt's status. After all the hemodynamic questions was answered, I clarified if she wanted SCD's on or leon hose.  She stated that she wanted SCD's no Teds

## 2020-03-06 NOTE — OP NOTE
[address] [facility]                                OPERATIVE REPORT    PATIENT NAME: Rome Sims                    :        1980  MED REC NO:   [id number]                          ROOM:       1302  ACCOUNT NO:   [de-identified]                           ADMIT DATE: 2020  PROVIDER:     Samson Jackson MD    DATE OF PROCEDURE:  2020    PREOPERATIVE DIAGNOSIS:  Type A dissection. POSTOPERATIVE DIAGNOSIS:  Type B dissection. OPERATION PERFORMED:  1.  TERRY. 2.  Left lateral canthotomy. 3.  Repair of retrograde ascending aortic dissection-deep hypothermic  circulatory arrest, Aortic valve resuspension, Ascending aortic tube  graft (30 mm Hemashield). SURGEON:  Samson Jackson MD    ASSISTANTS:  Tammie Plascencia SA and Rey Goldstein    ANESTHESIA:  General.    INDICATIONS:  The patient is a 71-year-old gentleman with history of  hypertension who had been off medication. He developed acute onset of  abdominal pain at 5:30 this morning and presented to the emergency room. CT of the chest showed dissected aorta from the sinotubular junction to  the upper abdomen. The patient was transferred to [facility].  The sclera  were noted to be hyperemic. The patient was neurologically intact, but having paresthesias of the  right leg. There was absence of pulses and Doppler signals noted. The  patient was taken emergently to the operating room for repair of  presumed type A dissection. He was aware of the risks and possible  complications of the procedure and after some reluctance and discussion  with his girlfriend and mother, agreed to proceed. OPERATIVE FINDINGS:  The preprocedural TERRY showed well-preserved left  ventricular function and absence of any aortic insufficiency. False  lumen in the ascending arch and descending aorta was noted.   On direct  inspection, the ventricular function appeared good.  There was no  pericardial effusion. There was adventitial staining of the aorta on  the right side, just at the sinotubular junction extending distally and  spiraling anteriorly at the level of the arch. Once opened, it was evident that the true lumen was compressed in a  corrugated fashion from the sinotubular junction distally. The aortic  valve was trileaflet and coapted well. There was no entry tear noted. In addition the eyes which had been hyperemic and blood shot developed  further significant bleeding consistent with hyphema and protrusion  during induction. Intraoperative consultation with an ophthalmologist was sought. The  verbal instruction to the anesthesia staff was to perform left lateral  canthotomy and patch the eye. The completion TERRY showed continued  well-preserved left ventricular function without any evidence of aortic  insufficiency. There was continued flow noted in the false lumen in the  descending aorta. The right leg was still absent pulses. Intraoperative consultation with Vascular Surgery was obtained. OPERATIVE PROCEDURE:  The patient was brought to the operating room,  placed supine on the operating table. General anesthesia was induced  without complication. In the course of prepping and draping the  patient, it was noted that the left eye in particular had become  hyperemic and then hyphemic as well as protuberant. This was communicated to Ophthalmology. The patient was prepped and draped expeditiously. After assuring that preoperative antibiotics had been given, a  transverse incision was made just inferior to the right lateral  clavicle. Dissection was carried down to the level of the axillary  artery. As this was being undertaken, left lateral canthotomy was  performed by the Anesthesia staff. An eye patch was placed. 5000 units  of heparin was given.   An 8-mm Gelweave side graft was sewn to the right  axillary artery, which was then Platelet-poor gel was applied intrapericardially. Two mediastinal chest tubes were placed in addition to a ventricular  pacing wire. The pericardium was closed. The sternum was approximated  with #7 sternal wires after application of platelet-rich gel to the  oozing bony edges. The wound was irrigated with warm antibiotic  solution and closed in layers with 0 Vicryl and 2-0 Vicryl suture and  4-0 Monocryl running subcuticular closure. The axillary cannulation site was addressed next. The side graft was  clamped and amputated. The stump was oversewn with 5-0 Prolene suture. A piece of Nu-Knit was used to pack the wound, which was then closed  with 0 Vicryl and 2-0 Vicryl sutures and 4-0 Monocryl running  subcuticular closure. This was covered with Skin Affix. Sterile  dressings were applied to the sternotomy site and chest tube sites. The  patient tolerated the procedure well without any complications. Cardiopulmonary bypass time was 206 minutes. Circulatory arrest time  was 74 minutes. Antegrade cerebral perfusion time was 74 minutes. Estimated blood loss was not applicable. Vascular surgery examined the  patient. There were absent pulses in the right leg, same as  preoperatively. The patient was transferred to the cardiovascular unit  on milrinone and nitroglycerin drips. Sponge and needle count was  correct at the end of the case.         Jayshree Esqueda MD    D: 03/05/2020 16:58:32       T: 03/06/2020 2:10:20     [email or web address]  Job#: [id number]     Doc#: [id number]    CC:

## 2020-03-06 NOTE — CONSULTS
 Smokeless tobacco: Never Used    Tobacco comment: started age 12.    Substance and Sexual Activity    Alcohol use: Yes     Comment: Rarely    Drug use: Yes     Types: Marijuana     Comment: last use 3/4/20    Sexual activity: None   Lifestyle    Physical activity:     Days per week: None     Minutes per session: None    Stress: None   Relationships    Social connections:     Talks on phone: None     Gets together: None     Attends Yazidism service: None     Active member of club or organization: None     Attends meetings of clubs or organizations: None     Relationship status: None    Intimate partner violence:     Fear of current or ex partner: None     Emotionally abused: None     Physically abused: None     Forced sexual activity: None   Other Topics Concern    None   Social History Narrative    None     Current Facility-Administered Medications   Medication Dose Route Frequency Provider Last Rate Last Dose    0.9 % sodium chloride infusion   Intravenous Continuous Kelly Swanson MD 50 mL/hr at 03/05/20 1615      sodium chloride flush 0.9 % injection 10 mL  10 mL Intravenous 2 times per day Kelly Swanson MD   10 mL at 03/05/20 2302    sodium chloride flush 0.9 % injection 10 mL  10 mL Intravenous PRN Kelly Swanson MD        potassium chloride 20 mEq/50 mL IVPB (Central Line)  20 mEq Intravenous PRN Kelly Swanson MD   Stopped at 03/05/20 2300    magnesium sulfate 2 g in 50 mL IVPB premix  2 g Intravenous PRN Kelly Swanson MD        calcium chloride 1 g in sodium chloride 0.9 % 100 mL IVPB  1 g Intravenous PRN Kelly Swanson MD        calcium chloride 2 g in sodium chloride 0.9 % 100 mL IVPB  2 g Intravenous PRN Kelly Swanson MD        ceFAZolin (ANCEF) 3 g in dextrose 5 % 100 mL IVPB  3 g Intravenous Vna Marcelino MD   Stopped at 03/06/20 0617    vancomycin (VANCOCIN) 2,000 mg in dextrose 5 % 500 mL IVPB  2,000 mg Intravenous Q12H Kelly Swanson MD   Stopped at tablet 10 mEq  10 mEq Oral TID  Jerald Villareal MD        atorvastatin (LIPITOR) tablet 40 mg  40 mg Oral Nightly Jerald Villareal MD        aspirin EC tablet 325 mg  325 mg Oral Daily Jerald Villareal MD        albuterol (PROVENTIL) nebulizer solution 2.5 mg  2.5 mg Nebulization Q4H WA Jerald Villareal MD   2.5 mg at 03/05/20 2008    albuterol sulfate  (90 Base) MCG/ACT inhaler 2 puff  2 puff Inhalation Q6H PRN Jerald Villareal MD        albumin human 5 % IV solution 25 g  25 g Intravenous PRN Jerald Villareal MD   25 g at 03/06/20 0220    0.9 % sodium chloride bolus  500 mL Intravenous Continuous PRN Jerald Villareal MD        norepinephrine (LEVOPHED) 16 mg in dextrose 5% 250 mL infusion  2 mcg/min Intravenous Continuous PRN Jerald Villareal MD        nitroGLYCERIN 50 mg in dextrose 5% 250 mL infusion  10 mcg/min Intravenous Continuous PRN Jerald Villareal MD 60 mL/hr at 03/06/20 0613 200 mcg/min at 03/06/20 0613    niCARdipine (CARDENE) 25 mg in sodium chloride 0.9 % 250 mL infusion  5 mg/hr Intravenous Continuous PRN Jerald Villareal MD 75 mL/hr at 03/06/20 0642 7.5 mg/hr at 03/06/20 0642    insulin regular (HUMULIN R;NOVOLIN R) 100 Units in sodium chloride 0.9 % 100 mL infusion  1 Units/hr Intravenous Continuous Jerald Villareal MD 4.7 mL/hr at 03/06/20 0510 4.7 Units/hr at 03/06/20 0510    insulin glargine (LANTUS) injection vial 19 Units  0.15 Units/kg Subcutaneous Nightly Jerald Villareal MD        [START ON 3/7/2020] insulin lispro (HUMALOG) injection vial 0-12 Units  0-12 Units Subcutaneous TID  MD Christian Denny ON 3/7/2020] insulin lispro (HUMALOG) injection vial 0-6 Units  0-6 Units Subcutaneous Nightly Hillary Rios MD        glucose (GLUTOSE) 40 % oral gel 15 g  15 g Oral PRN Jerald Villareal MD        dextrose 50 % IV solution  12.5 g Intravenous PRN Jerald Villareal MD        glucagon (rDNA) injection 1 mg  1 mg Intramuscular PRN Jerald Villareal MD  dextrose 5 % solution  100 mL/hr Intravenous PRN Aaron Webb MD        sennosides-docusate sodium (SENOKOT-S) 8.6-50 MG tablet 1 tablet  1 tablet Oral BID Aaron Webb MD   1 tablet at 03/05/20 2023    milrinone (PRIMACOR) 20 mg in dextrose 5 % 100 mL infusion  0.375 mcg/kg/min (Order-Specific) Intravenous Continuous PRN Aaron Webb MD 3 mL/hr at 03/06/20 0303 0.1 mcg/kg/min at 03/06/20 0303    propofol injection  10 mcg/kg/min Intravenous Titrated SEBASTIAN Reyna CNP 15.4 mL/hr at 03/06/20 0605 20 mcg/kg/min at 03/06/20 9302    erythromycin [facility]) ophthalmic ointment   Left Eye BID SEBASTIAN Reyna CNP         Allergies   Allergen Reactions    Ibuprofen        Review of Systems  Review of systems not obtained due to patient factors. Intubated      Objective:     /61   Pulse 80   Temp 97.5 °F (36.4 °C) (Core)   Resp 21   Ht 5' 11\" (1.803 m)   Wt 289 lb 11 oz (131.4 kg)   SpO2 (!) 88%   BMI 40.40 kg/m²  IV drips of Nitroglycerin, Nicardipine and Milrinone.   UO 70-80 cc/hr    General:  Intubated, awakens easily but becomes agitated   Skin:  normal   Eyes: Subconjunctival hemmorrhage   Mouth: MMM no lesions   Lymph Nodes:  Cervical, supraclavicular, and axillary nodes normal.   Lungs:  Diffusely decreased   Heart:  regular rate and rhythm, S1, S2 normal, no murmur, click, rub or gallop   Abdomen: soft, non-tender; bowel sounds normal; no masses,  no organomegaly   CVA:  absent   Genitourinary: defer exam   Extremities:  extremities normal, atraumatic, no cyanosis or edema    Pulses:   R bruit  L bruit   2   carotid 2    2   brachial 2    2   radial 2    0   femoral 1    0   popliteal 1    0   posterior tibial 0    0   dorsalis pedis 2    na   bypass graft na      Easily audible monophasic R DP doppler signal     Neurologic:  Moves all 4 to command; no apparent R foot numbness   Psychiatric:  non focal       Labs reviewed  Creat 2.0 (admission 1.5)  CXR reviewed -

## 2020-03-06 NOTE — PROGRESS NOTES
Late entry due to patient care needs    1610- Patient admitted to CVU from [place] [address] and attached to ventilator and monitors. Report received from anesthesiologist.  Chest x-ray ordered. Labs drawn and sent. Assessment complete. Hemodymanics stable and will continue to monitor. PO2 noted to be low on ABG, patient currently with FiO2 100 %, PEEP 12. Will sit patient and reassess. Chest tubes with sanguinous drainage, appropriate output noted. Dillon catheter in place with urine output, will monitor closely. Dr Jose Cheung discussed concern for blood flow in RLE, no doppler signal appreciated DP or PT, foot is warm to touch. 1730- patient now awake, coughing against vent, opens eyes. Able to follow commands, but noted to be weak. Agitated with ETT. Versed and fentanyl given, bilateral wrist restraints place. Dr Jose Cheung notified as Dr Anne-Marie Madrigal in procedure at this time. He will come to evaluate patient. 1750-- Family at bedside, updated on progress since OR, as well as plan of care. Dr Anne-Marie Madrigal to bedside, discussed patient condition. Ok to begin weaning milrinone 1 mL every 4 hours as tolerated. Discussed goal for BP control. Will aim to keep SBP around 90, MAP 65.    1800- Now noted to have faint doppler signal in right foot DP and PT. Verified with Marianna Rachel CNP. Continues to have urine output as well. 1815- Received call from Dr Tracey Cuevas. Updated on patient condition. Discussed need for repeat CT, but will defer at this time due to respiratory instability. 1802- Report given to Fabian Briones RN to assume care. Handoff completed.

## 2020-03-06 NOTE — PROGRESS NOTES
Nutrition Assessment (Enteral Nutrition)    Type and Reason for Visit: Initial, Positive Nutrition Screen, Consult(Screen : vent; Consult: TF)    Nutrition Recommendations:   Start nutrition when appropriate  If TF: start Nepro formula at rate of 30 ml/hr + 4 bottles of Proteinex daily per feeding tube. Do not mix with tube feed. Flush per MD. Monitor for refeeding. Monitor renal lab values   Monitor need for TF rate adjustments r/t propofol use     Nutrition Assessment: Pt admitted d/t ascending aortic aneurysm, pt underwent emergency surgery. Pt remains intubated and sedated after surgery. Pt is on propofol at 30.9 ml/hr. Pt has no wt hx in EMR. He has hx of HTN. He currently is on no pressors. If starting TF, I recommend starting Nepro at goal rate of 30 ml/hr. Will monitor and adjust relating to propofol use. Malnutrition Assessment:  · Malnutrition Status: Insufficient data  · Context: Acute illness or injury  · Findings of the 6 clinical characteristics of malnutrition (Minimum of 2 out of 6 clinical characteristics is required to make the diagnosis of moderate or severe Protein Calorie Malnutrition based on AND/ASPEN Guidelines):    Nutrition Risk Level: High    Nutrition Needs:  · Estimated Daily Total Kcal: 5930-4501 kcal (15-18 kcal/kg CBW)  · Estimated Daily Protein (g): 117-156 g (1.5-2 g/kg IBW)  · Estimated Daily Fluid (ml/day): per MD    Nutrition Diagnosis:   · Problem: Inadequate oral intake  · Etiology: related to Impaired respiratory function-inability to consume food     Signs and symptoms:  as evidenced by NPO status due to medical condition, Intubation    Objective Information:  · Nutrition-Focused Physical Findings: Generalized +2 edema noted. No BM in EMR noted.    · Wound Type: Surgical Wound  · Current Nutrition Therapies:  · Oral Diet Orders: NPO   · Oral Diet intake: NPO  · Oral Nutrition Supplement (ONS) Orders: None  · ONS intake: NPO  · Tube Feeding (TF) :   · Goal TF &

## 2020-03-06 NOTE — CONSULTS
PATIENT IS SEEN AT THE REQUEST OF DR. Dierdre Soulier for vent management, high oxygen demand    CONSULTING PHYSICIAN: Lm    HISTORY OF PRESENT ILLNESS:  This is a 35 y.o. male who admitted to [facility] from ED after experiencing chest pain that was sudden in onset. He was diagnosed with Aortic Dissection (Type A). He was taken urgently to the OR on 3/5 for repair and was also noted to have type B dissection. Post-operatively he has a high oxygen requirement and remains on the vent. Established Pulmonologist:  None    PAST MEDICAL HISTORY:  Past Medical History:   Diagnosis Date    Aortic dissection ([address] 03/05/2020    HTN (hypertension)     Obesity     BMI 40    Tattoos     Vision loss of left eye     preop - partial loss per pt, previous L eye \"tube\"       PAST SURGICAL HISTORY:  Past Surgical History:   Procedure Laterality Date    AORTA SURGERY  03/05/2020    repair of retrograde dissection of ascending aorta, 30mm Hemashield tube graft    EYE SURGERY      left, some sort of tube    EYE SURGERY  03/05/2020    L lateral canthotomy    FRACTURE SURGERY Left     ankle. plates.  THORACIC AORTIC ANEURYSM REPAIR N/A 3/5/2020    EMERGENCY LATERAL CANTHOTOMY. TRANSESOPHAGEAL EHOCARDIOGRAM. TOTAL CARDIOPULMONARY BYPASS. WITH CIRCULATORY ARREST EMERGENCY TYPE A  AORTIC ANEURYSM REPAIR performed by Samantha Abdi MD at [address] [place] [address] Rd:  family history includes Diabetes in his mother and sister. SOCIAL HISTORY:   reports that he has been smoking cigarettes. He has been smoking about 0.50 packs per day.  He has never used smokeless tobacco.    Scheduled Meds:   heparin (porcine)  5,000 Units Subcutaneous BID    sodium chloride flush  10 mL Intravenous 2 times per day    ceFAZolin (ANCEF) IVPB  3 g Intravenous Q8H    vancomycin (VANCOCIN) IV  2,000 mg Intravenous Q12H    pantoprazole  40 mg Oral Daily    pantoprazole  40 mg Intravenous Daily    And    sodium chloride (PF)  10 mL Intravenous Daily    metoprolol tartrate  12.5 mg Oral BID    [START ON 3/7/2020] lisinopril  5 mg Oral Q24H    chlorhexidine  15 mL Mouth/Throat BID    furosemide  40 mg Intravenous BID    magnesium oxide  400 mg Oral BID    mupirocin   Nasal BID    nitroGLYCERIN  1 patch Transdermal Daily    potassium chloride  10 mEq Oral TID     atorvastatin  40 mg Oral Nightly    aspirin  325 mg Oral Daily    albuterol  2.5 mg Nebulization Q4H WA    insulin glargine  0.15 Units/kg Subcutaneous Nightly    [START ON 3/7/2020] insulin lispro  0-12 Units Subcutaneous TID     [START ON 3/7/2020] insulin lispro  0-6 Units Subcutaneous Nightly    sennosides-docusate sodium  1 tablet Oral BID    erythromycin   Left Eye BID       Continuous Infusions:   sodium chloride 50 mL/hr at 03/05/20 1615    sodium chloride      norepinephrine      nitroGLYCERIN 200 mcg/min (03/06/20 0613)    niCARdipine 5 mg/hr (03/06/20 0652)    insulin 4.7 Units/hr (03/06/20 0510)    dextrose      milrinone 0.1 mcg/kg/min (03/06/20 0303)    propofol 20 mcg/kg/min (03/06/20 0605)       PRN Meds:  sodium chloride flush, potassium chloride, magnesium sulfate, calcium chloride IVPB, calcium chloride IVPB, acetaminophen, oxyCODONE **OR** oxyCODONE, fentanNYL, midazolam, ondansetron, metoclopramide, hydrALAZINE, metoprolol, albuterol sulfate HFA, albumin human, sodium chloride, norepinephrine, nitroGLYCERIN, niCARdipine, glucose, dextrose, glucagon (rDNA), dextrose, milrinone    ALLERGIES:  Patient is allergic to ibuprofen. REVIEW OF SYSTEMS:  Unable to obtain     PHYSICAL EXAM:  Blood pressure 125/61, pulse 80, temperature 97.5 °F (36.4 °C), temperature source Core, resp. rate 21, height 5' 11\" (1.803 m), weight 289 lb 11 oz (131.4 kg), SpO2 (!) 88 %.'  Gen: Sedated on vent, responds to stimuli  Eyes: No sclera icterus. No conjunctival injection. ENT: No discharge. Pharynx with ETT and NG. Neck: Trachea midline. No obvious mass.     Resp: and showed emphysema, atelectasis, aortic dissection     I reviewed all the above labs and studies pertaining to this visit. ASSESSMENT:  · Acute Hypoxic Respiratory Failure with saturations less than 90% on room air  · Emphysema  · Type A and Type B aortic Dissection  · JEYSON   · Tobacco Abuse     PLAN:  · Full vent support. Decrease Vt more towards a ideal body weight based value (~420 ml) and change mode to VC+. Will increase inspiratory time to help oxygenation. PEEP at 12. · ABG in one hour  · Daily ABG  · Change albuterol to duonebs q 4 hours   · Likely needs more diuresis   · Goal would be to keep heavily sedated to allow for vent synchrony. · BP parameters per surgery  · GI/DVT prophylaxis  · Glycemic control  · Drug screen  · DVT prophylaxis with heparin      Critical care time of 31 minutes. This does not include procedural time. Please see procedural notes for details.     Otis Villegas DO  [facility]

## 2020-03-06 NOTE — PLAN OF CARE
Problem: Restraint Use - Nonviolent/Non-Self-Destructive Behavior:  Goal: Absence of restraint indications  Description  Absence of restraint indications  3/6/2020 1240 by Catherine Salmeron RN  Outcome: Ongoing  Note:   A: Assess need for continued use. Assess for restraint related injury. Provide assistance with elimination, nutrition, and ROM. Educate patient/family re: criteria for removal.    3/6/2020 0757 by Dot Curran RN  Outcome: Ongoing  Goal: Absence of restraint-related injury  Description  Absence of restraint-related injury  3/6/2020 1240 by Catherine Salmeron RN  Outcome: Ongoing  3/6/2020 0757 by Dot Curran RN  Outcome: Ongoing     Problem: Discharge Planning:  Goal: Discharged to appropriate level of care  Description  Discharged to appropriate level of care  Outcome: Ongoing  Note:   A: Will assess for potential needs at discharge. Will consult SW as needed. Problem: Cardiac Output - Decreased:  Goal: Cardiac output within specified parameters  Description  Cardiac output within specified parameters  Outcome: Ongoing  Note:   A: Monitor hemodynamics via PA catheter, CVP, ABP, vigilance cardiac output monitor. Titrate gtts/administer fluids per protocol orders to keep hemodynamics within ordered parameters. Collaborate with physician and communicate when meets criteria to transition. Goal: Hemodynamic stability will improve  Description  Hemodynamic stability will improve  Outcome: Ongoing     Problem: Fluid Volume - Imbalance:  Goal: Ability to achieve a balanced intake and output will improve  Description  Ability to achieve a balanced intake and output will improve  Outcome: Ongoing  Note:   A: Strict I/O, daily weight, assess for edema. Educate patient and family. Goal: Chest tube drainage is within specified parameters  Description  Chest tube drainage is within specified parameters  Outcome: Ongoing  Note:   A: CT to suction as ordered.  Assess for air leak and Will show no signs or symptoms of venous thromboembolism  Description  Will show no signs or symptoms of venous thromboembolism  Outcome: Ongoing  Note:   A: Assess and educate for signs/symptoms of VTE, PE or bleeding. Compression stockings, sequential compression device, promote progress activity plan. Goal: Absence of signs or symptoms of impaired coagulation  Description  Absence of signs or symptoms of impaired coagulation  Outcome: Ongoing  Note:   A: Assess and educate for signs/symptoms of excessive bleeding, impaired coagulation       Problem: Infection - Central Venous Catheter-Associated Bloodstream Infection:  Goal: Will show no infection signs and symptoms  Description  Will show no infection signs and symptoms  Outcome: Ongoing  Note:   A: Assess surgical site and educate for sign/symptoms of infection. Problem: Urinary Elimination:  Goal: Complications related to the disease process, condition or treatment will be avoided or minimized  Description  Complications related to the disease process, condition or treatment will be avoided or minimized  Outcome: Ongoing  Note:   A: Assess need for continued use. Assess and educate for signs/symptoms of infection. Monitor quality and quantity of urine output. Stat lock in place, drainage bag hanging below level of bladder, routine catheter care. Problem: Infection - Surgical Site:  Goal: Will show no infection signs and symptoms  Description  Will show no infection signs and symptoms  Outcome: Ongoing  Note:   A: Assess surgical site and educate for sign/symptoms of infection. Problem: Tobacco Use:  Goal: Inpatient tobacco use cessation counseling participation  Description  Inpatient tobacco use cessation counseling participation  Outcome: Ongoing  Note:   A: Smoking cessation education       Problem:  Activity Intolerance:  Goal: Able to perform prescribed physical activity  Description  Able to perform prescribed physical

## 2020-03-06 NOTE — PROGRESS NOTES
Progress Note    S/P repair aortic dissection 3/5/20    Vital Signs:                                                 /61   Pulse 80   Temp 97.5 °F (36.4 °C) (Core)   Resp 21   Ht 5' 11\" (1.803 m)   Wt 289 lb 11 oz (131.4 kg)   SpO2 (!) 88%   BMI 40.40 kg/m²  O2 Flow Rate (L/min): 6 L/min   NSR  CVP (Mean): 23 mmHg  PAP: 43/29  PAP (Mean): 34 mmHg  SVR (Using ABP Mean): 1028.57 dyne*sec/cm5  CCI: 2 L/min  SVO2 (%): 59 %  Admission Weight: 279 lb 15.8 oz (127 kg)      Vent Settings:  Vent Information  $Ventilation: $Initial Day  Ventilator Started: Yes  Skin Assessment: Clean, dry, & intact  Equipment ID: 3  Vent Type: 840  Vent Mode: AC/VC  Vt Ordered: 550 mL  Rate Set: 21 bmp  Peak Flow: 60 L/min  Pressure Support: 0 cmH20  FiO2 : 100 %  Sensitivity: 3  PEEP/CPAP: 12  Cuff Pressure (cm H2O): 28 cm H2O  Humidification Source: Heated wire  Humidification Temp: 37  Humidification Temp Measured: 36.9     Drips:  Prop, mil 0.1, insulin, ntg, nicardipine    I/O:      Intake/Output Summary (Last 24 hours) at 3/6/2020 0651  Last data filed at 3/6/2020 0600  Gross per 24 hour   Intake 4016.2 ml   Output 1861 ml   Net 2155.2 ml     Chest Tube: 77, 93    CV: reg, wound c/d/i  Pulm: decreased  Abd: soft  Ext: warm, 1+ edema. RLE dopp PT/DP    Data Review:  CBC:   Recent Labs     03/05/20  1440  03/05/20  1610  03/05/20  1912 03/05/20  2350 03/06/20  0410   WBC 20.1*  --  12.7*  --   --   --  12.3*   HGB 13.8   < > 12.5*   < > 14.4 13.5 12.5*   HCT 42.8  --  38.5*  --   --   --  38.8*   MCV 87.5  --  87.3  --   --   --  86.9     --  167  --   --   --  155    < > = values in this interval not displayed.      BMP:   Recent Labs     03/05/20  0700 03/05/20  1610 03/05/20  1928 03/06/20  0410    139  --  140   K 3.1* 3.3* 3.9 3.6   CL 97* 94*  --  100   CO2 22 24  --  25   BUN 18 19  --  24*   CREATININE 1.5* 1.7*  --  2.0*   CALCIUM 9.5 8.4  --  8.9   MG  --  2.60*  --  2.40     Cardiac Enzymes:   Recent Labs     03/05/20  0700   TROPONINI 0.19*     PT/INR:   Recent Labs     03/05/20  1440 03/05/20  1610 03/06/20  0410   PROTIME 19.6* 8.5* 12.2   INR 1.68* 0.74* 1.05     APTT:   Recent Labs     03/05/20  1440 03/05/20  1610 03/06/20  0410   APTT 34.2 30.5 26.0       Assessment/Plan:  CV - SR. Wean off milrinone. - antihypertensives. Start po meds per tube. vasc - suspect intrabd primary tear. RLE warm, not threatened. Eventual abd imaging when can tolerate  pulm - intubated. Poor po2, cxr unrevealing. Consult pulmonary. Renal - Cr 2. U/o adequate. Consult nephrology. - retain Dillon for accurate I+O  Heme - acute blood loss anemia. Stable.    - scds, sc hep    Lesia Beckwith MD  3/6/2020  6:51 AM

## 2020-03-06 NOTE — PROGRESS NOTES
ADMINISTRATIVE NOTE:  Chart being merged and I wanted to add some details. I was notified yesterday during the emergency cardiovascular surgery for this patient and the additional evolving urgent/emergent ophthalmic situation in OR by Ms. Kenneth and Dr Sixto Mcduffie. Appropriate calls were made. No Ophthalmologist was available to come to help. I approved that Dr. Sixto Mcduffie do the emergent, eye saving, lateral canthotomy. The patient was also in life threatening cardiovascular situation and not movable to a facility with ophthalmology emergent services. Solitario Wise MD, [facility], . [address], [address]    Cell [phone]  Office [phone]  3/6/2020  1:00 PM

## 2020-03-06 NOTE — PROGRESS NOTES
Late Entry:  @ 0730 Bedside handoff with DIVINA Thurman RN. Pt repositioned. Pt moving all extremities, reaches for ETT when restraints released. Increased RR with stimulation. Will titrate Propofol for ordered RASS, see eMAR. Noted orders for nephrology and pulmonary consults, unit clerk placing calls for consults. @ 0740 RASS +2, CPOT 6; PRN doses Fentanyl and Versed administered, see eMAR and flowsheets. @ 0800 Assessment completed. Note no blood return, unable to flush left radial arterial line. Mary Ellen Parham CNP informed @ bedside. Order received for additional 1mg IVP Versed for [facility] placement. KANA Jolly CNP inserted small bore feeding tube into left nare without difficulty, no respiratory distress, insertion mateusz 78cm. Portable xray to be ordered to confirm placement. @ 0807 NTG patch applied, Begin weaning NTG gtt and cardene gtt to keep SBP in ordered parameters, see eMAR.  @ 0815 Dr Anthony Jacobs CNP, pharmacist and dietician at bedside for critical care rounds. Status reviewed, questions answered, plan of care discussed. Vent settings changed by Dr Cheryle Bogaert, A/C 20, , Peep 10, FiO2 100%. New orders noted. @ 0900 Fentanyl gtt started at this time, see eMAR.  @ 7093 Radiology tech at bedside for portable CXR  @ 4003 Urine specimen obtained via specimen port on indwelling catheter and sent to lab. Pt's mom and significant other(S.O.) arrived to bedside. Updates provided. Family requesting limited visitors and phone calls. Set up password code [employer]), unit clerk notifiied of password.  @ 0930 ABGs drawn per right brachial arterial line without difficulty and sent to lab.  @ 0940 Pt's mom and S.O. leaving bedside. @ 3503 Reviewed cxr report for keofeed placement with Mary Ellen Parham CNP in department. OK to use. Oral medications administered via keofeed.   @ 1006 Perfect serve message sent to critical care with ABG results  @ 1020 Complete bed linen change, maxisky lift pad place under patient. Pt tolerated activity poorly, with O2 desaturation 84% on vent FiO2 100%. Received call from Donta Merrill CNP after reviewing ABGS. Requested peripheral stick ABG to be sent. Call placed to RT.  @ 1030 RT at bedside for peripheral stick ABG. @ 1103 Perfect serve message sent to critical care with peripheral stick ABG results. @ 1120 Dr Fariba Del Angel to bedside for nephrology rounds. Status reviewed, questions answered, plan of care discussed. @ 1134 RT at bedside for treatment  @ 1145 Pt SpO2 has now recovered to 89% after earlier activity in bed with linen change. @ 1200 Urine tox screen resulted, critical care notified via perfect serve. Urine output 30ml/hr  @ 1238 Noted new vent setting orders in chart. A/C 20, , Peep 12, FiO2 100%. Peep increased to 12, call placed to RT.  @ 1245 Pt's mom and S.O. returned to bedside. @ 1310 Milrinone gtt weaned off at this time. @ 1425 NIBP obtained LUE. Left cuff Systolic BP 20 points higher then right radial systolic arterial pressure. MAP similar. @ 1 Dr Felicita Gilbert and Donta Merrill CNP return to bedside for status assessment. S.O. at bedside for rounds. Urine output 30ml/hr  @ 1526 Dr Felicita Gilbert discussed status with Dr Fariba Del Angel. Orders received for IVP Lasix once. @ 1547 Nepro TF started at 10ml/hr  @ 1555 Dr Felicita Gilbert leaving bedside. See order for new vent settings. RT at bedside and aware of changes. ABG ordered for 1700.  @ 1619 Lasix gtt started at this time. @ 1630 Urine output increasing, see flowsheet. @ [address] Dr Felicita Gilbert and Donta Merrill CNP to bedside for insertion of temporary dialysis catheter. Witnessed consent obtained from pt's mom in waiting room. @ R [address] 119 cath inserted right groin. Pt's mom and S. O. brought to bedside to speak with Dr Felicita Gilbert. @ 1706 ABGs drawn per right brachial arterial line without difficulty and send to lab  @ 1715 Noted S.O. recording discussion. Hospital policy explained, that pictures and recordings are not permitted.  Pt's mom

## 2020-03-06 NOTE — PLAN OF CARE
Problem: Nutrition  Goal: Optimal nutrition therapy  Outcome: Ongoing   Nutrition Problem: Inadequate oral intake  Intervention: Food and/or Nutrient Delivery: Continue NPO(start nutrition when appropriate)  Nutritional Goals:  Tolerate most appropriate form of nutrition this admission

## 2020-03-07 NOTE — PROGRESS NOTES
2. 3* 2.4*   CALCIUM 8.4  --  8.9 8.3 8.5   MG 2.60*  --  2.40  --  2.60*    < > = values in this interval not displayed. Cardiac Enzymes:   Recent Labs     03/05/20  0700   TROPONINI 0.19*     PT/INR:   Recent Labs     03/05/20  1610 03/06/20  0410 03/07/20  0406   PROTIME 8.5* 12.2 12.0   INR 0.74* 1.05 1.03     APTT:   Recent Labs     03/05/20  1610 03/06/20  0410 03/07/20  0406   APTT 30.5 26.0 23.8*       Assessment/Plan:  CV - SR. Remove swan. - antihypertensives. po meds per tube - increase BB.  vasc - suspect intrabd primary tear. RLE warm, not threatened. Eventual abd imaging when can tolerate  pulm - intubated. Oxygenation improving with crrt. Renal - JEYSON. crrt   - retain Dillon for accurate I+O  GI - TR. Kaofeed tip postpyloric  Heme - acute blood loss anemia. Stable.    - scds, sc hep  ophtho - lat canthotomy, L eye drops bid      Tera Haddad MD  3/7/2020  7:49 AM

## 2020-03-07 NOTE — PROGRESS NOTES
place.  Chest tube. Left side airspace disease with cardiomegaly    AB.4    Cultures:  None    I reviewed the labs and images listed above    Assessment:   · Acute Hypoxic Respiratory Failure with saturations less than 90% on room air  · Possible intra-pulmonary vs intra-cardiac shunting  · Emphysema  · Type B dissection  · Polysubstance abuse  · G6PD per family      Plan:  · Full vent support. Has not been PEEP responsive per se and lung is very compliant. Goal saturation is 88% and/or PaO2 of 55 (ARDs net). He has achieved his minimal PaO2 requirements over last 2 ABGs  · Serial ABGs q 4 hours  · ECHO with bubble study for shunt evaluation   · Need to remain heavily sedated to allow vent synchrony. I will consider paralytic but that will interfere with any peripheral neurological exam  · RRT per nephro but recommend removing as much fluid per hour to facilitate oxygenation  · Duonebs q 4 hours  · GI/DVT prophylaxis   · Glycemic control  · Watch for meds that could cause hemolytic anemia with his G6P deficiency       Family was recording me (as I was updating them) yesterday without my knowledge     His peripheral oxygen demands are very low, therefore he has been able to tolerate low saturations without adverse effects (so far). He would be difficult to prone in lieu of CRRT, weight. etc    Critical care time of 32 minutes. This does not include procedural time. Please see procedural notes for details.     DO Humaira Guadalupe [facility]

## 2020-03-07 NOTE — PROGRESS NOTES
Department of Internal Medicine  Nephrology Progress Note    SUBJECTIVE:  We are following this patient for mynor . Patient progress reviewed. HPI. He is a 43-year-old -American  gentleman with a past medical history significant for obesity,  hypertension, admitted with chest pain. The patient had emergent CT  scan of the chest with IV contrast that showed type A thoracic  dissection extending down and involving the three major branches of the  aortic arch. It was also extending down to the descending thoracic  aorta, the true lumen involves both celiac and superior mesenteric  arteries. The patient was taken to the OR and had a repair done, and  also noted to have type B dissection. Postoperatively, the patient  remained vent-dependent. At the time of presentation, he had a  creatinine of 1.5 that had risen to 2.0. Renal consultation has been called . Events noted , on CRRT for low UOP and difficulty with O2 saturation . Sedated / on vent   REVIEW OF SYSTEMS:  On vent     Physical Exam:    VITALS:  BP 93/72   Pulse 90   Temp 97.9 °F (36.6 °C) (Axillary)   Resp 15   Ht 5' 11\" (1.803 m)   Wt 290 lb 9.1 oz (131.8 kg)   SpO2 92%   BMI 40.53 kg/m²   24HR INTAKE/OUTPUT:      Intake/Output Summary (Last 24 hours) at 3/7/2020 1605  Last data filed at 3/7/2020 1550  Gross per 24 hour   Intake 2858.18 ml   Output 4068 ml   Net -1209.82 ml       Constitutional:  On vent /sedated   Respiratory:  Decrease  BS at bases.  Doreatha Tae   Gastrointestinal:  Distended , hypo active  Bowel Sounds  Cardiovascular:  S1, S2 RRR   Edema:  Trace edema    DATA:    CBC:  Lab Results   Component Value Date    WBC 22.1 03/07/2020    RBC 4.38 03/07/2020    HGB 12.2 03/07/2020    HCT 38.4 03/07/2020    MCV 87.7 03/07/2020    MCH 27.8 03/07/2020    MCHC 31.8 03/07/2020    RDW 13.7 03/07/2020     03/07/2020    MPV 8.7 03/07/2020     CMP:  Lab Results   Component Value Date     03/07/2020    K 4.7 03/07/2020 CL 97 03/07/2020    CO2 25 03/07/2020    BUN 33 03/07/2020    CREATININE 3.0 03/07/2020    GFRAA 28 03/07/2020    LABGLOM 23 03/07/2020    GLUCOSE 127 03/07/2020    PROT 6.4 03/07/2020    CALCIUM 8.5 03/07/2020    BILITOT 0.8 03/07/2020    ALKPHOS 49 03/07/2020     03/07/2020    ALT 54 03/07/2020      Hepatic Function Panel:   Lab Results   Component Value Date    ALKPHOS 49 03/07/2020    ALT 54 03/07/2020     03/07/2020    PROT 6.4 03/07/2020    BILITOT 0.8 03/07/2020    BILIDIR <0.2 03/07/2020    IBILI see below 03/07/2020      Phosphorus:   Lab Results   Component Value Date    PHOS 4.9 03/07/2020       ASSESSMENT:  Active Problems:    Ascending aortic aneurysm (HCC)    Dissection of thoracic aorta (HCC)    Dissecting aneurysm of thoracic aorta, [place] type A (HCC)    Acute respiratory failure with hypoxia (HCC)    Centrilobular emphysema (HCC)    JEYSON (acute kidney injury) ([facility] [address]  Resolved Problems:    * No resolved hospital problems. *      PLAN:  1. JEYSON Most likely ATN from JOHNNY. 2. On CRRT, UF  ml/hr . Keep SBP> 90 mmHg . 3. HTN controlled. 4. Resp failure post op on vent . 5. Type A aortic dissection S/p repair the patient also has Type B will need intervention . 6. Vascular  on board. 7. Meds reviewed and adjusted. Prognosis guarded to poor.      Katy Arriaga MD,FACP

## 2020-03-08 NOTE — PROGRESS NOTES
Progress Note    S/P repair aortic dissection 3/5/20    Vital Signs:                                                 /71   Pulse 93   Temp 98 °F (36.7 °C) (Axillary)   Resp 15   Ht 5' 11\" (1.803 m)   Wt 287 lb 4.2 oz (130.3 kg)   SpO2 94%   BMI 40.06 kg/m²  O2 Flow Rate (L/min): 6 L/min   NSR  Admission Weight: 279 lb 15.8 oz (127 kg)      Vent Settings:  Vent Information  $Ventilation: $Subsequent Day  Ventilator Started: Yes  Skin Assessment: Clean, dry, & intact  Equipment ID: 03  Vent Type: 840  Vent Mode: AC/PC  Vt Ordered: 0 mL  Pressure Ordered: 17  Rate Set: 15 bmp  Peak Flow: 0 L/min  Pressure Support: 0 cmH20  FiO2 : 70 %  Sensitivity: 3  PEEP/CPAP: 13  I Time/ I Time %: 2.6 s  Cuff Pressure (cm H2O): 28 cm H2O  Humidification Source: Heated wire  Humidification Temp: 37  Humidification Temp Measured: 36.7  Circuit Condensation: Drained     Drips:  Prop, fent, insulin, ntg    I/O:      Intake/Output Summary (Last 24 hours) at 3/8/2020 3528  Last data filed at 3/8/2020 0700  Gross per 24 hour   Intake 1758 ml   Output 3042 ml   Net -1284 ml     Chest Tube: 30, 90, 40    CV: reg, wound c/d/i  Pulm: decreased at bases  Abd: soft  Ext: warm, 1+ edema. BLE dopp PT/DP    Data Review:  CBC:   Recent Labs     03/07/20  0406 03/07/20  2200 03/08/20  0511   WBC 22.1* 18.2* 14.1*   HGB 12.2* 11.0* 10.8*   HCT 38.4* 35.2* 33.7*   MCV 87.7 89.2 88.4    113* 107*     BMP:   Recent Labs     03/06/20  0410  03/07/20  0406  03/07/20  1805 03/08/20  0001 03/08/20  0511      < > 131*   < > 137 138 139   K 3.6   < > 3.7   < > 4.3 4.2 3.8      < > 94*   < > 103 104 104   CO2 25   < > 25   < > 22 23 23   PHOS 4.2  --  4.9  --   --   --  3.4   BUN 24*   < > 32*   < > 32* 34* 34*   CREATININE 2.0*   < > 2.4*   < > 3.0* 2.8* 2.7*   CALCIUM 8.9   < > 8.5   < > 8.2* 8.1* 8.2*   MG 2.40  --  2.60*  --   --   --  2.60*    < > = values in this interval not displayed.      Cardiac Enzymes:

## 2020-03-08 NOTE — PROGRESS NOTES
03/08/2020    K 4.3 03/08/2020     03/08/2020    CO2 23 03/08/2020    BUN 37 03/08/2020    CREATININE 2.4 03/08/2020    GFRAA 36 03/08/2020    LABGLOM 30 03/08/2020    GLUCOSE 115 03/08/2020    PROT 6.0 03/08/2020    CALCIUM 8.3 03/08/2020    BILITOT 0.9 03/08/2020    ALKPHOS 53 03/08/2020     03/08/2020    ALT 29 03/08/2020      Hepatic Function Panel:   Lab Results   Component Value Date    ALKPHOS 53 03/08/2020    ALT 29 03/08/2020     03/08/2020    PROT 6.0 03/08/2020    BILITOT 0.9 03/08/2020    BILIDIR 0.3 03/08/2020    IBILI 0.6 03/08/2020      Phosphorus:   Lab Results   Component Value Date    PHOS 3.4 03/08/2020       ASSESSMENT:  Active Problems:    Ascending aortic aneurysm (HCC)    Dissection of thoracic aorta (HCC)    Dissecting aneurysm of thoracic aorta, [place] type A (HCC)    Acute respiratory failure with hypoxia (HCC)    Centrilobular emphysema (HCC)    JEYSON (acute kidney injury) ([facility] [address]  Resolved Problems:    * No resolved hospital problems. *      PLAN:  1. JEYSON Most likely ATN from JOHNNY. 2. On CRRT, UF  ml/hr . Keep SBP> 90 mmHg . No change in orders . 3. HTN controlled. 4. Resp failure  on vent . Plan per Pul .   5. Type A aortic dissection S/p repair the patient also has Type B will need intervention . 6. Vascular  on board. 7. Dw nurse.      Amrita Lloyd MD,FACP

## 2020-03-09 NOTE — CARE COORDINATION
LSW reviewed chart. Pt continues to be vented. No Insurance, NO pcp. Social Work Team following for Kourtney.   [place], [place]     Case Management   [phone]    3/9/2020  10:29 AM

## 2020-03-09 NOTE — PROGRESS NOTES
Department of Internal Medicine  Nephrology Progress Note    SUBJECTIVE:  We are following this patient for JEYSON . Patient progress reviewed. HPI: He is a 67-year-old -American gentleman with a past medical history significant for obesity,  hypertension, admitted with chest pain. The patient had emergent CT scan of the chest with IV contrast that showed type A thoracic dissection extending down and involving the three major branches of the aortic arch. It was also extending down to the descending thoracic aorta, the true lumen involves both celiac and superior mesenteric arteries. The patient was taken to the OR and had a repair done 3/5/20, and also noted to have type B dissection. Postoperatively, the patient remained vent-dependent. At the time of presentation, he had a creatinine of 1.5 that had risen to 2.0. Renal consultation has been called .       Subjective:  Resting in bed; sedated; intubated - requiring Vent support      Physical Exam:    VITALS:  /72   Pulse 92   Temp 98.1 °F (36.7 °C) (Axillary)   Resp 24   Ht 5' 11\" (1.803 m)   Wt 284 lb 13.4 oz (129.2 kg)   SpO2 100%   BMI 39.73 kg/m²   24HR INTAKE/OUTPUT:      Intake/Output Summary (Last 24 hours) at 3/9/2020 0958  Last data filed at 3/9/2020 0900  Gross per 24 hour   Intake 1582.8 ml   Output 4282 ml   Net -2699.2 ml       Constitutional:  On vent / sedated   Respiratory:  CTA anteriorly  Gastrointestinal:  Distended, hypoactive bowel Sounds  Cardiovascular:  S1, S2 RRR   Edema:  Trace edema    Medications:   metoprolol tartrate  25 mg Oral BID    heparin (porcine)  5,000 Units Subcutaneous BID    aspirin  325 mg Oral Daily    sennosides  5 mL Per NG tube BID    ipratropium-albuterol  1 ampule Inhalation Q4H    sodium chloride flush  10 mL Intravenous 2 times per day    pantoprazole  40 mg Intravenous Daily    And    sodium chloride (PF)  10 mL Intravenous Daily    chlorhexidine  15 mL Mouth/Throat BID    mupirocin   Nasal BID    erythromycin   Left Eye BID       DATA:    CBC:  Lab Results   Component Value Date    WBC 14.4 03/09/2020    RBC 3.65 03/09/2020    HGB 10.4 03/09/2020    HCT 32.1 03/09/2020    MCV 87.9 03/09/2020    MCH 28.4 03/09/2020    MCHC 32.4 03/09/2020    RDW 13.8 03/09/2020     03/09/2020    MPV 9.5 03/09/2020     CMP:  Lab Results   Component Value Date     03/09/2020    K 4.1 03/09/2020     03/09/2020    CO2 21 03/09/2020    BUN 42 03/09/2020    CREATININE 2.4 03/09/2020    GFRAA 36 03/09/2020    LABGLOM 30 03/09/2020    GLUCOSE 89 03/09/2020    PROT 6.0 03/09/2020    CALCIUM 8.4 03/09/2020    BILITOT 1.2 03/09/2020    ALKPHOS 48 03/09/2020     03/09/2020    ALT 25 03/09/2020        Phosphorus:   Lab Results   Component Value Date    PHOS 2.2 03/09/2020       ASSESSMENT:  Active Problems:    Ascending aortic aneurysm (HCC)    Dissection of thoracic aorta (HCC)    Dissecting aneurysm of thoracic aorta, [place] type A (HCC)    Acute respiratory failure with hypoxia (HCC)    Centrilobular emphysema (HCC)    JEYSON (acute kidney injury) ([facility] [address]  Resolved Problems:    * No resolved hospital problems. *      PLAN:  1. JEYSON - Most likely ATN / JOHNNY - On CRRT - Keep SBP> 90 mmHg - UF goal 100 ml/hr. No change in prescription  2. HTN - controlled. 3. Resp failure - on vent   4. Type A aortic dissection - S/p repair - the patient also has Type B - will need intervention .        Wilfredo Bhat MD

## 2020-03-09 NOTE — PROGRESS NOTES
Per MD Herrera Netters increased to 16 bpm and inspiratory time lengthened to 2.5 sec.     Electronically signed by Gene Young RCP on 3/9/2020 at 1:18 AM

## 2020-03-09 NOTE — PLAN OF CARE
cardiac rhythm monitor. Initiate arrhythmia protocol orders for Amiodarone if patient converts to arterial fib. Problem: Infection - Ventilator-Associated Pneumonia:  Goal: Prevent a ventilator associated event (VAE)  Description: Prevent a ventilator associated event (VAE)  Outcome: Ongoing  Note: A: Assess readiness to wean. Assess and communicate tolerance of ventilator settings; Collaborate with RT and pulmonologist.  RT to monitor and manage cuff pressure. Assess for signs/symptoms of infection. HOB 30 degrees. Routine oral care. Suction as needed, assess quality and quantity of secretions. GI prophylaxis as ordered. Manage sedation as ordered to keep RASS -2 to -3. Goal: Absence of pulmonary infection  Description: Absence of pulmonary infection  Outcome: Ongoing     Problem: Venous Thromboembolism:  Goal: Will show no signs or symptoms of venous thromboembolism  Description: Will show no signs or symptoms of venous thromboembolism  Outcome: Ongoing  Note: A: Assess and educate for signs/symptoms of VTE, PE or bleeding. Compression stockings, sequential compression device, promote progress activity plan. Goal: Absence of signs or symptoms of impaired coagulation  Description: Absence of signs or symptoms of impaired coagulation  Outcome: Ongoing  Note: A: Assess and educate for signs/symptoms of excessive bleeding, impaired coagulation       Problem: Infection - Central Venous Catheter-Associated Bloodstream Infection:  Goal: Will show no infection signs and symptoms  Description: Will show no infection signs and symptoms  Outcome: Ongoing  Note: A: Assess surgical site and educate for sign/symptoms of infection.          Problem: Urinary Elimination:  Goal: Complications related to the disease process, condition or treatment will be avoided or minimized  Description: Complications related to the disease process, condition or treatment will be avoided or minimized  Outcome: Ongoing  Note: A: Assess need for continued use. Assess and educate for signs/symptoms of infection. Monitor quality and quantity of urine output. Stat lock in place, drainage bag hanging below level of bladder, routine catheter care. Problem: Infection - Surgical Site:  Goal: Will show no infection signs and symptoms  Description: Will show no infection signs and symptoms  Outcome: Ongoing  Note: A: Assess surgical site and educate for sign/symptoms of infection. Problem: Falls - Risk of:  Goal: Will remain free from falls  Description: Will remain free from falls  Outcome: Ongoing  Note: A: Sound com dome light, fall risk arm band, bed/chair alarm, bed in lowest position, wheels of bed locked,fall precaution education, intentional rounding. PT/OT eval/treat when appropriate    Goal: Absence of physical injury  Description: Absence of physical injury  Outcome: Ongoing     Problem:  Activity Intolerance:  Goal: Able to perform prescribed physical activity  Description: Able to perform prescribed physical activity  Outcome: Not Met This Shift  Note: D: Pt intubated with CRRT in progress on bedrest.  Goal: Ability to tolerate increased activity will improve  Description: Ability to tolerate increased activity will improve  Outcome: Not Met This Shift

## 2020-03-09 NOTE — PROGRESS NOTES
VASCULAR    Seen for type B aortic dissection. Remains on the vent - heavily sedated but not paralyzed. -120 (no antiHTN drips) 's UO  cc/hr on CRRT  Vent 50% with peep 10  Abd soft, nontender  Palpable B DP pulse - warm feet    Labs reviewed    A/P: Aortic dissection type B S/P ascending aortic replacement   Slowly improving with better oxygenation, decreased PEEP and improved R foot perfusion. Will continue to follow and consider CTA chest/abd/pelvis as he improves.      Derl Brinklow

## 2020-03-09 NOTE — PROGRESS NOTES
Pulmonary Progress Note    Date of Admission: 3/5/2020   LOS: 4 days       CC:  dissection    Subjective:  significant issus with hypoxemia. Vent changes. Type B dissection          ROS:   Unable to assess     PHYSICAL EXAM:   Blood pressure 106/72, pulse 89, temperature 99 °F (37.2 °C), temperature source Axillary, resp. rate 16, height 5' 11\" (1.803 m), weight 284 lb 13.4 oz (129.2 kg), SpO2 100 %.'  Gen: No distress. Eyes: PERRL. No sclera icterus. No conjunctival injection. ENT: No discharge. Pharynx clear. External appearance of ears and nose normal.  Neck: Trachea midline. No obvious mass. Resp: No accessory muscle use. No crackles. No wheezes. No rhonchi. Chest tubes. CV: Regular rate. Regular rhythm. No murmur or rub. No edema. GI: Non-tender. Non-distended. No hernia. Skin: Warm, dry, normal texture and turgor. No nodule on exposed extremities. Lymph: No cervical LAD. No supraclavicular LAD. M/S: No cyanosis. No clubbing. No joint deformity.     Neuro:    Psych: Sedated      Medications:    Scheduled Meds:   metoprolol tartrate  25 mg Oral BID    heparin (porcine)  5,000 Units Subcutaneous BID    aspirin  325 mg Oral Daily    sennosides  5 mL Per NG tube BID    ipratropium-albuterol  1 ampule Inhalation Q4H    sodium chloride flush  10 mL Intravenous 2 times per day    pantoprazole  40 mg Intravenous Daily    And    sodium chloride (PF)  10 mL Intravenous Daily    chlorhexidine  15 mL Mouth/Throat BID    mupirocin   Nasal BID    erythromycin   Left Eye BID       Continuous Infusions:   midazolam 1 mg/hr (03/07/20 2320)    fentaNYL 150 mcg/hr (03/09/20 1123)    insulin 6.6 Units/hr (03/09/20 1211)    prismaSATE BGK 4/2.5 1,000 mL/hr (03/09/20 1015)    prismaSATE BGK 4/2.5 500 mL/hr (03/09/20 1015)    prismaSATE BGK 4/2.5 500 mL/hr (03/09/20 1015)    sodium chloride      norepinephrine Stopped (03/08/20 0130)    nitroGLYCERIN Stopped (03/07/20 1116)    niCARdipine Stopped (03/07/20 5788)    dextrose      propofol 40 mcg/kg/min (03/09/20 1044)       PRN Meds:  midazolam, fentanNYL, perflutren lipid microspheres, sodium chloride, potassium chloride, magnesium sulfate, sodium phosphate IVPB **OR** sodium phosphate IVPB **OR** sodium phosphate IVPB **OR** sodium phosphate IVPB, calcium gluconate-NaCl **OR** calcium gluconate IVPB **OR** calcium gluconate IVPB **OR** calcium gluconate IVPB, sodium chloride flush, acetaminophen, oxyCODONE **OR** oxyCODONE, ondansetron, metoclopramide, hydrALAZINE, metoprolol, albuterol sulfate HFA, albumin human, sodium chloride, norepinephrine, nitroGLYCERIN, niCARdipine, glucose, dextrose, glucagon (rDNA), dextrose    Labs reviewed:  CBC:   Recent Labs     03/08/20 2355 03/09/20 0602 03/09/20  1120   WBC 19.1* 14.4* 13.4*   HGB 12.0* 10.4* 10.1*   HCT 38.3* 32.1* 31.4*   MCV 89.9 87.9 87.8   * 105* 103*     BMP:   Recent Labs     03/08/20 0511 03/08/20 2015 03/08/20 2355 03/09/20 0602 03/09/20  1120      < > 137 135* 136 137   K 3.8   < > 4.2 4.9 4.1 4.0      < > 99 100 104 105   CO2 23   < > 25 23 21 21   PHOS 3.4  --  4.7  --  2.2*  --    BUN 34*   < > 36* 40* 42* 43*   CREATININE 2.7*   < > 1.8* 2.4* 2.4* 2.2*    < > = values in this interval not displayed. LIVER PROFILE:   Recent Labs     03/07/20 0406 03/08/20 0511 03/09/20 0602   * 203* 153*   ALT 54* 29 25   BILIDIR <0.2 0.3 0.4*   BILITOT 0.8 0.9 1.2*   ALKPHOS 49 53 48     PT/INR:   Recent Labs     03/07/20 0406 03/08/20  0511 03/09/20  0602   PROTIME 12.0 11.6 12.5   INR 1.03 1.00 1.08     APTT:   Recent Labs     03/07/20  0406 03/08/20  0511 03/09/20  0602   APTT 23.8* 25.5 24.4     UA:No results for input(s): NITRITE, COLORU, PHUR, LABCAST, WBCUA, RBCUA, MUCUS, TRICHOMONAS, YEAST, BACTERIA, CLARITYU, SPECGRAV, LEUKOCYTESUR, UROBILINOGEN, BILIRUBINUR, BLOODU, GLUCOSEU, AMORPHOUS in the last 72 hours.     Invalid input(s): KETONESU  No results -2533.1 ml           Access  Arterial   Arterial Line 03/05/20 Right Brachial (Active)   Continued need for line? Yes 3/9/2020 10:00 AM   Site Assessment Clean;Dry; Intact 3/9/2020 10:00 AM   Line Status Arterial fluids per protocol; Intact and in place; Blood return noted; No blood noted in line 3/9/2020 10:00 AM   Art Line Waveform Appropriate 3/9/2020 10:00 AM   Art Line Interventions Leveled;Zeroed and calibrated; Flushed per protocol 3/9/2020  9:15 AM   Color/Movement/Sensation Capillary refill less than 3 sec 3/9/2020 10:00 AM   Dressing Status Clean;Dry; Intact 3/9/2020 10:00 AM   Dressing Type Transparent; Anti-microbial patch 3/9/2020 10:00 AM   Dressing Intervention New 3/5/2020  9:10 AM   Dressing Change Due 03/11/20 3/9/2020 10:00 AM   Number of days: 4       PICC          CVC       CVC Triple Lumen 03/05/20 Right Internal jugular (Active)   Continued need for line? Yes 3/9/2020 10:00 AM   Site Assessment Clean;Dry; Intact 3/9/2020 10:00 AM   Proximal Lumen Status Normal saline locked; Alcohol cap applied 3/9/2020 10:00 AM   Medial Lumen Status Normal saline locked; Alcohol cap applied 3/9/2020 10:00 AM   Distal Lumen Status Infusing 3/9/2020 10:00 AM   Dressing Type Transparent; Anti-microbial patch 3/9/2020 10:00 AM   Dressing Status Clean;Dry; Intact 3/9/2020 10:00 AM   Dressing Intervention Dressing changed 3/9/2020  9:15 AM   Dressing Change Due 03/11/20 3/9/2020 10:00 AM   Line Care Connections checked and tightened 3/9/2020  6:00 AM   Number of days: 4                  I spent 35 minutes of critical care time with this patient excluding any procedures. This note was transcribed using [id number]. Please disregard any translational errors.       [facility] [phone]

## 2020-03-09 NOTE — PROGRESS NOTES
Progress Note    S/P repair aortic dissection 3/5/20    Vital Signs:                                                 /70   Pulse 84   Temp 97.6 °F (36.4 °C) (Axillary)   Resp 16   Ht 5' 11\" (1.803 m)   Wt 284 lb 13.4 oz (129.2 kg)   SpO2 100%   BMI 39.73 kg/m²  O2 Flow Rate (L/min): 6 L/min   NSR  Admission Weight: 279 lb 15.8 oz (127 kg)      Vent Settings:  Vent Information  $Ventilation: $Subsequent Day  Ventilator Started: Yes  Skin Assessment: Clean, dry, & intact  Equipment ID: 3  Vent Type: [phone]  Vent Mode: AC/PC  Vt Ordered: 0 mL  Pressure Ordered: 18  Rate Set: 16 bmp  Peak Flow: 0 L/min  Pressure Support: 0 cmH20  FiO2 : (S) 50 %  Sensitivity: 3  PEEP/CPAP: 14  I Time/ I Time %: 2.5 s  Cuff Pressure (cm H2O): 30 cm H2O  Humidification Source: Heated wire  Humidification Temp: 37  Humidification Temp Measured: 36.8  Circuit Condensation: Drained     Drips:  Prop, fent, insulin    I/O:      Intake/Output Summary (Last 24 hours) at 3/9/2020 0758  Last data filed at 3/9/2020 0600  Gross per 24 hour   Intake 1648 ml   Output 3696 ml   Net -2048 ml     Chest Tube: 60, 50, 20    CV: reg, wound c/d/i  Pulm: decreased at bases  Abd: soft  Ext: warm, 1+ edema. BLE dopp PT/DP    Data Review:  CBC:   Recent Labs     03/08/20  1200 03/08/20 2355 03/09/20  0602   WBC 14.8* 19.1* 14.4*   HGB 10.6* 12.0* 10.4*   HCT 33.7* 38.3* 32.1*   MCV 89.1 89.9 87.9   * 130* 105*     BMP:   Recent Labs     03/08/20  0511  03/08/20  2015 03/08/20  2355 03/09/20  0602      < > 137 135* 136   K 3.8   < > 4.2 4.9 4.1      < > 99 100 104   CO2 23   < > 25 23 21   PHOS 3.4  --  4.7  --  2.2*   BUN 34*   < > 36* 40* 42*   CREATININE 2.7*   < > 1.8* 2.4* 2.4*   CALCIUM 8.2*   < > 8.7 8.5 8.4   MG 2.60*  --  2.90*  --  2.70*    < > = values in this interval not displayed. Cardiac Enzymes:   No results for input(s): CKTOTAL, CKMB, CKMBINDEX, TROPONINI in the last 72 hours.   PT/INR:   Recent Labs 03/07/20  0406 03/08/20  0511 03/09/20  0602   PROTIME 12.0 11.6 12.5   INR 1.03 1.00 1.08     APTT:   Recent Labs     03/07/20  0406 03/08/20  0511 03/09/20  0602   APTT 23.8* 25.5 24.4       Assessment/Plan:  CV - SR.    - antihypertensives. po meds per tube - BB  vasc - suspect intrabd primary tear. RLE warm, not threatened. Eventual abd imaging when can tolerate  pulm - intubated. Tenuous when wakes, keep sedated  Renal - JEYSON. crrt   - retain Dillon for accurate I+O  GI - TF. Kaofeed tip postpyloric  Heme - acute blood loss anemia. Stable.    - scds, sc hep  ophtho - lat canthotomy, L eye drops bid      Mustapha Read MD  3/9/2020  7:58 AM

## 2020-03-10 NOTE — PROGRESS NOTES
Progress Note    S/P repair aortic dissection 3/5/20    Vital Signs:                                                 /68   Pulse 89   Temp 97.5 °F (36.4 °C) (Axillary)   Resp 17   Ht 5' 11\" (1.803 m)   Wt 278 lb (126.1 kg)   SpO2 97%   BMI 38.77 kg/m²  O2 Flow Rate (L/min): 6 L/min   NSR  Admission Weight: 279 lb 15.8 oz (127 kg)      Vent Settings:  Vent Information  $Ventilation: $Subsequent Day  Ventilator Started: Yes  Skin Assessment: Clean, dry, & intact  Equipment ID: 3  Equipment Changed: Humidification  Vent Type: [phone]  Vent Mode: AC/VC+  Vt Ordered: 450 mL  Pressure Ordered: 18  Rate Set: 18 bmp  Peak Flow: 0 L/min  Pressure Support: 0 cmH20  FiO2 : 50 %  Sensitivity: 3  PEEP/CPAP: 10  I Time/ I Time %: 0.85 s  Cuff Pressure (cm H2O): 25 cm H2O  Humidification Source: Heated wire  Humidification Temp: 37  Humidification Temp Measured: 37.1  Circuit Condensation: Drained     Drips:  Prop, fent, insulin    I/O:      Intake/Output Summary (Last 24 hours) at 3/10/2020 0737  Last data filed at 3/10/2020 0700  Gross per 24 hour   Intake 1804.3 ml   Output 4872 ml   Net -3067.7 ml     Chest Tube: 60, 30, 50    CV: reg, wound c/d/i  Pulm: decreased at bases  Abd: soft  Ext: warm, 1+ edema. BLE dopp PT/DP  Neuro: moves ext spontaneously    Data Review:  CBC:   Recent Labs     03/09/20  0602 03/09/20  1120 03/10/20  0515   WBC 14.4* 13.4* 10.9   HGB 10.4* 10.1* 10.7*   HCT 32.1* 31.4* 33.4*   MCV 87.9 87.8 89.2   * 103* 112*     BMP:   Recent Labs     03/09/20  1120 03/09/20  2000 03/10/20  0408    136 138   K 4.0 4.5 4.5    103 102   CO2 21 25 23   PHOS 2.1* 4.6 3.6   BUN 43* 41* 34*   CREATININE 2.2* 1.9* 1.6*   CALCIUM 8.2* 8.5 8.3   MG 2.50* 2.90* 2.50*     Cardiac Enzymes:   No results for input(s): CKTOTAL, CKMB, CKMBINDEX, TROPONINI in the last 72 hours.   PT/INR:   Recent Labs     03/08/20  0511 03/09/20  0602 03/10/20  0515   PROTIME 11.6 12.5 13.0   INR 1.00 1.08 1. 12     APTT:   Recent Labs     03/08/20  0511 03/09/20  0602 03/10/20  0515   APTT 25.5 24.4 24.3       Assessment/Plan:  CV - SR.    - antihypertensives. po meds per tube - BB  vasc - suspect intrabd primary tear. RLE warm, not threatened. Eventual abd imaging when can tolerate, maybe today  pulm - intubated. abg pending. Renal - JEYSON. Crrt. At preop weight. - retain Dillon for accurate I+O  GI - TF. Replace kaofeed since intranasal leakage  Heme - acute blood loss anemia. Stable. - scds, sc hep  ophtho - lat canthotomy. Appreciate ophthalmology input 3/9.       Samantha Abdi MD  3/10/2020  7:37 AM

## 2020-03-10 NOTE — PROGRESS NOTES
@5112- handoff report received from Joaquin Esquivel RN. Drip handoff completed at bedside. Family at bedside at this time. @2824- shift assessment completed. VSS, afebrile. CRRT running without complications; filter to be changed soon. Pt remains intubated with SpO2 >90%; tolerating the vent well. Remains on propofol, fentanyl and insulin gtt. @2015- CRRT blood returned to pt. Filter changed out under sterile procedure  @2100- CRRT restarted  @2140- PM medications administered per orders. RT at bedside for treatment  3/11  0310- tube feeding restarted at 10 mL/hr. Will reassess for nasal drainage  @0500- VASCath dressing changed. Extremely negative access alarms. CRRT filter clotted. System and machine changed out. @5454- CRRT restarted; AM labs drawn and sent. 18- Dr. Melissa Ledesma rounding at bedside. 0302- handoff report given to Nikita Burks RN.

## 2020-03-10 NOTE — PROGRESS NOTES
Department of Internal Medicine  Nephrology Progress Note    SUBJECTIVE:  We are following this patient for JEYSON . Patient progress reviewed. HPI: He is a 53-year-old -American gentleman with a past medical history significant for obesity,  hypertension, admitted with chest pain. The patient had emergent CT scan of the chest with IV contrast that showed type A thoracic dissection extending down and involving the three major branches of the aortic arch. It was also extending down to the descending thoracic aorta, the true lumen involves both celiac and superior mesenteric arteries. The patient was taken to the OR and had a repair done 3/5/20, and also noted to have type B dissection. Postoperatively, the patient remained vent-dependent. At the time of presentation, he had a creatinine of 1.5 that had risen to 2.0. Renal consultation has been called .       Subjective:  Resting in bed; sedated; intubated - requiring Vent support      Physical Exam:    VITALS:  /68   Pulse 93   Temp 97 °F (36.1 °C) (Axillary)   Resp 19   Ht 5' 11\" (1.803 m)   Wt 278 lb (126.1 kg)   SpO2 99%   BMI 38.77 kg/m²   24HR INTAKE/OUTPUT:      Intake/Output Summary (Last 24 hours) at 3/10/2020 1051  Last data filed at 3/10/2020 1000  Gross per 24 hour   Intake 1589.7 ml   Output 4630 ml   Net -3040.3 ml       Constitutional:  On vent / sedated   Respiratory:  CTA anteriorly  Gastrointestinal:  Distended, hypoactive bowel Sounds  Cardiovascular:  S1, S2 RRR   Edema:  Trace edema    Medications:   metoprolol  2.5 mg Intravenous Q6H    [START ON 3/11/2020] erythromycin   Left Eye Every Other Day    ipratropium-albuterol  1 ampule Inhalation Q8H    [Held by provider] metoprolol tartrate  25 mg Oral BID    heparin (porcine)  5,000 Units Subcutaneous BID    [Held by provider] aspirin  325 mg Oral Daily    [Held by provider] sennosides  5 mL Per NG tube BID    sodium chloride flush  10 mL Intravenous 2 times per day

## 2020-03-10 NOTE — PROGRESS NOTES
Pulmonary Progress Note    Date of Admission: 3/5/2020   LOS: 5 days       CC:  dissection    Subjective:     Ct chest tube           ROS:   Unable to assess     PHYSICAL EXAM:   Blood pressure 106/68, pulse 86, temperature 97 °F (36.1 °C), temperature source Axillary, resp. rate 19, height 5' 11\" (1.803 m), weight 278 lb (126.1 kg), SpO2 99 %.'  Gen: No distress. Eyes: PERRL. No sclera icterus. No conjunctival injection. ENT: No discharge. Pharynx clear. External appearance of ears and nose normal.  Neck: Trachea midline. No obvious mass. Resp: No accessory muscle use. No crackles. No wheezes. No rhonchi. Chest tubes. CV: Regular rate. Regular rhythm. No murmur or rub. No edema. GI: Non-tender. Non-distended. No hernia. Skin: Warm, dry, normal texture and turgor. No nodule on exposed extremities. Lymph: No cervical LAD. No supraclavicular LAD. M/S: No cyanosis. No clubbing. No joint deformity.     Neuro:    Psych:        Medications:    Scheduled Meds:   metoprolol  2.5 mg Intravenous Q6H    [START ON 3/11/2020] erythromycin   Left Eye Every Other Day    ipratropium-albuterol  1 ampule Inhalation Q8H    [Held by provider] metoprolol tartrate  25 mg Oral BID    heparin (porcine)  5,000 Units Subcutaneous BID    [Held by provider] aspirin  325 mg Oral Daily    [Held by provider] sennosides  5 mL Per NG tube BID    sodium chloride flush  10 mL Intravenous 2 times per day    pantoprazole  40 mg Intravenous Daily    And    sodium chloride (PF)  10 mL Intravenous Daily    chlorhexidine  15 mL Mouth/Throat BID       Continuous Infusions:   fentaNYL 150 mcg/hr (03/10/20 1019)    insulin 2.04 Units/hr (03/10/20 1311)    prismaSATE BGK 4/2.5 1,000 mL/hr (03/10/20 1053)    prismaSATE BGK 4/2.5 500 mL/hr (03/09/20 2027)    prismaSATE BGK 4/2.5 500 mL/hr (03/09/20 2026)    sodium chloride      norepinephrine Stopped (03/08/20 0130)    nitroGLYCERIN Stopped (03/09/20 1527)    niCARdipine Stopped (03/07/20 8698)    dextrose      propofol 45 mcg/kg/min (03/10/20 1020)       PRN Meds:  heparin (porcine), fentanNYL, perflutren lipid microspheres, sodium chloride, potassium chloride, magnesium sulfate, sodium phosphate IVPB **OR** sodium phosphate IVPB **OR** sodium phosphate IVPB **OR** sodium phosphate IVPB, calcium gluconate-NaCl **OR** calcium gluconate IVPB **OR** calcium gluconate IVPB **OR** calcium gluconate IVPB, sodium chloride flush, acetaminophen, oxyCODONE **OR** oxyCODONE, ondansetron, metoclopramide, hydrALAZINE, metoprolol, albuterol sulfate HFA, albumin human, sodium chloride, norepinephrine, nitroGLYCERIN, niCARdipine, glucose, dextrose, glucagon (rDNA), dextrose    Labs reviewed:  CBC:   Recent Labs     03/09/20  0602 03/09/20  1120 03/10/20  0515   WBC 14.4* 13.4* 10.9   HGB 10.4* 10.1* 10.7*   HCT 32.1* 31.4* 33.4*   MCV 87.9 87.8 89.2   * 103* 112*     BMP:   Recent Labs     03/09/20  2000 03/10/20  0408 03/10/20  1120    138 137   K 4.5 4.5 4.8    102 102   CO2 25 23 24   PHOS 4.6 3.6 3.6   BUN 41* 34* 32*   CREATININE 1.9* 1.6* 1.3     LIVER PROFILE:   Recent Labs     03/08/20 0511 03/09/20  0602 03/10/20  0515   * 153* 117*   ALT 29 25 24   BILIDIR 0.3 0.4* 0.4*   BILITOT 0.9 1.2* 1.0   ALKPHOS 53 48 50     PT/INR:   Recent Labs     03/08/20 0511 03/09/20  0602 03/10/20  0515   PROTIME 11.6 12.5 13.0   INR 1.00 1.08 1.12     APTT:   Recent Labs     03/08/20  0511 03/09/20  0602 03/10/20  0515   APTT 25.5 24.4 24.3     UA:No results for input(s): NITRITE, COLORU, PHUR, LABCAST, WBCUA, RBCUA, MUCUS, TRICHOMONAS, YEAST, BACTERIA, CLARITYU, SPECGRAV, LEUKOCYTESUR, UROBILINOGEN, BILIRUBINUR, BLOODU, GLUCOSEU, AMORPHOUS in the last 72 hours. Invalid input(s): Mami Nasreen  No results for input(s): PH, PCO2, PO2 in the last 72 hours. Cx:      Films:  Radiology Review:  Pertinent images / reports were reviewed as a part of this visit.     CT Chest w/ contrast: No results found for this or any previous visit. CT Chest w/o contrast: No results found for this or any previous visit. CTPA: No results found for this or any previous visit. CXR PA/LAT: No results found for this or any previous visit. CXR portable:   Results for orders placed during the hospital encounter of 03/05/20   XR CHEST PORTABLE    Narrative EXAMINATION:  ONE XRAY VIEW OF THE CHEST    3/10/2020 2:53 am    COMPARISON:  03/07/2020    HISTORY:  ORDERING SYSTEM PROVIDED HISTORY: intubated  TECHNOLOGIST PROVIDED HISTORY:  Reason for exam:->intubated  Reason for Exam: intubated    FINDINGS:  Recent sternotomy. Endotracheal tube tip mid trachea. Enteric tube stomach. Left chest tube is stable. Right internal jugular she with catheter tip  upper SVC. Cardiomegaly. Stable left basilar opacification suggesting small  left effusion and atelectasis. Right lung appears clear. No pneumothorax. Impression Stable left basilar opacifications suggesting pleural effusion and left  basilar atelectasis/pneumonitis. Support devices in good position. Assessment:             Plan:      Acute hypoxemia      Duoneb's      PEEP 10, fio2 50%   RR 16   Vt 450     JEYSON    CRRT with fluid removal.       BP    Per CT surg      Dissection   Type B.     CT surgery / Vascular following        Prophylaxis   GI - pepcid     DVT - heparin     VAP - peridex   Nasal Decolonization - Bactroban    Nutrition  Diet Tube Feed Continuous/Cyclic w/ Diet    Intake/Output Summary (Last 24 hours) at 3/10/2020 1314  Last data filed at 3/10/2020 1311  Gross per 24 hour   Intake 1196.3 ml   Output 4504 ml   Net -3307.7 ml          Access  Arterial   Arterial Line 03/05/20 Right Brachial (Active)   Continued need for line? Yes 3/10/2020  6:00 AM   Site Assessment Clean;Dry; Intact 3/10/2020  6:00 AM   Line Status Arterial fluids per protocol; Intact and in place; Blood return noted; No blood noted in line

## 2020-03-11 NOTE — PROGRESS NOTES
VASCULAR     Seen for type B aortic dissection. Remains on the vent - heavily sedated but not paralyzed.     -120 (no IV antiHTN drips)        HR <100       UO off CRRT 100-200 cc/hr      Vent 50% O2 with peep 10+               Abd soft, nontender  Palpable B DP pulse - warm feet     Labs reviewed  Creta 1.3    ABG without acidosis - pO2 140     CTA reviewed: both true lumen and false lumen fill well. B renal arteries fill well from FL. Celiac and SMA filling well from TL. Dissections into B iliacs stop just past the origins     A/P:     Aortic dissection type B S/P ascending aortic replacement              Slowly improving with better oxygenation, decreased PEEP and renal function.              Will follow.  Would not treat now with stent graft since all viscera well perfused.                 Daryl Escobar

## 2020-03-11 NOTE — PROGRESS NOTES
previous visit. CXR portable:   Results for orders placed during the hospital encounter of 03/05/20   XR CHEST PORTABLE    Narrative EXAMINATION:  ONE XRAY VIEW OF THE CHEST    3/11/2020 3:28 am    COMPARISON:  03/10/2020    HISTORY:  ORDERING SYSTEM PROVIDED HISTORY: intubated  TECHNOLOGIST PROVIDED HISTORY:  Reason for exam:->intubated  Reason for Exam: intubated    FINDINGS:  Support hardware is unchanged in position. The heart is enlarged with  interval development of moderate central pulmonary venous congestion. Multifocal bilateral perihilar and lower lobe airspace opacification has  progressed as well. There are small bilateral pleural effusions, left  greater than right. Impression CHF with worsening multifocal pulmonary edema and or superimposed pneumonia. Assessment:             Plan:      Acute hypoxemia      Duoneb's      PEEP 10, fio2 50%   RR 16   Vt 450    bronchoscopy today. Start empiric antibiotics. Vanc merrem. JEYSON    CRRT stopped    Making urine.  Cr. Improved. BP    Per CT surg      Dissection   Type B.     CT surgery / Vascular following        Prophylaxis   GI - pepcid     DVT - heparin     VAP - peridex   Nasal Decolonization - Bactroban    Nutrition  Diet Tube Feed Continuous/Cyclic w/ Diet    Intake/Output Summary (Last 24 hours) at 3/11/2020 1041  Last data filed at 3/11/2020 1009  Gross per 24 hour   Intake 951.44 ml   Output 3004 ml   Net -2052.56 ml          Access  Arterial   Arterial Line 03/05/20 Right Brachial (Active)   Continued need for line? Yes 3/10/2020  6:00 AM   Site Assessment Clean;Dry; Intact 3/10/2020  6:00 AM   Line Status Arterial fluids per protocol; Intact and in place; Blood return noted; No blood noted in line 3/10/2020  6:00 AM   Art Line Waveform Appropriate;Square wave test performed 3/10/2020  6:00 AM   Art Line Interventions Leveled;Zeroed and calibrated; Flushed per protocol 3/9/2020  9:15 AM Color/Movement/Sensation Capillary refill less than 3 sec 3/10/2020  6:00 AM   Dressing Status Clean;Dry; Intact 3/10/2020  6:00 AM   Dressing Type Transparent; Anti-microbial patch 3/10/2020  6:00 AM   Dressing Intervention New 3/5/2020  9:10 AM   Dressing Change Due 03/11/20 3/10/2020  6:00 AM   Number of days: 5       PICC          CVC       CVC Triple Lumen 03/05/20 Right Internal jugular (Active)   Continued need for line? Yes 3/10/2020  6:00 AM   Site Assessment Clean;Dry; Intact 3/10/2020  6:00 AM   Proximal Lumen Status Infusing 3/10/2020  6:00 AM   Medial Lumen Status Normal saline locked; Alcohol cap applied 3/10/2020  6:00 AM   Distal Lumen Status Infusing 3/10/2020  6:00 AM   Dressing Type Transparent; Anti-microbial patch 3/10/2020  6:00 AM   Dressing Status Clean;Dry; Intact 3/10/2020  6:00 AM   Dressing Intervention Dressing changed 3/9/2020  9:15 AM   Dressing Change Due 03/11/20 3/10/2020  6:00 AM   Line Care Connections checked and tightened 3/9/2020  6:00 AM   Number of days: 5                  I spent 34  minutes of critical care time with this patient excluding any procedures. This note was transcribed using [id number] [facility]. Please disregard any translational errors.       Rian Pringle [facility] [phone]

## 2020-03-11 NOTE — CONSULTS
Clinical Pharmacy Note  Vancomycin Consult    Marko Steele is a 35 y.o. male ordered Vancomycin for empiric coverage; consult received from SANJEEV Henson to manage therapy. Also receiving meropenem. Patient Active Problem List   Diagnosis    Ascending aortic aneurysm ([address]    Dissection of thoracic aorta ([address]    Dissecting aneurysm of thoracic aorta, [place] type A ([address]    Acute respiratory failure with hypoxia ([address]    Centrilobular emphysema ([address]    JEYSON (acute kidney injury) ([address]       Allergies:  Ibuprofen     Temp max:  Temp (24hrs), Av.4 °F (37.4 °C), Min:98.2 °F (36.8 °C), Max:100.4 °F (38 °C)      Recent Labs     20  1120 03/10/20  0515 20  0359   WBC 13.4* 10.9 11.0       Recent Labs     03/10/20  1120 03/10/20  2013 03/11/20  0359   BUN 32* 36* 32*   CREATININE 1.3 1.3 1.3         Intake/Output Summary (Last 24 hours) at 3/11/2020 1232  Last data filed at 3/11/2020 1009  Gross per 24 hour   Intake 951.44 ml   Output 2840 ml   Net -1888.56 ml       Culture Results:  3/11/20 BAL culture sents    Ht Readings from Last 1 Encounters:   20 5' 11\" (1.803 m)        Wt Readings from Last 1 Encounters:   20 276 lb 7.3 oz (125.4 kg)         Estimated Creatinine Clearance: 102 mL/min (based on SCr of 1.3 mg/dL). Assessment/Plan:  Day # 1 of vancomycin. CRRT stopped yesterday. Renal function recovering. Vancomycin 1500 mg IV x 1 today. Vancomycin random level tomorrow in AM.   Discussed with Dr. Alfredito Siu. Thank you for the consult. Will continue to follow.     Eron Gore, PharmD, [facility]

## 2020-03-11 NOTE — PROGRESS NOTES
Progress Note    S/P repair aortic dissection 3/5/20    Vital Signs:                                                 BP (!) 143/81   Pulse 110   Temp 99.4 °F (37.4 °C) (Axillary)   Resp 18   Ht 5' 11\" (1.803 m)   Wt 276 lb 7.3 oz (125.4 kg)   SpO2 91%   BMI 38.56 kg/m²  O2 Flow Rate (L/min): 6 L/min   NSR  Admission Weight: 279 lb 15.8 oz (127 kg)      Vent Settings:  Vent Information  $Ventilation: $Subsequent Day  Ventilator Started: Yes  Skin Assessment: Clean, dry, & intact  Equipment ID: [id number]  Equipment Changed: Humidification  Vent Type: 840  Vent Mode: AC/VC+  Vt Ordered: 450 mL  Pressure Ordered: 18  Rate Set: 18 bmp  Peak Flow: 0 L/min  Pressure Support: 0 cmH20  FiO2 : 50 %  Sensitivity: 3  PEEP/CPAP: 10  I Time/ I Time %: 0.85 s  Cuff Pressure (cm H2O): 30 cm H2O  Humidification Source: Heated wire  Humidification Temp: 37  Humidification Temp Measured: 37.2  Circuit Condensation: Drained     Drips:  Prop, fent, insulin    I/O:      Intake/Output Summary (Last 24 hours) at 3/11/2020 0709  Last data filed at 3/11/2020 0600  Gross per 24 hour   Intake 951.44 ml   Output 3264 ml   Net -2312.56 ml     Chest Tube: 40, 50, 60    CV: reg, wound c/d/i  Pulm: decreased at bases  Abd: soft  Ext: warm, 1+ edema. BLE dopp PT/DP  Neuro: moves ext spontaneously    Data Review:  CBC:   Recent Labs     03/09/20  1120 03/10/20  0515 03/11/20  0359   WBC 13.4* 10.9 11.0   HGB 10.1* 10.7* 9.6*   HCT 31.4* 33.4* 29.7*   MCV 87.8 89.2 89.2   * 112* 135     BMP:   Recent Labs     03/10/20  1120 03/10/20  2013 03/11/20  0359    134* 140   K 4.8 4.8 4.5    100 104   CO2 24 24 25   PHOS 3.6 2.9 3.7   BUN 32* 36* 32*   CREATININE 1.3 1.3 1.3   CALCIUM 8.4 8.8 8.9   MG 2.60* 2.40 2.30     Cardiac Enzymes:   No results for input(s): CKTOTAL, CKMB, CKMBINDEX, TROPONINI in the last 72 hours.   PT/INR:   Recent Labs     03/09/20  0602 03/10/20  0515 03/11/20  0359   PROTIME 12.5 13.0 14.3*   INR 1.08

## 2020-03-11 NOTE — PROGRESS NOTES
Department of Internal Medicine  Nephrology Progress Note    SUBJECTIVE:  We are following this patient for JEYSON . Patient progress reviewed. HPI: He is a 76-year-old -American gentleman with a past medical history significant for obesity,  hypertension, admitted with chest pain. The patient had emergent CT scan of the chest with IV contrast that showed type A thoracic dissection extending down and involving the three major branches of the aortic arch. It was also extending down to the descending thoracic aorta, the true lumen involves both celiac and superior mesenteric arteries. The patient was taken to the OR and had a repair done 3/5/20, and also noted to have type B dissection. Postoperatively, the patient remained vent-dependent. At the time of presentation, he had a creatinine of 1.5 that had risen to 2.0. Renal consultation has been called .       Subjective:  Resting in bed; sedated; intubated - requiring Vent support      Physical Exam:    VITALS:  BP (!) 143/81   Pulse 100   Temp 99.4 °F (37.4 °C) (Axillary)   Resp 11   Ht 5' 11\" (1.803 m)   Wt 276 lb 7.3 oz (125.4 kg)   SpO2 95%   BMI 38.56 kg/m²   24HR INTAKE/OUTPUT:      Intake/Output Summary (Last 24 hours) at 3/11/2020 1120  Last data filed at 3/11/2020 1009  Gross per 24 hour   Intake 951.44 ml   Output 2840 ml   Net -1888.56 ml     Patient Vitals for the past 96 hrs (Last 3 readings):   Weight   03/11/20 0400 276 lb 7.3 oz (125.4 kg)   03/10/20 0500 278 lb (126.1 kg)   03/09/20 0400 284 lb 13.4 oz (129.2 kg)       Constitutional:  On vent / sedated   Respiratory:  CTA anteriorly  Gastrointestinal:  Distended, hypoactive bowel Sounds  Cardiovascular: RRR   Edema:  Trace edema    Medications:   meropenem  500 mg Intravenous Q6H    vancomycin (VANCOCIN) intermittent dosing (placeholder)   Other RX Placeholder    vancomycin  1,500 mg Intravenous Once    sodium chloride flush  10 mL Intravenous 2 times per day    erythromycin Left Eye Every Other Day    ipratropium-albuterol  1 ampule Inhalation Q8H    metoprolol tartrate  25 mg Oral BID    heparin (porcine)  5,000 Units Subcutaneous BID    aspirin  325 mg Oral Daily    sennosides  5 mL Per NG tube BID    pantoprazole  40 mg Intravenous Daily    And    sodium chloride (PF)  10 mL Intravenous Daily    chlorhexidine  15 mL Mouth/Throat BID       DATA:    CBC:  Lab Results   Component Value Date    WBC 11.0 03/11/2020    RBC 3.32 03/11/2020    HGB 9.6 03/11/2020    HCT 29.7 03/11/2020    MCV 89.2 03/11/2020    MCH 28.8 03/11/2020    MCHC 32.2 03/11/2020    RDW 14.0 03/11/2020     03/11/2020    MPV 9.1 03/11/2020     CMP:  Lab Results   Component Value Date     03/11/2020    K 4.5 03/11/2020     03/11/2020    CO2 25 03/11/2020    BUN 32 03/11/2020    CREATININE 1.3 03/11/2020    GFRAA >60 03/11/2020    LABGLOM >60 03/11/2020    GLUCOSE 80 03/11/2020    PROT 6.0 03/11/2020    CALCIUM 8.9 03/11/2020    BILITOT 0.9 03/11/2020    ALKPHOS 46 03/11/2020    AST 96 03/11/2020    ALT 22 03/11/2020        Phosphorus:   Lab Results   Component Value Date    PHOS 3.7 03/11/2020       ASSESSMENT:  Active Problems:    Ascending aortic aneurysm (HCC)    Dissection of thoracic aorta (HCC)    Dissecting aneurysm of thoracic aorta, [place] type A (HCC)    Acute respiratory failure with hypoxia (HCC)    Centrilobular emphysema (HCC)    JEYSON (acute kidney injury) ([facility]  Resolved Problems:    * No resolved hospital problems. *      PLAN:  1. JEYSON - Most likely ATN / JOHNNY - Stopped CRRT 3/10/20 ? Recovering. UO increased and creatinine rise was minimal off CRRT in the last 24 hours Hemodynamically stable. Check labs tomorrow in AM and decide on RRT  2. HTN - controlled. 3. Resp failure - on vent   4. Type A aortic dissection - S/p ascending aortic replacement - the patient also has Type B - per Vascular would not treat now with stent graft since all viscera well perfused.     Amr N Byron Alva MD

## 2020-03-12 NOTE — PROGRESS NOTES
Nutrition Assessment (Enteral Nutrition)    Type and Reason for Visit: Reassess    Nutrition Recommendations:   Refer to MD recommending 2 bottles of Proteinex per feeding tube per day. Do not mix directly with TF  Continue Nepro TF formula at goal rate of 40 ml per hour    Nutrition Assessment: Pt with improving status with hopeful extubation soon. CRRT on hold as pt appearing to have renal recovery per Nephrology. Nepro tolerated at 40 ml per hour per MD orders. Propofol being weaned, as Precedex dose is increasing. Propofol currently at 30.9 ml per hour adding 816 kcals per day. Renal TF formula remains appropriate at 40 ml per hour, with current Propofol dose. Flush per MD. Will recommend decreasing Proteinex to 2 bottles per day at this time. Malnutrition Assessment:  · Malnutrition Status: At risk for malnutrition  · Context: Acute illness or injury  · Findings of the 6 clinical characteristics of malnutrition (Minimum of 2 out of 6 clinical characteristics is required to make the diagnosis of moderate or severe Protein Calorie Malnutrition based on AND/ASPEN Guidelines):  1. Energy Intake-Greater than 75% of estimated energy requirement, Greater than or equal to 5 days    2. Weight Loss-Unable to assess(fluid shifts),    3. Fat Loss-Unable to assess,    4. Muscle Loss-Unable to assess,    5. Fluid Accumulation-Moderate to severe fluid accumulation, Generalized  6.  Strength-Not measured    Nutrition Risk Level: High    Nutrition Needs:  · Estimated Daily Total Kcal: 0666-3825 kcal (15-18 kcal/kg CBW)  · Estimated Daily Protein (g): 117-156 g (1.5-2 g/kg IBW)  · Estimated Daily Fluid (ml/day): per MD    Nutrition Diagnosis:   · Problem: Inadequate oral intake  · Etiology: related to Impaired respiratory function-inability to consume food     Signs and symptoms:  as evidenced by Intubation    Objective Information:  · Nutrition-Focused Physical Findings: Noted no BM.  SMOG edema this date with no results thus far. Pt with +1 & +2 generalized edema. · Wound Type: (Noted no issues with SI sites)  · Current Nutrition Therapies:  · Oral Diet Orders: NPO   · Oral Diet intake: NPO  · Oral Nutrition Supplement (ONS) Orders: None  · ONS intake: NPO  · Tube Feeding (TF) Orders:   · Feeding Route: Nasogastric  · Formula: Renal  · Rate (ml/hr):40 (rate adjusted for routine Nursing care (~20 hour run) & Propofol dose    · Volume (ml/day): 800  · Duration: Continuous  · Additives/Modulars: Protein(2 bottles of Proteinex per feeding tube per day)  · Water Flushes: per provider  · Current TF & Flush Orders Provides: 800 ml TV / 1648 (1440 (TF) + 208 (Proteinex) / 117 gms pro (65 (TF) + 52 (Proteinex) / 582 ml free water per day  · Additional Calories: Propofol at 30.9 ml per hour adding 816 kcals per day  · Anthropometric Measures:  · Ht: 5' 11\" (180.3 cm)   · Current Body Wt: 275 lb (124.7 kg)  · Admission Body Wt: 280 lb (127 kg)  · Ideal Body Wt: 172 lb (78 kg), % Ideal Body    · BMI Classification: BMI 35.0 - 39.9 Obese Class II    Nutrition Interventions:   Continue current Tube Feeding(refer to MD regarding restarting 2 bottles of Proteinex per feeding tube per day.)  Continued Inpatient Monitoring    Nutrition Evaluation:   · Evaluation: Progressing toward goals   · Goals:  Tolerate most appropriate form of nutrition this admission    · Monitoring: TF Intake, TF Tolerance, Wound Healing, Weight, Pertinent Labs, Nausea or Vomiting, Diarrhea, Constipation      Electronically signed by Mani Quiñones RD, LD on 3/12/20 at 12:46 PM EDT    Contact Number: [phone]

## 2020-03-12 NOTE — PROGRESS NOTES
Late Entry:  @ 3362 Bedside handoff with A Hai, RN. Dr Raquel Schofield and Rodrigo Geiger CNP at bedside for CTS rounds prior to handoff. New orders noted. Pt's mom at bedside during rounds. @ [address] [facility],[address] [employer] student at bedside to remove CT. See notes. @ 0815 RT at bedside for rounds. Assessment completed. @ 0850 Scheduled medications administered. Pt's mom remains at bedside, sleeping on sofa. @ 1039 TF placed on hold to lay pt flat and turn for administration of enema. SMOG enema administered. Not stool noted with digital exam, no stool on tip of enema tubing after administration. Pt with movement in extremities x4 and flinching with insertion of enema tubing. Pt retaining enema. TF resumed after enema administration. @ 1100 No results from enema at this time. @ 1109 Dr Lupe Nicole CNP, pharmacist and dietician at bedside for critical care rounds. Sedation stopped, vent placed on SBT by Dr Boyd Gaspar. @ 1115 Pt failed SBT, tachycardia, hypertensive, tachypnea, hypoxic and agitation. Dr Boyd Gaspar placed pt back on A/C V/C+; Propofol gtt restarted, waiting for Precedex gtt from pharmacy. Titrating Cardene and NTG gtt to order parameters see eMAR and flowsheets. @  started, will titrate for sedation and wean Propofol gtt. ABGs drawn and sent to lab.  @ 1230 RT at bedside continued hypoxia, RT communicating with critical care, see flowsheets for changes  @ 1330 Continued hypoxia, RT communicating with Dr Boyd Gaspar. @ 26 Dr Boyd Gaspar and RT, bedside bronchoscopy. TF on hold  @ 1415 Left nare small bore feeding tube noted to be out to 45cm marking after bronch. @ [address] bore feeding tube reinserted left nare. Portable CXR order placed to verify placement. @ 1600 NTG and Cardene gtts weaned off at this time. See eMAR and flowsheets.   @ 1640 ABP increasing, NTG restarted see eMAR and flowsheets  @ 8559 Received call from radiology re: ETT placement and recommending pulling ETT back 1cm. Temp increasing 102, Tylenol administered via NG tube. Rodrigo Geiger CNP updated in department re: increasing temp and radiology report of ETT placement. PRN Fentanyl administered see eMAR and flowsheets. @ 1700 Propofol weaned off at this time. @ 1723 Pt restless in bed with tachypnea, PRN Versed administered, see eMAR. @ 1800 No change in temp. Ice packs applied to groin, axilla and neck. @ 1818 Pt restless in bed, hypertensive, tachypnic. Propofol restarted see eMAR. @ 1844 Temp 102.4, call placed to critical care. Order for blood culture, lab notified. @ 1930 Bedside handoff with Shannan So RN. Continue to titrated NTG and Cardene gtts to keep ABP in ordered parameters, see eMAR for current rates. Temp 102.4, Setting up for cooling blanket.

## 2020-03-12 NOTE — PROGRESS NOTES
Called for continued hypoxemia. With chest X-ray results, bronchoscopy completed with mucus plugging at the left lung. Removed with serial washings with recruitment completed with peep increased to 37 for 40 seconds with Vt 65. Improved saturation.

## 2020-03-12 NOTE — PROCEDURES
Central Venous Catheter Insertion Procedure Note    Consent:    Informed consent was obtained for the procedure, including sedation. Risks of  hemorrhage, arrhythmia, infection and adverse drug reaction were discussed    Indication:  JEYSON    Procedure:    Ultrasound used and rightfemoral vein was noted to be patent. Patient positioned then prepared with sterile technique. Including placement of sterile drape, wearing of sterile gloves/gown and adhering to universal precautions. Lidocaine was administered via 25 gauge needle. 18 gauge needle inserted with access of rightfemoral vein under active ultrasound guidance. Wire inserted to 20 cm (image saved). Small incision made in skin, dilator inserted over wire then removed. Triple lumen inserted to 24 cm, secured and sterile dressing applied including placement of biopatch. Complications:  None  There were no changes to vital signs. Patient did tolerate procedure well.     CXR not indicated    Estimated blood loss:  10 ml    No immediate complications
segment through the 4th generation bronchi: white phlegm with mucus plugging  Left lower lobe basilar and superior segments uptill 4th generation bronchi: white phlegm with mucus plugging    Specimens:  1. None    The Patient was taken to the Endoscopy Recovery area in satisfactory condition.         Marium Stover

## 2020-03-12 NOTE — PROGRESS NOTES
Clinical Pharmacy Note  Vancomycin Consult    Cynthia Fontanez is a 35 y.o. male ordered Vancomycin for empiric coverage; consult received from SANJEEV Rico to manage therapy. Also receiving meropenem. Patient Active Problem List   Diagnosis    Ascending aortic aneurysm ([address]    Dissection of thoracic aorta (HCC)    Dissecting aneurysm of thoracic aorta, [place] type A ([address]    Acute respiratory failure with hypoxia ([address]    Centrilobular emphysema ([address]    JEYSON (acute kidney injury) ([address]       Allergies:  Ibuprofen     Temp max:  Temp (24hrs), Av.7 °F (38.2 °C), Min:100.2 °F (37.9 °C), Max:101 °F (38.3 °C)      Recent Labs     03/10/20  0515 03/11/20  0359 20  0426   WBC 10.9 11.0 9.1       Recent Labs     03/10/20  2013 03/11/20  0359 20  0426   BUN 36* 32* 30*   CREATININE 1.3 1.3 1.1         Intake/Output Summary (Last 24 hours) at 3/12/2020 1434  Last data filed at 3/12/2020 1429  Gross per 24 hour   Intake 4196.17 ml   Output 2810 ml   Net 1386.17 ml       Culture Results:  3/11/20 BAL culture sents    Ht Readings from Last 1 Encounters:   20 5' 11\" (1.803 m)        Wt Readings from Last 1 Encounters:   20 275 lb 2.2 oz (124.8 kg)         Estimated Creatinine Clearance: 120 mL/min (based on SCr of 1.1 mg/dL). Assessment/Plan:  Day # 2 of vancomycin. No RRT at this juncture. Random vancomycin level 6.7 mg/L. Vancomycin 1500 mg IV x 1 today. Vancomycin random level tomorrow in AM. Discussed with Dr. Reshma Marin. Thank you for the consult. Will continue to follow.     Soila Seip, PharmD, [facility]

## 2020-03-12 NOTE — PROGRESS NOTES
1900- handoff report received from Cally Talamantes RN. Porfirio signed off on at bedside. Family at bedside at this time. Tube feeds increased to goal rate of 40 mL/hr    2015- spoke with family about plan of care. Informed family that if pt's morning labs look stable and kidney function remains the same, to include UOP overnight, then pt will most likely not received HD tomorrow either. Informed family that cultures and mucus samples from the bronch were sent to lab to test for respiratory infection; nasal swabs completed for MRSA and RSV as well. Educated family on antibiotics that were started. Shift assessment completed; VSS, low grade temp. Pt remains on 10 mcg/min of nitro IV for BP control. 3/12  0032- pt BP increasing; SBP 130s. Pt starting to move arms and cough against the vent. 2 mg versed administered per orders. 6278- tylenol administered for temp 100.8    0215- AM hygiene completed. CHG solution bath; gown changed, pt repositioned. MSI and chest tube dressings changed; CHG cleanse to sites. Pt tolerated well.      8788- nitro stopped    0630- family at bedside    0700- handoff report given to [employer], RN

## 2020-03-12 NOTE — PROGRESS NOTES
Pulmonary Progress Note    Date of Admission: 3/5/2020   LOS: 7 days       CC:  dissection    Subjective:        Bronchoscopy yesterday. No bowl movement. Started NItro with elevated bp. ROS:   Unable to assess     PHYSICAL EXAM:   Blood pressure (!) 109/59, pulse 98, temperature 100.8 °F (38.2 °C), temperature source Axillary, resp. rate 18, height 5' 11\" (1.803 m), weight 275 lb 2.2 oz (124.8 kg), SpO2 100 %.'  Gen: No distress. Eyes: left eye covered. ENT: No discharge. Pharynx clear. External appearance of ears and nose normal.  ett  Neck: Trachea midline. No obvious mass. Resp: No accessory muscle use. No crackles. No wheezes. No rhonchi. CV: Regular rate. Regular rhythm. No murmur or rub. No edema. GI: Non-tender. Non-distended. No hernia. Skin: Warm, dry, normal texture and turgor. No nodule on exposed extremities. Lymph: No cervical LAD. No supraclavicular LAD. M/S: No cyanosis. No clubbing. No joint deformity.     Neuro:    Psych:        Medications:    Scheduled Meds:   meropenem  500 mg Intravenous Q6H    vancomycin  1,500 mg Intravenous Once    vancomycin (VANCOCIN) intermittent dosing (placeholder)   Other RX Placeholder    sodium chloride flush  10 mL Intravenous 2 times per day    erythromycin   Left Eye Every Other Day    ipratropium-albuterol  1 ampule Inhalation Q8H    metoprolol tartrate  25 mg Oral BID    heparin (porcine)  5,000 Units Subcutaneous BID    aspirin  325 mg Oral Daily    sennosides  5 mL Per NG tube BID    pantoprazole  40 mg Intravenous Daily    And    sodium chloride (PF)  10 mL Intravenous Daily    chlorhexidine  15 mL Mouth/Throat BID       Continuous Infusions:   nitroGLYCERIN Stopped (03/12/20 0558)    fentaNYL 200 mcg/hr (03/12/20 0853)    insulin 5.88 Units/hr (03/12/20 1015)    dextrose      propofol 50 mcg/kg/min (03/12/20 1017)       PRN Meds:  sodium chloride flush, potassium chloride, midazolam, heparin (porcine), fentanNYL, perflutren lipid microspheres, acetaminophen, oxyCODONE **OR** oxyCODONE, ondansetron, metoclopramide, hydrALAZINE, metoprolol, albuterol sulfate HFA, glucose, dextrose, glucagon (rDNA), dextrose    Labs reviewed:  CBC:   Recent Labs     03/10/20  0515 03/11/20  0359 03/12/20  0426   WBC 10.9 11.0 9.1   HGB 10.7* 9.6* 8.8*   HCT 33.4* 29.7* 27.2*   MCV 89.2 89.2 89.3   * 135 184     BMP:   Recent Labs     03/10/20  2013 03/11/20  0359 03/12/20  0426   * 140 144   K 4.8 4.5 4.6    104 107   CO2 24 25 25   PHOS 2.9 3.7 3.5   BUN 36* 32* 30*   CREATININE 1.3 1.3 1.1     LIVER PROFILE:   Recent Labs     03/10/20  0515 03/11/20  0359 03/12/20  0426   * 96* 71*   ALT 24 22 21   BILIDIR 0.4* 0.4* 0.3   BILITOT 1.0 0.9 0.8   ALKPHOS 50 46 53     PT/INR:   Recent Labs     03/10/20  0515 03/11/20  0359 03/12/20  0426   PROTIME 13.0 14.3* 14.7*   INR 1.12 1.23* 1.26*     APTT:   Recent Labs     03/10/20  0515 03/11/20  0359 03/12/20  0426   APTT 24.3 25.9 26.9     UA:No results for input(s): NITRITE, COLORU, PHUR, LABCAST, WBCUA, RBCUA, MUCUS, TRICHOMONAS, YEAST, BACTERIA, CLARITYU, SPECGRAV, LEUKOCYTESUR, UROBILINOGEN, BILIRUBINUR, BLOODU, GLUCOSEU, AMORPHOUS in the last 72 hours. Invalid input(s): Garry Cramer  No results for input(s): PH, PCO2, PO2 in the last 72 hours. Cx:      Films:  Radiology Review:  Pertinent images / reports were reviewed as a part of this visit. CT Chest w/ contrast: No results found for this or any previous visit. CT Chest w/o contrast: No results found for this or any previous visit. CTPA: No results found for this or any previous visit. CXR PA/LAT: No results found for this or any previous visit.     CXR portable:   Results for orders placed during the hospital encounter of 03/05/20   XR CHEST PORTABLE    Narrative EXAMINATION:  ONE XRAY VIEW OF THE CHEST    3/11/2020 3:28 am    COMPARISON:  03/10/2020    HISTORY:  ORDERING SYSTEM PROVIDED HISTORY: intubated  TECHNOLOGIST PROVIDED HISTORY:  Reason for exam:->intubated  Reason for Exam: intubated    FINDINGS:  Support hardware is unchanged in position. The heart is enlarged with  interval development of moderate central pulmonary venous congestion. Multifocal bilateral perihilar and lower lobe airspace opacification has  progressed as well. There are small bilateral pleural effusions, left  greater than right. Impression CHF with worsening multifocal pulmonary edema and or superimposed pneumonia. Assessment:         Lat Canthotomy. Plan:      Acute hypoxemia      Duoneb's      PEEP 10, fio2 50%   RR 16   Vt 450    bronchoscopy  Pending .  empiric antibiotics. Vanc merrem.  Start SBP.  chest X-ray continues to have opacity. Bronchoscopy yesterday without plugging but did have significant mucus. JEYSON    CRRT stopped    Making urine.  Cr. Improved. BP    Per CT surg   Starting nitro with stopping sedation.  Start nicardipine. .        Dissection   Type B.     CT surgery / Vascular following   Increased BP medications with stopping sedation . Prophylaxis   GI - pepcid     DVT - heparin     VAP - peridex   Nasal Decolonization - Bactroban    Nutrition  Diet Tube Feed Continuous/Cyclic w/ Diet    Intake/Output Summary (Last 24 hours) at 3/12/2020 1106  Last data filed at 3/12/2020 1039  Gross per 24 hour   Intake 3896.17 ml   Output 2355 ml   Net 1541.17 ml          Access  Arterial   Arterial Line 03/05/20 Right Brachial (Active)   Continued need for line? Yes 3/10/2020  6:00 AM   Site Assessment Clean;Dry; Intact 3/10/2020  6:00 AM   Line Status Arterial fluids per protocol; Intact and in place; Blood return noted; No blood noted in line 3/10/2020  6:00 AM   Art Line Waveform Appropriate;Square wave test performed 3/10/2020  6:00 AM   Art Line Interventions Leveled;Zeroed and calibrated; Flushed per protocol 3/9/2020  9:15 AM

## 2020-03-12 NOTE — PROGRESS NOTES
8187- Mediastinal chest tubes removed per order. Pt tolerated well with no complications. Dressing applied.

## 2020-03-12 NOTE — PROGRESS NOTES
Department of Internal Medicine  Nephrology Progress Note    SUBJECTIVE:  We are following this patient for JEYSON . Patient progress reviewed. HPI: He is a 18-year-old -American gentleman with a past medical history significant for obesity,  hypertension, admitted with chest pain. The patient had emergent CT scan of the chest with IV contrast that showed type A thoracic dissection extending down and involving the three major branches of the aortic arch. It was also extending down to the descending thoracic aorta, the true lumen involves both celiac and superior mesenteric arteries. The patient was taken to the OR and had a repair done 3/5/20, and also noted to have type B dissection. Postoperatively, the patient remained vent-dependent. At the time of presentation, he had a creatinine of 1.5 that had risen to 2.0. Renal consultation has been called .       Subjective:  Resting in bed; sedated; intubated - requiring Vent support      Physical Exam:    VITALS:  BP (!) 109/59   Pulse 97   Temp 100.8 °F (38.2 °C) (Axillary)   Resp 18   Ht 5' 11\" (1.803 m)   Wt 275 lb 2.2 oz (124.8 kg)   SpO2 99%   BMI 38.37 kg/m²   24HR INTAKE/OUTPUT:      Intake/Output Summary (Last 24 hours) at 3/12/2020 1017  Last data filed at 3/12/2020 0900  Gross per 24 hour   Intake 3616.17 ml   Output 2230 ml   Net 1386.17 ml     Patient Vitals for the past 96 hrs (Last 3 readings):   Weight   03/12/20 0502 275 lb 2.2 oz (124.8 kg)   03/11/20 0400 276 lb 7.3 oz (125.4 kg)   03/10/20 0500 278 lb (126.1 kg)       Constitutional:  On vent / sedated   Respiratory:  CTA but distant  Gastrointestinal:  Distended, hypoactive bowel Sounds  Cardiovascular: RRR   Edema:  Trace edema    Medications:   SMOG enema  330 mL Rectal Once    meropenem  500 mg Intravenous Q6H    vancomycin (VANCOCIN) intermittent dosing (placeholder)   Other RX Placeholder    sodium chloride flush  10 mL Intravenous 2 times per day    erythromycin   Left Eye Every Other Day    ipratropium-albuterol  1 ampule Inhalation Q8H    metoprolol tartrate  25 mg Oral BID    heparin (porcine)  5,000 Units Subcutaneous BID    aspirin  325 mg Oral Daily    sennosides  5 mL Per NG tube BID    pantoprazole  40 mg Intravenous Daily    And    sodium chloride (PF)  10 mL Intravenous Daily    chlorhexidine  15 mL Mouth/Throat BID       DATA:    CBC:  Lab Results   Component Value Date    WBC 9.1 03/12/2020    RBC 3.04 03/12/2020    HGB 8.8 03/12/2020    HCT 27.2 03/12/2020    MCV 89.3 03/12/2020    MCH 28.9 03/12/2020    MCHC 32.3 03/12/2020    RDW 13.8 03/12/2020     03/12/2020    MPV 9.3 03/12/2020     CMP:  Lab Results   Component Value Date     03/12/2020    K 4.6 03/12/2020     03/12/2020    CO2 25 03/12/2020    BUN 30 03/12/2020    CREATININE 1.1 03/12/2020    GFRAA >60 03/12/2020    LABGLOM >60 03/12/2020    GLUCOSE 95 03/12/2020    PROT 6.0 03/12/2020    CALCIUM 9.1 03/12/2020    BILITOT 0.8 03/12/2020    ALKPHOS 53 03/12/2020    AST 71 03/12/2020    ALT 21 03/12/2020        Phosphorus:   Lab Results   Component Value Date    PHOS 3.5 03/12/2020       ASSESSMENT:  Active Problems:    Ascending aortic aneurysm (HCC)    Dissection of thoracic aorta (HCC)    Dissecting aneurysm of thoracic aorta, [place] type A (HCC)    Acute respiratory failure with hypoxia (HCC)    Centrilobular emphysema (HCC)    JEYSON (acute kidney injury) ([facility] [address]  Resolved Problems:    * No resolved hospital problems. *      PLAN:  1. JEYSON - Most likely ATN / JOHNNY - Stopped CRRT 3/10/20 - Appears to have recovered kidney function. UO increased in the last 48 hours Hemodynamically stable. Check labs tomorrow in AM and decide on RRT  2. HTN - controlled. 3. Resp failure - on vent   4. Type A aortic dissection - S/p ascending aortic replacement - the patient also has Type B - per Vascular would not treat now with stent graft since all viscera well perfused.     Madelaine Larose MD

## 2020-03-13 NOTE — PROGRESS NOTES
Clinical Pharmacy Note  Vancomycin Consult    Anna Diaz is a 35 y.o. male ordered Vancomycin for empiric coverage; consult received from SANJEEV Mack to manage therapy. Also receiving meropenem. Patient Active Problem List   Diagnosis    Ascending aortic aneurysm ([address]    Dissection of thoracic aorta ([address]    Dissecting aneurysm of thoracic aorta, [place] type A ([address]    Acute respiratory failure with hypoxia ([address]    Centrilobular emphysema ([address]    JEYSON (acute kidney injury) ([address]       Allergies:  Ibuprofen     Temp max:  Temp (24hrs), Av.1 °F (38.4 °C), Min:99.4 °F (37.4 °C), Max:102.4 °F (39.1 °C)      Recent Labs     20  0359 20  0426 20  0404   WBC 11.0 9.1 19.4*       Recent Labs     20  0426 20  0404 20  1243   BUN 30* 59* 75*   CREATININE 1.1 2.9* 4.1*         Intake/Output Summary (Last 24 hours) at 3/13/2020 1509  Last data filed at 3/13/2020 1300  Gross per 24 hour   Intake 2822.22 ml   Output 523 ml   Net 2299.22 ml       Culture Results:  3/11/20 BAL: >100K normal respiratory harriett    Ht Readings from Last 1 Encounters:   20 5' 11\" (1.803 m)        Wt Readings from Last 1 Encounters:   20 286 lb 13.1 oz (130.1 kg)       Assessment/Plan:  Day # 3 of vancomycin. CRRT resumed this morning. Random vancomycin level 10.4 mg/L. Vancomycin 1500 mg IV x 1 today. Vancomycin random level tomorrow in AM.    Thank you for the consult. Will continue to follow.     Levon Hebert, PharmD, [facility]

## 2020-03-13 NOTE — CONSULTS
Daily  sennosides, 5 mL, BID  pantoprazole, 40 mg, Daily    And  sodium chloride (PF), 10 mL, Daily  chlorhexidine, 15 mL, BID      FLUIDS/DRIPS:     prismaSATE BGK 4/2.5 2,000 mL/hr (03/13/20 1047)    prismaSATE BGK 4/2.5 500 mL/hr (03/13/20 1048)    prismaSATE BGK 4/2.5 250 mL/hr (03/13/20 1048)    niCARdipine in NaCl Stopped (03/13/20 0235)    dexmedetomidine (PRECEDEX) IV infusion 0.4 mcg/kg/hr (03/13/20 1330)    nitroGLYCERIN Stopped (03/12/20 2146)    fentaNYL 100 mcg/hr (03/13/20 1030)    insulin 12.16 Units/hr (03/13/20 1300)    dextrose      propofol 40 mcg/kg/min (03/13/20 1205)     PRNs: potassium chloride, 20 mEq, PRN  magnesium sulfate, 1 g, PRN  calcium gluconate IVPB, 1 g, PRN    Or  calcium gluconate IVPB, 2 g, PRN    Or  calcium gluconate IVPB, 3 g, PRN    Or  calcium gluconate IVPB, 4 g, PRN  sodium phosphate IVPB, 6 mmol, PRN    Or  sodium phosphate IVPB, 12 mmol, PRN    Or  sodium phosphate IVPB, 18 mmol, PRN    Or  sodium phosphate IVPB, 24 mmol, PRN  heparin (porcine), 1,000 Units, PRN  sodium chloride flush, 10 mL, PRN  midazolam, 2 mg, Q2H PRN  fentanNYL, 25 mcg, Q1H PRN  perflutren lipid microspheres, 1.5 mL, ONCE PRN  oxyCODONE, 5 mg, Q4H PRN    Or  oxyCODONE, 10 mg, Q4H PRN  ondansetron, 4 mg, Q8H PRN  metoclopramide, 10 mg, Q6H PRN  hydrALAZINE, 5 mg, Q5 Min PRN  metoprolol, 2.5 mg, Q10 Min PRN  albuterol sulfate HFA, 2 puff, Q6H PRN  glucose, 15 g, PRN  dextrose, 12.5 g, PRN  glucagon (rDNA), 1 mg, PRN  dextrose, 100 mL/hr, PRN      ALLERGIES:  He   Allergies   Allergen Reactions    Ibuprofen        REVIEW OF SYSTEMS   Pertinent ROS noted in HPI    PHYSICAL EXAM     Vitals:    03/13/20 1216 03/13/20 1230 03/13/20 1300 03/13/20 1330   BP:       Pulse: 90 91 91 91   Resp: 19 20 24 24   Temp:       TempSrc:       SpO2: (!) 81% (!) 82% (!) 79%    Weight:       Height:           I/O last 3 completed shifts:   In: 3354.1 [I.V.:2223.1; NG/GT:831; IV Piggyback:300]  Out: 1320 [CPSBU:9839]      Physical Exam:  General appearance: intubated, sedated  Eyes: taped shut  Head: Normocephalic, without obvious abnormality  Lungs:Normal Effort  Heart: regular rate and rhythm, normal S1 and S2, no murmurs or rubs  Abdomen: soft, non-distended  Extremities: atraumatic, no cyanosis or edema  Skin: warm and dry, no jaundice  Neuro: Grossly intact, A&OX3      LABS AND IMAGING   Laboratory   Recent Labs     03/11/20 0359 03/12/20 0426 03/13/20  0404   WBC 11.0 9.1 19.4*   HGB 9.6* 8.8* 10.2*   HCT 29.7* 27.2* 31.9*   MCV 89.2 89.3 87.9    184 244     Recent Labs     03/11/20 0359 03/12/20 0426 03/13/20  0404    144 143   K 4.5 4.6 6.1*    107 106   CO2 25 25 20*   PHOS 3.7 3.5 6.7*   BUN 32* 30* 59*   CREATININE 1.3 1.1 2.9*     Recent Labs     03/11/20 0359 03/12/20 0426 03/13/20  0404   AST 96* 71* >7,000*   ALT 22 21 >7,000*   BILIDIR 0.4* 0.3 1.9*   BILITOT 0.9 0.8 3.2*   ALKPHOS 46 53 270*     No results for input(s): LIPASE, AMYLASE in the last 72 hours. Recent Labs     03/11/20 0359 03/12/20 0426 03/13/20  0404   PROTIME 14.3* 14.7* 19.4*   INR 1.23* 1.26* 1.66*       Imaging  CTA CHEST ABDOMEN PELVIS W CONTRAST   Preliminary Result   1. No significant change in appearance or extent of known [place] type B   thoracic abdominal aortic dissection. Patient has had a median sternotomy. Mediastinal tubes seen on prior CT of March 10, 2020 have been removed. Fluid in the periaortic recess is likely related to recent surgery. 2. Trace pericardial fluid. 3. Multi lobar pulmonary consolidations with collapse of the left lower lobe,   likely secondary to mucous plugging. Additional new multi lobar   peribronchovascular ground-glass opacities are suggestive of pneumonia. 4. Fluid-filled colon. Please correlate with clinical symptoms of diarrhea. There is mild pericolonic stranding at the level of the hepatic flexure. Differential includes colitis.    5. ETT tip 2 cm above the dinh. Enteric catheter tip is at the level of   the proximal duodenum. XR CHEST PORTABLE   Final Result   Decreased left lung opacity likely decreasing pleural effusion. XR CHEST PORTABLE   Final Result   1. Interval weighted feeding tube placement with tip likely terminating in   the distal stomach. 2. Recommend retracting endotracheal tube by 1 cm.   3. Interval removal of previously seen left-sided chest tubes. 4. No significant change in left-sided airspace disease. XR CHEST PORTABLE   Final Result   Essentially stable examination. Support tubes as above, in grossly unchanged   positioning. Persistent cardiomegaly and left perihilar/lower lobe airspace disease. Suspected left pleural effusion. XR CHEST PORTABLE   Final Result   CHF with worsening multifocal pulmonary edema and or superimposed pneumonia. CTA CHEST ABDOMEN PELVIS W CONTRAST   Final Result   Unchanged appearance of the now type B dissection as discussed. Small amount of fluid on both sides of the ascending aorta presumed to be   related to the surgery, fluid contiguous with the pericardial sac but no   pericardial effusion. The right-sided drainage catheter is in contact with   the fluid along its inferior margin. Large amount of collapse/atelectasis of the left lung. Small amount of   right-sided atelectasis also present. No pneumothorax or pleural effusion         CTA HEAD W WO CONTRAST   Final Result   1. No CT evidence of acute intracranial abnormalities. 2. No high-grade stenosis or focal occlusion involving the intracranial   vasculature. 3. A 3 mm outpouching projecting laterally from cavernous segment of the   right internal carotid artery. This is most concerning for a small aneurysm. Infundibulum is less likely. 4. Linear short segment hypodensity within the petrous segment of right   internal carotid artery is most likely a fenestration. (tyelonol) or acute viral hepatitis. HBs Ag neg (3/10/20). Will check other viral serologies . No heavy tylenol administration during admission. No other concerning medications. Suspecting ischemic hepatitis at this point. CTA shows hepatic steatosis, normal gallbladder. Will discuss case with Dr. Jonathan Flores. Will monitor trend. Management of ischemic hepatitis is supportive care and reversing/managing the underlying cause of hemodynamic instability. If you have any questions or need any further information, please feel free to contact our consult team.  Thank you for allowing us to participate in the care of [facility]. The note was completed using Dragon voice recognition transcription. Every effort was made to ensure accuracy; however, inadvertent transcription errors may be present despite my best efforts to edit errors. Jerardo Hanks PA-C    Attending physician addendum:    I have personally seen and examined the patient, reviewed the patient's medical record and pertinent labs and clinical imaging. I have personally staffed the case with Jerardo WALSH. I agree with her consultation note, exam findings, assessment and plans  as written above. I have made appropriate modifications and edited her assessment and plan where needed to reflect my impression and plans for this patient. [facility] is a 35 y.o. male with a PMH of HTN who presented on 3/5/2020 with acute onset abdominal pain, back pain. CT chest showed aortic dissection and he was taken to OR for emergent repair. We have been consulted for elevated LFTs. Limited DDX for LFT in the 1000s. DDX Ischemic Hepatitis, Tylenol Overdose, or Acute Viral Hepatitis. Review of BPs shows some episodes of hypotension to the  systolic range over the past 24 hours and concern for ischemic hepatitis. Patient has had uncontrolled HTN and even BPs in the \"normal range\" could cause ischemia.  CTA today showed patency of the

## 2020-03-13 NOTE — PROGRESS NOTES
Patient respiratory rate 25-35 and sats 85%. PRN fentanyl given for increased RR. Patient agitated and coughing against vent as well. HR and BP stable.

## 2020-03-13 NOTE — PROGRESS NOTES
Department of Internal Medicine  Nephrology Progress Note    SUBJECTIVE:  We are following this patient for JEYSON . Patient progress reviewed. HPI: He is a 79-year-old -American gentleman with a past medical history significant for obesity,  hypertension, admitted with chest pain. The patient had emergent CT scan of the chest with IV contrast that showed type A thoracic dissection extending down and involving the three major branches of the aortic arch. It was also extending down to the descending thoracic aorta, the true lumen involves both celiac and superior mesenteric arteries. The patient was taken to the OR and had a repair done 3/5/20, and also noted to have type B dissection. Postoperatively, the patient remained vent-dependent. At the time of presentation, he had a creatinine of 1.5 that had risen to 2.0. Renal consultation has been called .       Subjective:  Resting in bed; sedated; intubated - requiring Vent support      Physical Exam:    VITALS:  BP (!) 109/59   Pulse 89   Temp 99.8 °F (37.7 °C) (Rectal)   Resp 17   Ht 5' 11\" (1.803 m)   Wt 286 lb 13.1 oz (130.1 kg)   SpO2 (!) 82%   BMI 40.00 kg/m²   24HR INTAKE/OUTPUT:      Intake/Output Summary (Last 24 hours) at 3/13/2020 5317  Last data filed at 3/13/2020 0600  Gross per 24 hour   Intake 3294.11 ml   Output 1010 ml   Net 2284.11 ml     Patient Vitals for the past 96 hrs (Last 3 readings):   Weight   03/13/20 0615 286 lb 13.1 oz (130.1 kg)   03/12/20 0502 275 lb 2.2 oz (124.8 kg)   03/11/20 0400 276 lb 7.3 oz (125.4 kg)       Constitutional:  On vent / sedated   Respiratory:  CTA but distant  Gastrointestinal:  Distended, hypoactive bowel Sounds  Cardiovascular: RRR   Edema:  Trace edema    Medications:   meropenem  500 mg Intravenous Q6H    vancomycin (VANCOCIN) intermittent dosing (placeholder)   Other RX Placeholder    sodium chloride flush  10 mL Intravenous 2 times per day    erythromycin   Left Eye Every Other Day    with stent graft since all viscera well perfused.     Tommie De Leon MD

## 2020-03-13 NOTE — PROGRESS NOTES
2111: Patients saturations decreasing to 86-88%. Resp Rate increasing to 36-38 bpm. STAT ABG drawn. Notified Dr. Jenny Parker of results. Ordered to increase patient's PEEP to 12 and increase patient's sedation in order to slow patient's breathing and improve patient's oxygenation. Increasing patient's propofol. Titrating Nicardipine and Nitro drip to meet hemodynamic goal of a systolic of less than 382.     2126: Patient's sat continue to decrease. Respiratory rate increasing. Notified Dr. Jenny Parker, ordered to restart fentanyl drip and increase sedation for patient. O2 sat at 86%. 2204: Paged CT surgery to updated on status. Dr. Raquel Schofield notified of recent oxygenation trouble and hemodynamic status. No new orders at this time. 2219: Repiratory at bedside. Patient's O2 sats increasing to 88%    0105: FIO2 decreased to 90%. Patient's saturations 91.

## 2020-03-13 NOTE — PROGRESS NOTES
VASCULAR    Attempted wean for extubation yesterday without success. Transient hypertension during that period. 117/67  89 Tmax 102  UO has dramatically dropped to 105 cc last 8 hours  Abd soft, nl BS  Warm B feet with good doppler signals - unable to palpate pulses    Labs reviewed  Creat 2.9  LFTS >7000    A/P: S/P ascending aortic replacement with type B aortic dissection   Concern for worsened dissection with severe hypertension yesterday that may explain LFTs and sudden creat increase.    Only way to document is repeat CTA with associated risk of worsened renal function with contrast.   Will discuss with renal.     Franklin Bowers

## 2020-03-13 NOTE — PROGRESS NOTES
Morning ABG PO2 188. Patients current O2Sat% is 85. Changed patients O2 monitor to different fingers/toes. All reading low 80s O2 sats. Patient has warm extremities and cap refill.

## 2020-03-13 NOTE — PLAN OF CARE
pain.    Goal: Control of acute pain  Description: Control of acute pain  Outcome: Ongoing     Problem: Tissue Perfusion - Cardiopulmonary, Altered:  Goal: Will show no evidence of cardiac arrhythmias  Description: Will show no evidence of cardiac arrhythmias  Outcome: Ongoing  Note: A: Continuous cardiac rhythm monitor. Initiate arrhythmia protocol orders for Amiodarone if patient converts to arterial fib. Problem: Infection - Ventilator-Associated Pneumonia:  Goal: Prevent a ventilator associated event (VAE)  Description: Prevent a ventilator associated event (VAE)  Outcome: Ongoing  Note: A: Assess readiness to wean. Assess and communicate tolerance of ventilator settings; Collaborate with RT and pulmonologist.  RT to monitor and manage cuff pressure. Assess for signs/symptoms of infection. HOB 30 degrees. Routine oral care. Suction as needed, assess quality and quantity of secretions. GI prophylaxis as ordered. Manage sedation as ordered to keep RASS in ordered parameters     Goal: Absence of pulmonary infection  Description: Absence of pulmonary infection  Outcome: Ongoing     Problem: Venous Thromboembolism:  Goal: Will show no signs or symptoms of venous thromboembolism  Description: Will show no signs or symptoms of venous thromboembolism  Outcome: Ongoing  Note: A: Assess and educate for signs/symptoms of VTE, PE or bleeding. Compression stockings, sequential compression device, promote progress activity plan.     Goal: Absence of signs or symptoms of impaired coagulation  Description: Absence of signs or symptoms of impaired coagulation  Outcome: Ongoing  Note: A: Assess and educate for signs/symptoms of excessive bleeding, impaired coagulation       Problem: Infection - Central Venous Catheter-Associated Bloodstream Infection:  Goal: Will show no infection signs and symptoms  Description: Will show no infection signs and symptoms  Outcome: Ongoing  Note: A: Assess need for continued use of central line. Assess and educate for signs/symptoms of infection. Occlusive dressing with antimicrobial patch in place. Alcohol swab cap on unused port. Aseptic technique when accessing ports. Assess surgical site and educate for sign/symptoms of infection. Problem: Urinary Elimination:  Goal: Complications related to the disease process, condition or treatment will be avoided or minimized  Description: Complications related to the disease process, condition or treatment will be avoided or minimized  Outcome: Ongoing  Note: A: Assess need for continued use. Assess and educate for signs/symptoms of infection. Monitor quality and quantity of urine output. Stat lock in place, drainage bag hanging below level of bladder, routine catheter care. Problem: Infection - Surgical Site:  Goal: Will show no infection signs and symptoms  Description: Will show no infection signs and symptoms  Outcome: Ongoing  Note: A: Assess need for continued use of central line. Assess and educate for signs/symptoms of infection. Occlusive dressing with antimicrobial patch in place. Alcohol swab cap on unused port. Aseptic technique when accessing ports. Assess surgical site and educate for sign/symptoms of infection. Problem: Nutrition  Goal: Optimal nutrition therapy  3/12/2020 2039 by Catherine Salmeron RN  Outcome: Ongoing  Note: A: Tube feeding as ordered. Nutritional consult. 3/12/2020 1248 by Casimiro Soulier, RD, LD  Outcome: Ongoing     Problem: Falls - Risk of:  Goal: Will remain free from falls  Description: Will remain free from falls  Outcome: Ongoing  Note: A: Sound com dome light, fall risk arm band, bed/chair alarm, bed in lowest position, wheels of bed/chair locked, call light in reach, fall precaution education, intentional rounding.  PT/OT eval/treat when appropriate    Goal: Absence of physical injury  Description: Absence of physical injury  Outcome: Ongoing     Problem: Fluid Volume - Imbalance:  Goal:

## 2020-03-13 NOTE — PROGRESS NOTES
Pulmonary Progress Note    Date of Admission: 3/5/2020   LOS: 8 days       CC:  dissection    Subjective:           significant decline over night. SBT yesterday with poor response. agitated and blood pressure issues. Decreased lower extremities pulses. ROS:   Unable to assess     PHYSICAL EXAM:   Blood pressure (!) 109/59, pulse 90, temperature 99.8 °F (37.7 °C), temperature source Rectal, resp. rate 17, height 5' 11\" (1.803 m), weight 286 lb 13.1 oz (130.1 kg), SpO2 (!) 82 %.'  Gen: No distress. Eyes: left eye covered. ENT: No discharge. Pharynx clear. External appearance of ears and nose normal.  ett  Neck: Trachea midline. No obvious mass. Resp: No accessory muscle use. No crackles. No wheezes. No rhonchi. CV: Regular rate. Regular rhythm. No murmur or rub. No edema. GI: Non-tender. Non-distended. No hernia. Skin: Warm, dry, normal texture and turgor. No nodule on exposed extremities. Lymph: No cervical LAD. No supraclavicular LAD. M/S: No cyanosis. No clubbing. No joint deformity.     Neuro:    Psych:        Medications:    Scheduled Meds:   vancomycin  1,500 mg Intravenous Once    meropenem  500 mg Intravenous Q6H    vancomycin (VANCOCIN) intermittent dosing (placeholder)   Other RX Placeholder    sodium chloride flush  10 mL Intravenous 2 times per day    erythromycin   Left Eye Every Other Day    ipratropium-albuterol  1 ampule Inhalation Q8H    metoprolol tartrate  25 mg Oral BID    heparin (porcine)  5,000 Units Subcutaneous BID    aspirin  325 mg Oral Daily    sennosides  5 mL Per NG tube BID    pantoprazole  40 mg Intravenous Daily    And    sodium chloride (PF)  10 mL Intravenous Daily    chlorhexidine  15 mL Mouth/Throat BID       Continuous Infusions:   prismaSATE BGK 4/2.5 2,000 mL/hr (03/13/20 1047)    prismaSATE BGK 4/2.5 500 mL/hr (03/13/20 1048)    prismaSATE BGK 4/2.5 250 mL/hr (03/13/20 1048)    niCARdipine in NaCl Stopped (03/13/20 7229)    dexmedetomidine (PRECEDEX) IV infusion 0.9 mcg/kg/hr (03/13/20 1106)    nitroGLYCERIN Stopped (03/12/20 2146)    fentaNYL 100 mcg/hr (03/13/20 1030)    insulin 9.3 Units/hr (03/13/20 1058)    dextrose      propofol 40 mcg/kg/min (03/13/20 0829)       PRN Meds:  potassium chloride, magnesium sulfate, calcium gluconate IVPB **OR** calcium gluconate IVPB **OR** calcium gluconate IVPB **OR** calcium gluconate IVPB, sodium phosphate IVPB **OR** sodium phosphate IVPB **OR** sodium phosphate IVPB **OR** sodium phosphate IVPB, heparin (porcine), sodium chloride flush, midazolam, fentanNYL, perflutren lipid microspheres, oxyCODONE **OR** oxyCODONE, ondansetron, metoclopramide, hydrALAZINE, metoprolol, albuterol sulfate HFA, glucose, dextrose, glucagon (rDNA), dextrose    Labs reviewed:  CBC:   Recent Labs     03/11/20 0359 03/12/20 0426 03/13/20  0404   WBC 11.0 9.1 19.4*   HGB 9.6* 8.8* 10.2*   HCT 29.7* 27.2* 31.9*   MCV 89.2 89.3 87.9    184 244     BMP:   Recent Labs     03/11/20 0359 03/12/20 0426 03/13/20  0404    144 143   K 4.5 4.6 6.1*    107 106   CO2 25 25 20*   PHOS 3.7 3.5 6.7*   BUN 32* 30* 59*   CREATININE 1.3 1.1 2.9*     LIVER PROFILE:   Recent Labs     03/11/20 0359 03/12/20 0426 03/13/20  0404   AST 96* 71* >7,000*   ALT 22 21 >7,000*   BILIDIR 0.4* 0.3 1.9*   BILITOT 0.9 0.8 3.2*   ALKPHOS 46 53 270*     PT/INR:   Recent Labs     03/11/20 0359 03/12/20 0426 03/13/20  0404   PROTIME 14.3* 14.7* 19.4*   INR 1.23* 1.26* 1.66*     APTT:   Recent Labs     03/11/20  0359 03/12/20  0426 03/13/20  0404   APTT 25.9 26.9 31.1     UA:No results for input(s): NITRITE, COLORU, PHUR, LABCAST, WBCUA, RBCUA, MUCUS, TRICHOMONAS, YEAST, BACTERIA, CLARITYU, SPECGRAV, LEUKOCYTESUR, UROBILINOGEN, BILIRUBINUR, BLOODU, GLUCOSEU, AMORPHOUS in the last 72 hours. Invalid input(s): Mariusz Perez  No results for input(s): PH, PCO2, PO2 in the last 72 hours.     Cx:      Films:  Radiology

## 2020-03-13 NOTE — PROGRESS NOTES
Spoke with Dr. Nba Alcantara regarding patient's morning labs, potassium and increase in creatine. Ordered to give 1 amp of sodium bicarbonate. Will continue to monitor. 0008: Dr. Frank Joy made aware of patient's morning arterial blood gas. Patient's finger probe O2 saturation reading 85-86%. While PO2 on ABG reading 188. Will continue to monitor.

## 2020-03-13 NOTE — PROGRESS NOTES
APTT:   Recent Labs     03/11/20  0359 03/12/20  0426 03/13/20  0404   APTT 25.9 26.9 31.1       Assessment/Plan:  CV - SR.    - antihypertensives. po meds per tube - BB  vasc - suspect progression of dissection with htn yesterday during vent wean. reimage. pulm - intubated. LLL consolidation. No better. ID - afeb. wbc nl   - bronch cx 3/11 neg so far. Empiric abx  Renal - JEYSON, worse. U/o low. Likely due to change in flap   - retain Dillon for accurate I+O  GI - TF. kaofeed - tip postpyloric  Heme - acute blood loss anemia. Stable. - scds, sc hep  ophtho - lat canthotomy. Appreciate ophthalmology input 3/9.       Yanick Garcia MD  3/13/2020  7:29 AM

## 2020-03-14 NOTE — PROGRESS NOTES
Clinical Pharmacy Note  Vancomycin Consult    Cr Rivera is a 35 y.o. male ordered Vancomycin for empiric coverage; consult received from SANJEEV El to manage therapy. Also receiving meropenem. Patient Active Problem List   Diagnosis    Ascending aortic aneurysm ([address]    [facility]section [facility]    Dissecting aneurysm of thoracic aorta, Rigo type A ([address]    Acute respiratory failure with hypoxia (HCC)    Centrilobular emphysema ([address]    JEYSON (acute kidney injury) ([facility]       Allergies:  Ibuprofen     Temp max:  Temp (24hrs), Av.3 °F (37.4 °C), Min:98 °F (36.7 °C), Max:102 °F (38.9 °C)      Recent Labs     20  0426 20  0404 20  0555   WBC 9.1 19.4* 54.1*       Recent Labs     20  1725 20  0250 20  0814   BUN 74* 71* 65*   CREATININE 3.4* 3.5* 3.3*         Intake/Output Summary (Last 24 hours) at 3/14/2020 1025  Last data filed at 3/14/2020 1000  Gross per 24 hour   Intake 3568.22 ml   Output 3415 ml   Net 153.22 ml       Culture Results:  3/11/20 BAL: >100K normal respiratory harriett    Ht Readings from Last 1 Encounters:   20 5' 11\" (1.803 m)        Wt Readings from Last 1 Encounters:   20 277 lb 5.4 oz (125.8 kg)       Assessment/Plan:  Day # 4 of vancomycin. Patient remains on CRRT. Random vancomycin level 15.5 ug/mL this morning. Vancomycin 1,250 mg IVPB x 1 dose now. Random Vancomycin level ordered for 03/15/20 with AM labs. Thank you for the consult. Will continue to follow.     Lorrie Liu, YocastaD, BCPS  3/14/2020 10:26 AM

## 2020-03-14 NOTE — CONSULTS
Intravenous 2 times per day    erythromycin   Left Eye Every Other Day    ipratropium-albuterol  1 ampule Inhalation Q8H    metoprolol tartrate  25 mg Oral BID    heparin (porcine)  5,000 Units Subcutaneous BID    aspirin  325 mg Oral Daily    sennosides  5 mL Per NG tube BID    pantoprazole  40 mg Intravenous Daily    And    sodium chloride (PF)  10 mL Intravenous Daily    chlorhexidine  15 mL Mouth/Throat BID       Allergies:  Ibuprofen    Social History:  Per EPIC  TOBACCO:    Hx cig  ETOH:    Rare   DRUGS:   Marijuana  MARITAL STATUS:   Single     Family History:   No immunodeficiency    REVIEW OF SYSTEMS:    Unable to obtain    PHYSICAL EXAM:      Vitals:    BP (!) 134/56   Pulse 84   Temp 99.5 °F (37.5 °C) (Rectal)   Resp 20   Ht 5' 11\" (1.803 m)   Wt 277 lb 5.4 oz (125.8 kg)   SpO2 (!) 66%   BMI 38.68 kg/m²     GENERAL: On vent / CRRT  HEENT: ETT  NECK:  Supple  LUNGS: Clear b/l  CARDIAC: RRR, systolic murmur appreciated  ABD:  No BS, soft / NT  EXT:  Edema, tattoos; midsternal wound with dressing   NEURO: Unable to assess   LINES:  R fem HD, R brachial A-line, R IJ line     DATA:    Lab Results   Component Value Date    WBC 54.1 (HH) 03/14/2020    HGB 9.0 (L) 03/14/2020    HCT 26.6 (L) 03/14/2020    MCV 89.0 03/14/2020     03/14/2020     Lab Results   Component Value Date    CREATININE 3.6 (H) 03/14/2020    BUN 67 (H) 03/14/2020     (L) 03/14/2020    K 7.7 (HH) 03/14/2020    K 7.7 (HH) 03/14/2020    CL 96 (L) 03/14/2020    CO2 15 (L) 03/14/2020       Hepatic Function Panel:   Lab Results   Component Value Date    ALKPHOS 389 03/14/2020    ALT 5,488 03/14/2020    AST >7,000 03/14/2020    PROT 6.1 03/14/2020    BILITOT 6.2 03/14/2020    BILIDIR 4.3 03/14/2020    IBILI 1.9 03/14/2020    LABALBU 2.2 03/14/2020       Micro:  2/12 BC no growth to date  3/11 BAL normal resp harriett, rare C albicans  3/11 Nasal MRSA probe neg    Imaging:   3/14 Head CT 'no acute intracranial abnormality'  3/14 CXR 'Stable small left pleural effusion with left basilar atelectasis and consolidation.'    3/13 Chest / Abd / Pelvic CT / CTA:  1. No significant change in appearance or extent of known Rigo type B  thoracic abdominal aortic dissection.  Patient has had a median sternotomy. Mediastinal tubes seen on prior CT of March 10, 2020 have been removed. Fluid in the periaortic recess is likely related to recent surgery. 2. Trace pericardial fluid. 3. Multi lobar pulmonary consolidations with collapse of the left lower lobe,  likely secondary to mucous plugging.  Additional new multi lobar  peribronchovascular ground-glass opacities are suggestive of pneumonia. 4. Fluid-filled colon.  Please correlate with clinical symptoms of diarrhea. There is mild pericolonic stranding at the level of the hepatic flexure. Differential includes colitis. 5. ETT tip 2 cm above the dinh.  Enteric catheter tip is at the level of  the proximal duodenum. IMPRESSION:      Patient Active Problem List   Diagnosis    Ascending aortic aneurysm ([address]    Dissection of thoracic aorta ([address]    Dissecting aneurysm of thoracic aorta, [place] type A ([address]    Acute respiratory failure with hypoxia ([address]    Centrilobular emphysema ([address]    JEYSON (acute kidney injury) ([address]       Hx obesity, HTN. Type A dissection - OR 3/5 - repair ascending Ao dissection, Hemashiel tub graft, AoVR  Type B dissection. Resp failure.     JEYSON / Renal failure    Pul densities - BAL normal harriett, little lower resp secretions per RN    Leukocytosis - leukemoid reaction due to extreme physiologic stress, not clear there is active infection    RECOMMENDATIONS:    Cont empiric antibiotics - vanc / meropenem  Supportive care  Reviewed notes - nursing, Pul/CC, CT surg, Renal, Vascular    Discussed with KOFI Fields MD

## 2020-03-14 NOTE — PROGRESS NOTES
CKTOTAL, CKMB, CKMBINDEX, TROPONINI in the last 72 hours.   PT/INR:   Recent Labs     03/12/20  0426 03/13/20  0404 03/14/20  0555   PROTIME 14.7* 19.4* 16.4*   INR 1.26* 1.66* 1.41*     APTT:   Recent Labs     03/12/20  0426 03/13/20  0404 03/14/20  0555   APTT 26.9 31.1 26.3       Assessment/Plan:  Stable from cardiac standpoint  Patient suffering from sequela of malperfusion's will be addressed by vascular surgery  Hold tube feed for now    Alexy Correa MD  3/14/2020  11:26 AM

## 2020-03-14 NOTE — PROGRESS NOTES
Occupational Therapy  Unable to see pt at this time due to pt still currently intubated at this time. Will follow up with pt as medically appropriate.     Aba Hernandez, OTR/L

## 2020-03-14 NOTE — PROGRESS NOTES
@0715-Bedside handoff completed with [facility], RN. Pt in bed with eyes closed. Responds to pain. Bilateral soft wrist restraints in place. Asia Hartman, Nephrology at bedside for AM rounds. Changes made to CRRT settings and solutions. See orders. @0800-Maryam Nguyen, Gastroenterology at bedside for AM rounds. No new orders at this time. @0810-CRRT setting changes made, Pre fluid rate increased to 1000 mL/hr.    @0824-Dialysate and Replacement fluid bags changed to [address] 4/2.5. See MAR.    @0827-Call received from pt's fiance  @0830-Shift assessment completed, see flow sheet. Vital signs stable. SR on monitor. BP stable, remains off of Nitro and Cardene gtts. Withdraws from pain. Doppler pedal and post-tib pulses. Post-tib doppler pulses weaker than pedal.  Active bowel sounds. Lung sounds diminished. Minimal ETT secretions. Moderate amount of oral and nasal yellow/thick secretions, requiring frequent suctioning and oral care. Pt turned and repositioned, tolerated fairly well. Oral care performed. Bilateral wrist restraints remain in place for pt safety. @0845-ZULMA Souza rounding on unit. RN discussed pt care. Informed this RN that 1 family member is permitted to see pt at a time for very short visits once per day. @0900-Call received from pt's mother, Teresa Martínez. Updated on pt's status and questions answered. Also, informed her of visiting policy, verbalizes understanding. Informed this RN that she and pt's Marissa Hoang would be up today. Both will be visiting today, one-at-a-time for a very short time. RN verbalizes understanding. No further questions at this time. @3964-Geeta Fox, Vascular Surgery at bedside for AM rounds. MD concerned about hepatic and renal function and lower extremities. Discussion of TEVR with stenting, unsure if will improve perfusion. Informed RN will discuss with pt's mother (POA). @1034-Call returned from [place], [place]. Updated on pt's status, lab values, current lines/tubes. All questions answered. Telephone order for C-Diff and Consult to Infectious Disease. @1100-Dr. Tom Swann, CTS at bedside for AM rounds. Verbal order to stop continuous tube feed at this time. @1300-Pt non-responsive to pain. No gag or cough present. Full assessment completed, see flow sheet. Vital signs stable. Lung sounds remain diminished. Large amounts of oral and nasal secretions. Mucous thick/yellow and foul smelling. Frequent suctioning required along with oral care. @1320-All sedation turned off at this time. Dr. Anna Pang at bedside for rounding. Witnessed unresponsiveness by pt. Placed order for head CT without contrast, see orders. @1345-Cardene gtt restarted. @1420-Pt starting to open eyes and flutter. Still not responding to pain. Gag and cough now present. Blood returned from CRRT cartridge. Disconnected from pt d/t transport to CT.    @1419-Nitro gtt restarted. @1440-Head CT without contrast completed. @1551-5mg PRN Hydralazine admin for SBP>140 mmHg. @1557-5mg PRN Hydralazine admin for SBP>140 mmHg. @1618-Cardene gtt stopped. @1640-Nitro gtt stopped. @1645-Telly Chopra at bedside to update family about surgery and pt's status. In room to evaluate pt, aware of ST elevation. No new orders. @1715-Call placed to Nephrology. Waiting for call. @1722-Call returned from Dr. Francisco J Thompson, Nephrology. Updated on pt's status. Will call Neprhologist who as seen pt today, Dr. Sergei Wray. @1733-Call from Dr. Sergei Wray, Nephrology. Updated on pt's status, vitals, and lab results. All questions answered. See orders. @1730-María Macedo Infectious Disease at bedside for consultation. All questions answered. No new orders at this time. @1755-Propofol gtt stopped. @1759-Call returned from [address]. Updated on pt's vital signs and questions answered. Propofol gtt has been stopped. See orders for Levophed gtt. @1800-Assessment remains unchanged from previous. Vital signs remain stable. ADRIANA -4. CPOT 0. Pt tolerating vent well. Oral care performed. Call from Dr. Brennan Archultea, Nephrology, see orders. @1812-Levophed gtt started at 2 mcg/min. @4629-082 mEq Sodium Bicarb IVP given. See MAR.    @1823-Labs drawn and sent. POC potassium and glucose. Blood glucose 25. @1825-Insulin gtt stopped. @1833-12.5 g Dextrose 50% admin for low blood glucose. @1843-Blood glucose recheck 101.    @1920-Hemodialysis started. Bedside handoff given to oncoming RNShyann to assume care.     Electronically signed by Jay Medeiros RN on 3/14/2020 at 7:45 PM

## 2020-03-14 NOTE — PROGRESS NOTES
VASCULAR    Intubated & sedated. No HTN meds IV.    119/63  78 Tmax 102  I/O +681 (UO 39 cc 24 hr - on CRRT)  Lungs clear  Abd soft - tolerating TFs  B feet cool with easily audible dopplers but no palpable pulses    Labs reviewed    A/P: S/P ascending arch replacement with Type B dissection   Worsened clinically since 36 hours ago. Renal failure (anuric) and shock liver. Appears that true lumen is more compromised. Will consider TEVR with stenting as needed, Unsure if this will improve visceral perfusion or change outcome. Will discuss with POA (mother). Nilda Najera     Discussed with family. Plan with stent graft discussed. Made clear that this may not result in reversal of end organ failure or even survival.   Family wishes to proceed. Will need to get K lower for anesthesia - will discuss with anesthesia. Will also place new line for HD intraop.      Nilda Najera

## 2020-03-14 NOTE — PROGRESS NOTES
well perfused. 5. Hyperphosphatemia expect improvement with CRRT  6. Hyperkalemia increase effluent rate. Follow renal panel.      Violeta Alvarado MD

## 2020-03-14 NOTE — PLAN OF CARE
Problem: Restraint Use - Nonviolent/Non-Self-Destructive Behavior:  Goal: Absence of restraint indications  Note: Restraints remain in place to prevent removal of medical equipment. No injury noted. Problem: Fluid Volume - Imbalance:  Goal: Ability to achieve a balanced intake and output will improve  Note: Re-started on CRRT, tolerating well.  Goal net even fluid removal.     Problem: Gas Exchange - Impaired:  Goal: Ability to maintain adequate ventilation will improve  Note: Remains on ventilator, weaning FiO2 to keep PaO2 greater than 100

## 2020-03-14 NOTE — PROGRESS NOTES
VASCULAR    Had planned to take to the OR for TEVAR for malperfusion. Despite aggressive CRRT and other measures have been unable to control potassium level with most recent climbing to over 7. Based on discussions with anesthesia it is not possible to proceed with anesthesia due to risk of cardiac arrest.  Other signs of significant hepatic ischemia also seen. CT head without acute neuro event. At this time will not attempt endovascular treatment of dissection. Will resume maximal medical management with aggressive BP management with renal and ventilator support. Will discuss with family including code status. Prognosis poor.     Dana Nettles

## 2020-03-14 NOTE — PROGRESS NOTES
%    TCO2, Arterial 23.8 Not Established mmol/L    O2 Content, Arterial 11 Not Established mL/dL    O2 Therapy Unknown    Basic Metabolic Panel    Collection Time: 03/13/20 12:43 PM   Result Value Ref Range    Sodium 142 136 - 145 mmol/L    Potassium 5.7 (H) 3.5 - 5.1 mmol/L    Chloride 107 99 - 110 mmol/L    CO2 20 (L) 21 - 32 mmol/L    Anion Gap 15 3 - 16    Glucose 135 (H) 70 - 99 mg/dL    BUN 75 (H) 7 - 20 mg/dL    CREATININE 4.1 (H) 0.9 - 1.3 mg/dL    GFR Non- 16 (A) >60    GFR  20 (A) >60    Calcium 8.3 8.3 - 10.6 mg/dL   Hepatic Function Panel    Collection Time: 03/13/20 12:43 PM   Result Value Ref Range    Total Protein 5.8 (L) 6.4 - 8.2 g/dL    Alb 2.3 (L) 3.4 - 5.0 g/dL    Alkaline Phosphatase 298 (H) 40 - 129 U/L    ALT >7,000 (H) 10 - 40 U/L    AST >7,000 (H) 15 - 37 U/L    Total Bilirubin 3.8 (H) 0.0 - 1.0 mg/dL    Bilirubin, Direct 2.6 (H) 0.0 - 0.3 mg/dL    Bilirubin, Indirect 1.2 (H) 0.0 - 1.0 mg/dL   POCT Glucose    Collection Time: 03/13/20  1:02 PM   Result Value Ref Range    POC Glucose 136 (H) 70 - 99 mg/dl    Performed on ACCU-CHEK    POCT Glucose    Collection Time: 03/13/20  2:11 PM   Result Value Ref Range    POC Glucose 108 (H) 70 - 99 mg/dl    Performed on ACCU-CHEK    POCT Glucose    Collection Time: 03/13/20  3:18 PM   Result Value Ref Range    POC Glucose 96 70 - 99 mg/dl    Performed on ACCU-CHEK    Ammonia    Collection Time: 03/13/20  3:19 PM   Result Value Ref Range    Ammonia 114 (HH) 16 - 60 umol/L   Renal Function Panel    Collection Time: 03/13/20  5:25 PM   Result Value Ref Range    Sodium 141 136 - 145 mmol/L    Potassium 6.1 (HH) 3.5 - 5.1 mmol/L    Chloride 104 99 - 110 mmol/L    CO2 20 (L) 21 - 32 mmol/L    Anion Gap 17 (H) 3 - 16    Glucose 111 (H) 70 - 99 mg/dL    BUN 74 (H) 7 - 20 mg/dL    CREATININE 3.4 (H) 0.9 - 1.3 mg/dL    GFR Non-African American 20 (A) >60    GFR  24 (A) >60    Calcium 8.3 8.3 - 10.6 mg/dL Phosphorus 6.6 (H) 2.5 - 4.9 mg/dL    Alb 2.5 (L) 3.4 - 5.0 g/dL   Lactic Acid, Plasma    Collection Time: 03/13/20  5:25 PM   Result Value Ref Range    Lactic Acid 1.4 0.4 - 2.0 mmol/L   Calcium, Ionized    Collection Time: 03/13/20  5:25 PM   Result Value Ref Range    Calcium, Ion 1.12 1.12 - 1.32 mmol/L    pH, Carlos 7.331 (L) 7.350 - 7.450   Magnesium    Collection Time: 03/13/20  5:25 PM   Result Value Ref Range    Magnesium 2.50 (H) 1.80 - 2.40 mg/dL   Blood Gas, Arterial    Collection Time: 03/13/20  5:25 PM   Result Value Ref Range    pH, Arterial 7.331 (L) 7.350 - 7.450    pCO2, Arterial 43.1 35.0 - 45.0 mmHg    pO2, Arterial 143.0 (H) 75.0 - 108.0 mmHg    HCO3, Arterial 22.8 21.0 - 29.0 mmol/L    Base Excess, Arterial -3.0 -3.0 - 3.0 mmol/L    Hemoglobin, Art, Extended 9.0 (L) 13.5 - 17.5 g/dL    O2 Sat, Arterial 98.7 >92 %    Carboxyhgb, Arterial 0.7 0.0 - 1.5 %    Methemoglobin, Arterial 10.1 (H) <1.5 %    TCO2, Arterial 24.1 Not Established mmol/L    O2 Content, Arterial 12 Not Established mL/dL    O2 Therapy Unknown    POCT Glucose    Collection Time: 03/13/20  5:26 PM   Result Value Ref Range    POC Glucose 116 (H) 70 - 99 mg/dl    Performed on ACCU-CHEK    POCT Glucose    Collection Time: 03/13/20  7:33 PM   Result Value Ref Range    POC Glucose 132 (H) 70 - 99 mg/dl    Performed on ACCU-CHEK    Potassium    Collection Time: 03/13/20  7:50 PM   Result Value Ref Range    Potassium 6.0 (HH) 3.5 - 5.1 mmol/L   POCT Glucose    Collection Time: 03/13/20  8:07 PM   Result Value Ref Range    POC Glucose 128 (H) 70 - 99 mg/dl    Performed on ACCU-CHEK    POCT Glucose    Collection Time: 03/13/20  9:05 PM   Result Value Ref Range    POC Glucose 139 (H) 70 - 99 mg/dl    Performed on ACCU-CHEK    POCT Glucose    Collection Time: 03/13/20  9:55 PM   Result Value Ref Range    POC Glucose 135 (H) 70 - 99 mg/dl    Performed on ACCU-CHEK    Blood Gas, Arterial    Collection Time: 03/13/20 11:05 PM   Result Value Ref 7 - 20 mg/dL    CREATININE 3.5 (H) 0.9 - 1.3 mg/dL    GFR Non- 19 (A) >60    GFR  24 (A) >60    Calcium 8.6 8.3 - 10.6 mg/dL    Phosphorus 6.1 (H) 2.5 - 4.9 mg/dL    Alb 2.2 (L) 3.4 - 5.0 g/dL   Magnesium    Collection Time: 03/14/20  2:50 AM   Result Value Ref Range    Magnesium 2.40 1.80 - 2.40 mg/dL   POCT Glucose    Collection Time: 03/14/20  2:55 AM   Result Value Ref Range    POC Glucose 125 (H) 70 - 99 mg/dl    Performed on ACCU-CHEK    POCT Arterial    Collection Time: 03/14/20  3:43 AM   Result Value Ref Range    POC Potassium 6.0 (HH) 3.5 - 5.1 mmol/L    POC Glucose 102 (H) 70 - 99 mg/dl    pH, Arterial 7.314 (L) 7.350 - 7.450    pCO2, Arterial 48.1 (H) 35.0 - 45.0 mm Hg    pO2, Arterial 188.5 (H) 75.0 - 108.0 mm Hg    HCO3, Arterial 24.5 21.0 - 29.0 mmol/L    Base Excess, Arterial -2 -3 - 3    O2 Sat, Arterial 100 93 - 100 %    TCO2, Arterial 26 Not Established mmol/L    Sample Type ART     Performed on SEE BELOW    POCT Glucose    Collection Time: 03/14/20  5:54 AM   Result Value Ref Range    POC Glucose 108 (H) 70 - 99 mg/dl    Performed on ACCU-CHEK    Protime-INR    Collection Time: 03/14/20  5:55 AM   Result Value Ref Range    Protime 16.4 (H) 10.0 - 13.2 sec    INR 1.41 (H) 0.86 - 1.14   APTT    Collection Time: 03/14/20  5:55 AM   Result Value Ref Range    aPTT 26.3 24.2 - 36.2 sec   CBC Auto Differential    Collection Time: 03/14/20  5:55 AM   Result Value Ref Range    WBC 54.1 (HH) 4.0 - 11.0 K/uL    RBC 2.99 (L) 4.20 - 5.90 M/uL    Hemoglobin 9.0 (L) 13.5 - 17.5 g/dL    Hematocrit 26.6 (L) 40.5 - 52.5 %    MCV 89.0 80.0 - 100.0 fL    MCH 30.2 26.0 - 34.0 pg    MCHC 34.0 31.0 - 36.0 g/dL    RDW 15.5 (H) 12.4 - 15.4 %    Platelets 399 077 - 285 K/uL    MPV 9.2 5.0 - 10.5 fL    Path Consult Yes     Neutrophils % 71.0 %    Lymphocytes % 4.0 %    Monocytes % 2.0 %    Eosinophils % 2.0 %    Basophils % 0.0 %    Neutrophils Absolute 49.8 (H) 1.7 - 7.7 K/uL    Lymphocytes Absolute 2.2 1.0 - 5.1 K/uL    Monocytes Absolute 1.1 0.0 - 1.3 K/uL    Eosinophils Absolute 1.1 (H) 0.0 - 0.6 K/uL    Basophils Absolute 0.0 0.0 - 0.2 K/uL    Bands Relative 17 (H) 0 - 7 %    Metamyelocytes Relative 2 (A) %    Myelocyte Percent 2 (A) %    nRBC 5 (A) /100 WBC    nRBC 5 (A) /100 WBC    Toxic Granulation Present (A)     Dohle Bodies Present (A)     Smudge Cells Present (A)     Vacuolated Neutrophils Present (A)     Anisocytosis 1+ (A)     Hypochromia 1+ (A)     Poikilocytes 1+ (A)     Ovalocytes 1+ (A)    Hepatic Function Panel    Collection Time: 03/14/20  5:55 AM   Result Value Ref Range    Total Protein 6.1 (L) 6.4 - 8.2 g/dL    Alb 2.2 (L) 3.4 - 5.0 g/dL    Alkaline Phosphatase 389 (H) 40 - 129 U/L    ALT 5,488 (H) 10 - 40 U/L    AST >7,000 (H) 15 - 37 U/L    Total Bilirubin 6.2 (H) 0.0 - 1.0 mg/dL    Bilirubin, Direct 4.3 (H) 0.0 - 0.3 mg/dL    Bilirubin, Indirect 1.9 (H) 0.0 - 1.0 mg/dL   Procalcitonin    Collection Time: 03/14/20  5:55 AM   Result Value Ref Range    Procalcitonin 9.98 (H) 0.00 - 0.15 ng/mL   Blood Gas, Arterial    Collection Time: 03/14/20  5:55 AM   Result Value Ref Range    pH, Arterial 7.329 (L) 7.350 - 7.450    pCO2, Arterial 46.3 (H) 35.0 - 45.0 mmHg    pO2, Arterial 148.0 (H) 75.0 - 108.0 mmHg    HCO3, Arterial 24.3 21.0 - 29.0 mmol/L    Base Excess, Arterial -1.6 -3.0 - 3.0 mmol/L    Hemoglobin, Art, Extended 7.7 (L) 13.5 - 17.5 g/dL    O2 Sat, Arterial 98.9 >92 %    Carboxyhgb, Arterial 1.5 0.0 - 1.5 %    Methemoglobin, Arterial 8.8 (H) <1.5 %    TCO2, Arterial 25.8 Not Established mmol/L    O2 Content, Arterial 10 Not Established mL/dL    O2 Therapy Unknown    Vancomycin, Random    Collection Time: 03/14/20  5:55 AM   Result Value Ref Range    Vancomycin Rm 15.5 ug/mL   Hepatitis Panel, Acute    Collection Time: 03/14/20  5:55 AM   Result Value Ref Range    Hep A IgM Non-reactive Non-reactive    Hep B Core Ab, IgM Non-reactive Non-reactive    Hep B S Ag Interp Non-reactive Non-reactive    Hep C Ab Interp Non-reactive Non-reactive     Other Labs    Imaging    ASSESSMENT AND RECOMMENDATIONS   [facility] is a 35 y.o. male with a PMH of HTN who presented on 3/5/2020 with acute onset abdominal pain, back pain. CT chest showed aortic dissection and he was taken to OR for emergent repair. We have been consulted for elevated LFTs. Limited DDX for LFT in the 1000s. DDX Ischemic Hepatitis, Tylenol Overdose, or Acute Viral Hepatitis. Review of BPs shows some episodes of hypotension to the  systolic range over the past 24 hours and concern for ischemic hepatitis.      IMPRESSION/RECOMMENDATIONS:  1. Elevated LFTs:  Given acuity, magnitude (>50x UNL) and pattern of elevation (predominately AST, ALT) differentials limited to ischemic hepatitis, acute drug- or toxin-induced liver injury (tyelonol) or acute viral hepatitis. Suspecting ischemic hepatitis at this point. CTA shows hepatic steatosis, normal gallbladder. Viral Hepatitis Panel negative. 2. Aortic Dissection   Recommendations:  -LFT improved. Acute Hepatitis Panel negative. Continue to suspect ischemic Hepatitis. CTA showed patency of the celiac artery. No need to check Ammonia level and would not treat. Continue supportive care. -Continue to tube feeds and titrate to goal rate. If you have any questions or need any further information, please feel free to contact anyone on our consult team.  Thank you for allowing us to participate in the care of [facility].     Negrito Rodriguez MD  [id number] [facility]

## 2020-03-14 NOTE — PLAN OF CARE
Problem: Restraint Use - Nonviolent/Non-Self-Destructive Behavior:  Goal: Absence of restraint indications  Description: Absence of restraint indications  3/14/2020 0456 by Kali Segundo RN  Outcome: Ongoing  Note:  Assess need for continued use. Assess for restraint related injury. Provide assistance with elimination, nutrition, and ROM. Problem: Fluid Volume - Imbalance:  Goal: Ability to achieve a balanced intake and output will improve  Description: Ability to achieve a balanced intake and output will improve  3/14/2020 0456 by Kali Segundo RN  Outcome: Ongoing  Note: CRRT running to keep fluid balance even. No pressor support required. Will notify physician if pt cannot tolerate. Problem: Gas Exchange - Impaired:  Goal: Levels of oxygenation will improve  Description: Levels of oxygenation will improve  Outcome: Ongoing  Note: Serial ABGs drawn. Weaning FiO2 for a PO2 of 100. Problem: Pain:  Goal: Control of acute pain  Description: Control of acute pain  Outcome: Ongoing  Note: CPOT pain scale used. PRN pain medication given as needed. Problem: Infection - Ventilator-Associated Pneumonia:  Goal: Prevent a ventilator associated event (VAE)  Description: Prevent a ventilator associated event (VAE)  Outcome: Ongoing  Note: Q2 oral care performed, peridex mouthwash ordered and given BID, HOB elevated as tolerated. Problem: Infection - Central Venous Catheter-Associated Bloodstream Infection:  Goal: Will show no infection signs and symptoms  Description: Will show no infection signs and symptoms  Outcome: Ongoing  Note: Site remains free of signs/symptoms for infection. No drainage, swelling, redness, pain, or warmth noted. Dressing changes continue per protocol; and on an as needed basis.       Problem: Infection - Surgical Site:  Goal: Will show no infection signs and symptoms  Description: Will show no infection signs and symptoms  Outcome: Ongoing  Note: Site remains free of

## 2020-03-15 NOTE — CONSULTS
anemic with a hemoglobin of 9, hematocrit of  26.6, and his platelets are 522. We are also asked to address the  finding of an LDH in excess of 2500 together with a low reticulocyte  count. His haptoglobin was less than 10. His bilirubin is 10.2 but his  liver function is markedly elevated with an alk phos of 450, ALT of 4601  and AST of greater than 7000. His INR most recently was 1.41. On  03/13/2020, it was 1.66. The patient was thought to have shock liver. There has been no difficulty typing and crossing the patient suggesting  a lack of alloantibodies. PAST MEDICAL HISTORY:  Aortic dissection, G6PD deficiency, hypertension,  obesity, tattoos, loss of vision. PAST SURGICAL HISTORY:  1. Aortic surgery 03/05/2020.  2.  Eye surgery. 3.  Fracture, left ankle. MEDICATIONS:  Please see hospital MAR. SOCIAL HISTORY:  The social history is taken from EPIC. He is a former  and present smoker. He uses alcohol rarely. He does use illicit drugs  including marijuana and cocaine. There is also a suggestion of  amphetamine abuse. He is single. FAMILY HISTORY:  Noncontributory. REVIEW OF SYSTEMS: Unobtainable. PHYSICAL EXAMINATION:  GENERAL:  Finds the patient ill-appearing. He is on a ventilator and is  receiving dialysis. He is unresponsive. NECK:  Supple. Breath sounds are clear bilaterally. CARDIAC EXAM:  Shows a normal S1 and S2. He is slightly tachycardic. EXTREMITIES:  Show 1+ edema. He has a midsternal wound which is  dressed. I did not take down the dressing. ABDOMEN:  Soft and nontender. The patient currently has a right  brachial A-line, right IJ line, and right femoral hemodialysis line. The lines appear intact. IMPRESSION:  The patient has a history of G6PD deficiency. I do not  think that this is coming into play at the present time.   The  possibility that there is an _____ hemolysis due to him being triggered  from stress and infection can be considered though less likely. There  is evidence of intravascular hemolysis however as supported by the low  haptoglobin. The retic count is not elevated and this was rather an  onerous sign. I suspect that much of the hemolysis is simply mechanical  and vascular secondary to the patient's stent. His leukocytosis I would  agree is reactive. Overall, the patient's outlook is poor. The family  indicated that they believe that the problem is that they tried to  extubate the patient too soon. I tried to reassure them that the  efforts being made on the patient's behalf are just that, being done on  his behalf and that this problem is extremely severe and that multiple  services are working to try and make things better. Heme care at this  time is largely supportive. There is no intervention to be done for the  G6PD deficiency. The patient has a shock liver and marked liver  dysfunction. For active bleeding, he can receive vitamin K and FFP and  transfusion as necessary. We will continue to follow.         Sebas Lane MD    D: 03/15/2020 11:53:47       T: 03/15/2020 14:31:31     [email or web address]  Job#: [id number]     Doc#: [id number]    CC:

## 2020-03-15 NOTE — PROGRESS NOTES
hyperkalemia and so CRRT was interrupted on 3/14 and had 2.25 hours of hemodialysis. Could not complete because of hypotension despite not removing any fluid, has been requiring two vasopressors. CRRT resumed after stopping iHD, on 0/2.5, despite which has hyperkalemia. Persistent hyperkalemia is clearly because of intravascular hemolysis. Currently on effluent rate of 35 mg/kg/hr. Prognosis appears to be very poor  Discussed with family. Renal panel Q 4 hours for now. Discussed CRRT orders with RN. Fluid balance positive 100 cc/hr    2. HTN - controlled. Off NTG and cardene gtts  3. Resp failure - on vent   4. Type A aortic dissection - S/p ascending aortic replacement - the patient also has Type B   5. Hyperphosphatemia uncontrolled possibly because of hemolysis. TF on hold. Monitor for now. 6. Hyperkalemia persistent secondary to intravascular hemolysis  7. Hemolytic anemia heme consulted  8. Abnormal LFTs secondary to shock liver. 9. Lactic acidosis secondary to liver failure/shock  10. Acidosis on bicarb gtt as replacement fluid post. ABG with normal pH earlier this AM, may need to change to 150 meq Is worse. ABGs Q 6hr  11. Leukocytosis uptrending, ID on board    Discussed with Dr. Magalis Hawley. DIscussed with Dr. Jenna Cevallos. Discussed with pt's mother.    Total critical care time spent approx 43 mins from 819 to 902 Diana Galindo MD

## 2020-03-15 NOTE — PROGRESS NOTES
INPATIENT CONSULTATION:    IDENTIFYING DATA/REASON FOR CONSULTATION   PATIENT:  Kirill Nguyen  MRN:  [id number]  ADMIT DATE: 3/5/2020  TIME OF EVALUATION: 3/15/2020 10:59 AM  HOSPITAL STAY:   LOS: 10 days   CONSULTING PHYSICIAN:  REASON FOR CONSULTATION:    Subjective:    -Intubated/Sedated. Patient with increasing pressor requirements.      MEDICATIONS   SCHEDULED:  vancomycin, 1,250 mg, Once  sodium chloride, 20 mL, Once  meropenem, 500 mg, Q6H  vancomycin (VANCOCIN) intermittent dosing (placeholder), , RX Placeholder  sodium chloride flush, 10 mL, 2 times per day  erythromycin, , Every Other Day  ipratropium-albuterol, 1 ampule, Q8H  metoprolol tartrate, 25 mg, BID  heparin (porcine), 5,000 Units, BID  aspirin, 325 mg, Daily  sennosides, 5 mL, BID  pantoprazole, 40 mg, Daily    And  sodium chloride (PF), 10 mL, Daily  chlorhexidine, 15 mL, BID      FLUIDS/DRIPS:     sodium bicarbonate infusion 500 mL/hr at 03/15/20 1055    vasopressin (Septic Shock) infusion 0.02 Units/min (03/15/20 1004)    dialysis builder 1,500 mL/hr (03/15/20 0843)    norepinephrine 30 mcg/min (03/15/20 1005)    prismaSOL BGK 0/2.5 2,000 mL/hr (03/15/20 0530)    EPINEPHrine infusion 25 mcg/min (03/15/20 0840)    dextrose Stopped (03/15/20 0101)    DOBUTamine Stopped (03/15/20 0452)    niCARdipine in NaCl Stopped (03/14/20 1618)    dexmedetomidine (PRECEDEX) IV infusion 0.7 mcg/kg/hr (03/15/20 1005)    nitroGLYCERIN Stopped (03/14/20 1640)    fentaNYL 40 mcg/hr (03/15/20 0148)    insulin Stopped (03/14/20 1825)    dextrose      propofol 5 mcg/kg/min (03/15/20 1015)     PRNs: albumin human, 25 g, PRN  potassium chloride, 20 mEq, PRN  magnesium sulfate, 1 g, PRN  calcium gluconate IVPB, 1 g, PRN    Or  calcium gluconate IVPB, 2 g, PRN    Or  calcium gluconate IVPB, 3 g, PRN    Or  calcium gluconate IVPB, 4 g, PRN  sodium phosphate IVPB, 6 mmol, PRN    Or  sodium phosphate IVPB, 12 mmol, PRN    Or  sodium phosphate IVPB, 18 mmol, PRN    Or  sodium phosphate IVPB, 24 mmol, PRN  heparin (porcine), 1,000 Units, PRN  sodium chloride flush, 10 mL, PRN  midazolam, 2 mg, Q2H PRN  fentanNYL, 25 mcg, Q1H PRN  perflutren lipid microspheres, 1.5 mL, ONCE PRN  oxyCODONE, 5 mg, Q4H PRN    Or  oxyCODONE, 10 mg, Q4H PRN  ondansetron, 4 mg, Q8H PRN  metoclopramide, 10 mg, Q6H PRN  hydrALAZINE, 5 mg, Q5 Min PRN  metoprolol, 2.5 mg, Q10 Min PRN  albuterol sulfate HFA, 2 puff, Q6H PRN  glucose, 15 g, PRN  dextrose, 12.5 g, PRN  glucagon (rDNA), 1 mg, PRN  dextrose, 100 mL/hr, PRN      ALLERGIES:  He [unfilled]      PHYSICAL EXAM   [unfilled]   I/O last 3 completed shifts: In: 3097 [I.V.:2812; NG/GT:285]  Out: 2534 [Urine:12]  Oxygen Therapy:  @[id number])@  @[id number])@  @[id number])@    Physical Exam:  Gen: Intubated/Sedated. CV: RRR no MRG    Pul: CTAB   Abd: Good bowel sounds throughout, no scars, soft, NT/ND, no masses, no HSM   Ext: Trace edema   Neuro: Sedated.  Not able to assess   Skin: No jaundice, spider angiomas, rivera erythema     LABS AND IMAGING     Recent Results (from the past 24 hour(s))   POCT Glucose    Collection Time: 03/14/20 12:10 PM   Result Value Ref Range    POC Glucose 132 (H) 70 - 99 mg/dl    Performed on ACCU-CHEK    Renal Function Panel    Collection Time: 03/14/20 12:13 PM   Result Value Ref Range    Sodium 132 (L) 136 - 145 mmol/L    Potassium 7.0 (HH) 3.5 - 5.1 mmol/L    Chloride 97 (L) 99 - 110 mmol/L    CO2 20 (L) 21 - 32 mmol/L    Anion Gap 15 3 - 16    Glucose 127 (H) 70 - 99 mg/dL    BUN 64 (H) 7 - 20 mg/dL    CREATININE 3.1 (H) 0.9 - 1.3 mg/dL    GFR Non-African American 22 (A) >60    GFR  27 (A) >60    Calcium 8.4 8.3 - 10.6 mg/dL    Phosphorus 6.4 (H) 2.5 - 4.9 mg/dL    Alb 2.1 (L) 3.4 - 5.0 g/dL   Magnesium    Collection Time: 03/14/20 12:13 PM   Result Value Ref Range    Magnesium 2.50 (H) 1.80 - 2.40 mg/dL   APTT    Collection Time: 03/14/20 12:13 PM   Result Type ART     Performed on SEE BELOW    Potassium    Collection Time: 03/14/20  4:35 PM   Result Value Ref Range    Potassium 7.7 (HH) 3.5 - 5.1 mmol/L   CK    Collection Time: 03/14/20  4:35 PM   Result Value Ref Range    Total CK 1,597 (H) 39 - 308 U/L   Renal Function Panel    Collection Time: 03/14/20  4:35 PM   Result Value Ref Range    Sodium 132 (L) 136 - 145 mmol/L    Potassium 7.7 (HH) 3.5 - 5.1 mmol/L    Chloride 96 (L) 99 - 110 mmol/L    CO2 15 (L) 21 - 32 mmol/L    Anion Gap 21 (H) 3 - 16    Glucose 105 (H) 70 - 99 mg/dL    BUN 67 (H) 7 - 20 mg/dL    CREATININE 3.6 (H) 0.9 - 1.3 mg/dL    GFR Non- 19 (A) >60    GFR  23 (A) >60    Calcium 8.6 8.3 - 10.6 mg/dL    Phosphorus 7.9 (H) 2.5 - 4.9 mg/dL    Alb 2.2 (L) 3.4 - 5.0 g/dL   Calcium, Ionized    Collection Time: 03/14/20  4:35 PM   Result Value Ref Range    Calcium, Ion 1.09 (L) 1.12 - 1.32 mmol/L    pH, Carlos 7.283 (L) 7.350 - 7.450   Magnesium    Collection Time: 03/14/20  4:35 PM   Result Value Ref Range    Magnesium 2.70 (H) 1.80 - 2.40 mg/dL   POCT Arterial    Collection Time: 03/14/20  6:23 PM   Result Value Ref Range    POC Potassium 6.8 (HH) 3.5 - 5.1 mmol/L    POC Glucose 25 (LL) 70 - 99 mg/dl    Sample Type ART     Performed on SEE BELOW    Blood Gas, Arterial    Collection Time: 03/14/20  6:24 PM   Result Value Ref Range    pH, Arterial 7.261 (L) 7.350 - 7.450    pCO2, Arterial 35.1 35.0 - 45.0 mmHg    pO2, Arterial 65.3 (L) 75.0 - 108.0 mmHg    HCO3, Arterial 15.8 (L) 21.0 - 29.0 mmol/L    Base Excess, Arterial -10.3 (L) -3.0 - 3.0 mmol/L    Hemoglobin, Art, Extended 7.0 (L) 13.5 - 17.5 g/dL    O2 Sat, Arterial 88.3 (L) >92 %    Carboxyhgb, Arterial 1.4 0.0 - 1.5 %    Methemoglobin, Arterial 6.1 (H) <1.5 %    TCO2, Arterial 16.9 Not Established mmol/L    O2 Content, Arterial 8 Not Established mL/dL    O2 Therapy Unknown    Calcium, Ionized    Collection Time: 03/14/20  6:24 PM   Result Value Ref Range    Calcium, Ion 1.10 (L) 1.12 - 1.32 mmol/L    pH, Carlos 7.285 (L) 7.350 - 7.450   Haptoglobin    Collection Time: 03/14/20  6:24 PM   Result Value Ref Range    Haptoglobin <10.0 (L) 30.0 - 200.0 mg/dL   Lactate Dehydrogenase    Collection Time: 03/14/20  6:24 PM   Result Value Ref Range    LD >2,500 (H) 100 - 190 U/L   Reticulocytes    Collection Time: 03/14/20  6:24 PM   Result Value Ref Range    Retic Ct Pct 0.43 (L) 0.50 - 2.18 %    Retic Ct Abs 0.011 M/uL    Immature Retic Fract 0.24 0.21 - 0.37    Hematocrit 23.0 (L) 40.5 - 52.5 %   CBC Auto Differential    Collection Time: 03/14/20  6:24 PM   Result Value Ref Range    WBC 75.2 (HH) 4.0 - 11.0 K/uL    RBC 2.59 (L) 4.20 - 5.90 M/uL    Hemoglobin 7.7 (L) 13.5 - 17.5 g/dL    MCV 88.6 80.0 - 100.0 fL    MCH 29.8 26.0 - 34.0 pg    MCHC 33.6 31.0 - 36.0 g/dL    RDW 15.6 (H) 12.4 - 15.4 %    Platelets 309 708 - 611 K/uL    MPV 9.3 5.0 - 10.5 fL    PLATELET SLIDE REVIEW Adequate     SLIDE REVIEW see below     Path Consult No     Neutrophils % 72.0 %    Lymphocytes % 4.0 %    Monocytes % 3.0 %    Eosinophils % 0.0 %    Basophils % 0.0 %    Neutrophils Absolute 69.9 (H) 1.7 - 7.7 K/uL    Lymphocytes Absolute 3.0 1.0 - 5.1 K/uL    Monocytes Absolute 2.3 (H) 0.0 - 1.3 K/uL    Eosinophils Absolute 0.0 0.0 - 0.6 K/uL    Basophils Absolute 0.0 0.0 - 0.2 K/uL    Bands Relative 5 0 - 7 %    Metamyelocytes Relative 5 (A) %    Myelocyte Percent 9 (A) %    Promyelocytes Percent 2 (A) %    nRBC 8 (A) /100 WBC    nRBC 8 (A) /100 WBC    Toxic Granulation Present (A)     Dohle Bodies Present (A)     Smudge Cells Present (A)     Vacuolated Neutrophils Present (A)     Anisocytosis 1+ (A)     Polychromasia Occasional (A)     Hypochromia 1+ (A)     Poikilocytes 1+ (A)     Ovalocytes 1+ (A)    Renal Function Panel    Collection Time: 03/14/20  6:24 PM   Result Value Ref Range    Sodium 144 136 - 145 mmol/L    Potassium 7.1 (HH) 3.5 - 5.1 mmol/L    Chloride 96 (L) 99 - 110 mmol/L    CO2 25 21 - 32 mmol/L    Anion Gap 23 (H) 3 - 16    Glucose 33 (LL) 70 - 99 mg/dL    BUN 61 (H) 7 - 20 mg/dL    CREATININE 3.4 (H) 0.9 - 1.3 mg/dL    GFR Non-African American 20 (A) >60    GFR  24 (A) >60    Calcium 8.3 8.3 - 10.6 mg/dL    Phosphorus 7.2 (H) 2.5 - 4.9 mg/dL    Alb 2.0 (L) 3.4 - 5.0 g/dL   Magnesium    Collection Time: 03/14/20  6:24 PM   Result Value Ref Range    Magnesium 2.50 (H) 1.80 - 2.40 mg/dL   POCT Glucose    Collection Time: 03/14/20  6:31 PM   Result Value Ref Range    POC Glucose 39 (LL) 70 - 99 mg/dl    Performed on ACCU-CHEK    POCT Glucose    Collection Time: 03/14/20  6:43 PM   Result Value Ref Range    POC Glucose 101 (H) 70 - 99 mg/dl    Performed on ACCU-CHEK    POCT Arterial    Collection Time: 03/14/20  7:46 PM   Result Value Ref Range    POC Glucose 45 (LL) 70 - 99 mg/dl    pH, Arterial 7.389 7.350 - 7.450    pCO2, Arterial 34.6 (L) 35.0 - 45.0 mm Hg    pO2, Arterial 81.9 75.0 - 108.0 mm Hg    HCO3, Arterial 20.9 (L) 21.0 - 29.0 mmol/L    Base Excess, Arterial -4 (L) -3 - 3    O2 Sat, Arterial 96 93 - 100 %    TCO2, Arterial 22 Not Established mmol/L    Sample Type ART     Performed on SEE BELOW    POCT Glucose    Collection Time: 03/14/20  8:24 PM   Result Value Ref Range    POC Glucose 78 70 - 99 mg/dl    Performed on ACCU-CHEK    TYPE AND SCREEN    Collection Time: 03/14/20  8:25 PM   Result Value Ref Range    ABO/Rh CANCELED     Antibody Screen NEG    PREPARE RBC (CROSSMATCH), 1 Units    Collection Time: 03/14/20  8:25 PM   Result Value Ref Range    Product Code Blood Bank [id number]     Description Blood Bank Red Blood Cells, Apheresis, Leuko-reduced     Unit Number [id number]     Dispense Status Blood Bank transfused    ABO/RH    Collection Time: 03/14/20  8:25 PM   Result Value Ref Range    ABO/Rh O POS    POCT Arterial    Collection Time: 03/14/20  9:14 PM   Result Value Ref Range    POC Potassium 5.8 (H) 3.5 - 5.1 mmol/L    POC Glucose 68 (L) 70 - 99 mg/dl    Calcium, Ion 1.01 (L) 1.12 - 1.32 mmol/L    pH, Arterial 7.399 7.350 - 7.450    pCO2, Arterial 38.4 35.0 - 45.0 mm Hg    pO2, Arterial 72.1 (L) 75.0 - 108.0 mm Hg    HCO3, Arterial 23.7 21.0 - 29.0 mmol/L    Base Excess, Arterial -1 -3 - 3    O2 Sat, Arterial 94 93 - 100 %    TCO2, Arterial 25 Not Established mmol/L    Sample Type ART     Performed on SEE BELOW    POCT Arterial    Collection Time: 03/14/20 10:22 PM   Result Value Ref Range    POC Sodium 134 (L) 136 - 145 mmol/L    POC Potassium 5.7 (H) 3.5 - 5.1 mmol/L    POC Glucose 74 70 - 99 mg/dl    Calcium, Ion 0.98 (L) 1.12 - 1.32 mmol/L    pH, Arterial 7.386 7.350 - 7.450    pCO2, Arterial 35.0 35.0 - 45.0 mm Hg    pO2, Arterial 94.7 75.0 - 108.0 mm Hg    HCO3, Arterial 21.0 21.0 - 29.0 mmol/L    Base Excess, Arterial -4 (L) -3 - 3    O2 Sat, Arterial 97 93 - 100 %    TCO2, Arterial 22 Not Established mmol/L    Lactate 12.88 (HH) 0.40 - 2.00 mmol/L    POC Hematocrit 22.0 (L) 40.5 - 52.5 %    Hemoglobin 7.4 (L) 13.5 - 17.5 gm/dL    FIO2 80.00     Sample Type ART     Performed on SEE BELOW    POCT Arterial    Collection Time: 03/14/20 11:16 PM   Result Value Ref Range    POC Potassium 5.8 (H) 3.5 - 5.1 mmol/L    POC Glucose 80 70 - 99 mg/dl    pH, Arterial 7.361 7.350 - 7.450    pCO2, Arterial 35.6 35.0 - 45.0 mm Hg    pO2, Arterial 103.7 75.0 - 108.0 mm Hg    HCO3, Arterial 20.1 (L) 21.0 - 29.0 mmol/L    Base Excess, Arterial -5 (L) -3 - 3    O2 Sat, Arterial 98 93 - 100 %    TCO2, Arterial 21 Not Established mmol/L    Sample Type ART     Performed on SEE BELOW    POCT Arterial    Collection Time: 03/15/20 12:09 AM   Result Value Ref Range    POC Potassium 5.6 (H) 3.5 - 5.1 mmol/L    POC Glucose 114 (H) 70 - 99 mg/dl    Calcium, Ion 0.99 (L) 1.12 - 1.32 mmol/L    pH, Arterial 7.363 7.350 - 7.450    pCO2, Arterial 36.9 35.0 - 45.0 mm Hg    pO2, Arterial 82.3 75.0 - 108.0 mm Hg    HCO3, Arterial 21.0 21.0 - 29.0 mmol/L    Base Excess, Arterial -4 (L) -3 - 3    O2 Sat, Arterial 96 93 - 100 %    TCO2, Arterial 22 Not Established mmol/L    Sample Type ART     Performed on SEE BELOW    POCT Glucose    Collection Time: 03/15/20  1:01 AM   Result Value Ref Range    POC Glucose 127 (H) 70 - 99 mg/dl    Performed on ACCU-CHEK    Blood Gas, Arterial    Collection Time: 03/15/20  1:40 AM   Result Value Ref Range    pH, Arterial 7.314 (L) 7.350 - 7.450    pCO2, Arterial 34.5 (L) 35.0 - 45.0 mmHg    pO2, Arterial 128.0 (H) 75.0 - 108.0 mmHg    HCO3, Arterial 17.5 (L) 21.0 - 29.0 mmol/L    Base Excess, Arterial -8.0 (L) -3.0 - 3.0 mmol/L    Hemoglobin, Art, Extended 6.7 (L) 13.5 - 17.5 g/dL    O2 Sat, Arterial 99.4 >92 %    Carboxyhgb, Arterial 1.3 0.0 - 1.5 %    Methemoglobin, Arterial 5.9 (H) <1.5 %    TCO2, Arterial 18.6 Not Established mmol/L    O2 Content, Arterial 9 Not Established mL/dL    O2 Therapy Unknown    Renal Function Panel    Collection Time: 03/15/20  1:40 AM   Result Value Ref Range    Sodium 133 (L) 136 - 145 mmol/L    Potassium 5.8 (H) 3.5 - 5.1 mmol/L    Chloride 91 (L) 99 - 110 mmol/L    CO2 17 (L) 21 - 32 mmol/L    Anion Gap 25 (H) 3 - 16    Glucose 121 (H) 70 - 99 mg/dL    BUN 40 (H) 7 - 20 mg/dL    CREATININE 2.1 (H) 0.9 - 1.3 mg/dL    GFR Non-African American 35 (A) >60    GFR  42 (A) >60    Calcium 8.3 8.3 - 10.6 mg/dL    Phosphorus 7.2 (H) 2.5 - 4.9 mg/dL    Alb 2.6 (L) 3.4 - 5.0 g/dL   Lactic Acid, Plasma    Collection Time: 03/15/20  1:40 AM   Result Value Ref Range    Lactic Acid 11.8 (HH) 0.4 - 2.0 mmol/L   Magnesium    Collection Time: 03/15/20  1:40 AM   Result Value Ref Range    Magnesium 2.20 1.80 - 2.40 mg/dL   Calcium, Ionized    Collection Time: 03/15/20  1:40 AM   Result Value Ref Range    Calcium, Ion 1.04 (L) 1.12 - 1.32 mmol/L    pH, Carlos 7.317 (L) 7.350 - 7.450   Hemoglobin and Hematocrit, Blood    Collection Time: 03/15/20  1:40 AM   Result Value Ref Range    Hemoglobin 7.9 (L) 13.5 - 17.5 g/dL    Hematocrit 24.0 (L) 40.5 - 52.5 % CBC Auto Differential    Collection Time: 03/15/20  5:24 AM   Result Value Ref Range    WBC 85.5 (HH) 4.0 - 11.0 K/uL    RBC 2.44 (L) 4.20 - 5.90 M/uL    Hemoglobin 7.4 (L) 13.5 - 17.5 g/dL    Hematocrit 22.6 (L) 40.5 - 52.5 %    MCV 92.7 80.0 - 100.0 fL    MCH 30.3 26.0 - 34.0 pg    MCHC 32.7 31.0 - 36.0 g/dL    RDW 15.5 (H) 12.4 - 15.4 %    Platelets 085 701 - 876 K/uL    MPV 9.4 5.0 - 10.5 fL    Path Consult No     Neutrophils % 62.0 %    Lymphocytes % 3.0 %    Monocytes % 7.0 %    Eosinophils % 0.0 %    Basophils % 0.0 %    Neutrophils Absolute 77.0 (H) 1.7 - 7.7 K/uL    Lymphocytes Absolute 2.6 1.0 - 5.1 K/uL    Monocytes Absolute 6.0 (H) 0.0 - 1.3 K/uL    Eosinophils Absolute 0.0 0.0 - 0.6 K/uL    Basophils Absolute 0.0 0.0 - 0.2 K/uL    Bands Relative 10 (H) 0 - 7 %    Metamyelocytes Relative 4 (A) %    Myelocyte Percent 10 (A) %    Promyelocytes Percent 4 (A) %    nRBC 6 (A) /100 WBC    nRBC 6 (A) /100 WBC    Anisocytosis 1+ (A)     Microcytes 1+ (A)     Polychromasia Occasional (A)     Spherocytes 1+ (A)    Renal Function Panel    Collection Time: 03/15/20  5:24 AM   Result Value Ref Range    Sodium 134 (L) 136 - 145 mmol/L    Potassium 6.4 (HH) 3.5 - 5.1 mmol/L    Chloride 89 (L) 99 - 110 mmol/L    CO2 16 (L) 21 - 32 mmol/L    Anion Gap 29 (H) 3 - 16    Glucose 107 (H) 70 - 99 mg/dL    BUN 42 (H) 7 - 20 mg/dL    CREATININE 2.1 (H) 0.9 - 1.3 mg/dL    GFR Non-African American 35 (A) >60    GFR  42 (A) >60    Calcium 8.4 8.3 - 10.6 mg/dL    Phosphorus 8.4 (H) 2.5 - 4.9 mg/dL    Alb 2.6 (L) 3.4 - 5.0 g/dL   Protime-INR    Collection Time: 03/15/20  5:25 AM   Result Value Ref Range    Protime 23.0 (H) 10.0 - 13.2 sec    INR 1.97 (H) 0.86 - 1.14   APTT    Collection Time: 03/15/20  5:25 AM   Result Value Ref Range    aPTT 27.8 24.2 - 36.2 sec   Hepatic Function Panel    Collection Time: 03/15/20  5:25 AM   Result Value Ref Range    Total Protein 5.9 (L) 6.4 - 8.2 g/dL    Alb 2.6 (L) 3.4 - 5.0 - 5.1 mmol/L    Chloride 88 (L) 99 - 110 mmol/L    CO2 15 (L) 21 - 32 mmol/L    Anion Gap 31 (H) 3 - 16    Glucose 112 (H) 70 - 99 mg/dL    BUN 42 (H) 7 - 20 mg/dL    CREATININE 1.9 (H) 0.9 - 1.3 mg/dL    GFR Non-African American 39 (A) >60    GFR  48 (A) >60    Calcium 8.2 (L) 8.3 - 10.6 mg/dL    Phosphorus 7.9 (H) 2.5 - 4.9 mg/dL    Alb 2.6 (L) 3.4 - 5.0 g/dL   Magnesium    Collection Time: 03/15/20  9:20 AM   Result Value Ref Range    Magnesium 2.30 1.80 - 2.40 mg/dL     Other Labs    Imaging    ASSESSMENT AND RECOMMENDATIONS   Haim Wheat is a 35 y.o. male with a PMH of HTN who presented on 3/5/2020 with acute onset abdominal pain, back pain. CT chest showed aortic dissection and he was taken to OR for emergent repair. We have been consulted for elevated LFTs. Limited DDX for LFT in the 1000s. DDX Ischemic Hepatitis, Tylenol Overdose, or Acute Viral Hepatitis. Concern for ischemic hepatitis.      IMPRESSION/RECOMMENDATIONS:  1. Elevated LFTs:  Given acuity, magnitude (>50x UNL) and pattern of elevation (predominately AST, ALT) differentials limited to ischemic hepatitis, acute drug- or toxin-induced liver injury (tyelonol) or acute viral hepatitis. Suspecting ischemic hepatitis at this point. CTA shows hepatic steatosis, normal gallbladder. Viral Hepatitis Panel negative. 2. Aortic Dissection   3. Recommendations:  -LFT worsening again with worsening hypotension. Acute Hepatitis Panel negative. CTA showed patency of the celiac artery. Comfort care recommended by primary team.   -Continue to tube feeds and titrate to goal rate. If you have any questions or need any further information, please feel free to contact anyone on our consult team.  Thank you for allowing us to participate in the care of Haim Wheat.     Valentin Bautista MD  [facility]

## 2020-03-15 NOTE — PROGRESS NOTES
2.50* 2.20  --  2.30     Cardiac Enzymes:   Recent Labs     03/14/20  1635   CKTOTAL 1,597*     PT/INR:   Recent Labs     03/14/20  0555 03/14/20  1213 03/15/20  0525   PROTIME 16.4* 15.7* 23.0*   INR 1.41* 1.35* 1.97*     APTT:   Recent Labs     03/14/20  0555 03/14/20  1213 03/15/20  0525   APTT 26.3 26.4 27.8       Assessment/Plan:  Stable from cardiac standpoint  Patient suffering from sequela of malperfusion's will be addressed by vascular surgery  Hold tube feed for now    Lefty Saldana MD  3/15/2020  10:34 AM

## 2020-03-15 NOTE — PROGRESS NOTES
Clinical Pharmacy Note  Vancomycin Consult    Ange Levy is a 35 y.o. male ordered Vancomycin for empiric coverage; consult received from SANJEEV Harper to manage therapy. Also receiving meropenem. Patient Active Problem List   Diagnosis    Ascending aortic aneurysm ([address]    Dissection of thoracic aorta ([address]    Dissecting aneurysm of thoracic aorta, [place] type A ([address]    Acute respiratory failure with hypoxia (HCC)    Centrilobular emphysema ([address]    JEYSON (acute kidney injury) ([facility]       Allergies:  Ibuprofen     Temp max:  Temp (24hrs), Av.2 °F (36.8 °C), Min:97.2 °F (36.2 °C), Max:99.5 °F (37.5 °C)      Recent Labs     20  0555 20  1824 03/15/20  0524   WBC 54.1* 75.2* 85.5*       Recent Labs     20  1824 03/15/20  0140 03/15/20  0524   BUN 61* 40* 42*   CREATININE 3.4* 2.1* 2.1*         Intake/Output Summary (Last 24 hours) at 3/15/2020 0928  Last data filed at 3/15/2020 0856  Gross per 24 hour   Intake 2909 ml   Output 2373 ml   Net 536 ml       Culture Results:  3/11/20 BAL: >100K normal respiratory harriett    Ht Readings from Last 1 Encounters:   20 5' 11\" (1.803 m)        Wt Readings from Last 1 Encounters:   03/15/20 279 lb 15.8 oz (127 kg)       Assessment/Plan:  Day # 5 of Vancomycin. Patient received 2.5 hours of HD last evening; patient is back on CRRT at this time. Random vancomycin level 14.1 ug/mL this morning. Vancomycin 1,250 mg IVPB x 1 dose now. Random Vancomycin level ordered for 20 with AM labs. Thank you for the consult. Will continue to follow.     Dain Brown, PharmD, BCPS  3/15/2020 9:28 AM

## 2020-03-15 NOTE — PROGRESS NOTES
alloantibodies. Intravascular hemolysis is not an uncommon finding in the clinical setting of severe vascular interruption and it is likely mechanical from the dissection. Heme care is supportive. The patient has shock liver and marked liver dysfunction. For active bleeding he can receive vitamin K and FFP. We can transfuse as necessary. I do not believe that there is an immune mediated hemolytic process and there is no indication for steroids. Sadly, the patient's outlook is rather grim in this clinical setting. Duncan Mariscal. Breanna Mallory M.D., MPH  Co-Chair, Department of  Clinical [facility], [address] [place] [address]  Office [phone]  [place] [phone]. [email or web address]    \"Participating in a clinical trial is the first step to fighting cancer; not the last.\"

## 2020-03-15 NOTE — PROGRESS NOTES
0700 - Shift report/handoff completed with So Green RN.     0800 - Initial shift assessment completed. Vital signs within goal parameters on current drips. Pt able to wake to verbal stimuli to follow commands and withdraws from pain even on sedation drips. Pt doesn't sustain this attention. CRRT intact to R fem vas cath and running without complication. Effluent drainage noted to be a dark yogi color. F/C in place and draining very minimal tea colored urine at this time. Dsg to L eye was changed per order. ETT intact to ventilator and pt does breathe above vent set rate at times. FiO2 remains at 75%. BLE doppler pulses. BUE palpable pulses. Mother and fiance at bedside. Will monitor. 0830 - Dr. Oneita Gowers (hemoc) rounding. Spoke with family. No new orders. Ina Benson (GI) rounding. No new orders. 0900 - Dr. Parish Torres (nephro) rounding. No new orders. 2905 - Dr. Neena Holbrook (vascular) rounding. Spoke with family about poor prognosis and the idea of withdrawing care. Family not interested at this time. Wants \"everything\" done to save the pt's life up and until his death. New orders placed and acknowledged from Dr. Neena Holbrook. Will monitor. 1000 - Dr. Clifford Esparza (pulmonology/intensivist) rounding. Spoke with family about poor prognosis. No new orders noted. 56 - Dr. Bernard Torrez (CTS) rounding. Spoke with family. No new orders. 1200 - Assessment unchanged. Labs showing improvement in K+ levels. Will monitor. 1600 - Assessment remains unchanged. Family allowed to come back for brief visits while wearing masks. Was told this would be the only visit until the condition worsened or death was imminent. States understanding. 1900 - Shift report/handoff completed with So Green RN    . Leonardo Morgan Electronically signed by Matthew Nguyễn RN on 3/15/2020 at 7:12 PM

## 2020-03-15 NOTE — PROGRESS NOTES
APTT 26.3 26.4 27.8     UA:No results for input(s): NITRITE, COLORU, PHUR, LABCAST, WBCUA, RBCUA, MUCUS, TRICHOMONAS, YEAST, BACTERIA, CLARITYU, SPECGRAV, LEUKOCYTESUR, UROBILINOGEN, BILIRUBINUR, BLOODU, GLUCOSEU, AMORPHOUS in the last 72 hours. Invalid input(s): Faviola Tapia  No results for input(s): PH, PCO2, PO2 in the last 72 hours. Cx:      Films:             Assessment:         Lat Canthotomy. Plan:      Acute hypoxemia      Duoneb's      bronchoscopy  culture negative. Kelly Jacinto empiric antibiotics. Vanc merrem.  Wean o2 to pao2.       JEYSON   CRRT   Increased K.     Hem consulted. BP    Per CT surg / vascular           Dissection   Type B.     CTA completed.  Leading to shock, end organ damage. LFT increasing with decreasing function.  Very poor prognosis. Family advised. Mental status  -   head CT completed. Prophylaxis   GI - pepcid     DVT - heparin     VAP - peridex   Nasal Decolonization - Bactroban    Nutrition  No diet orders on file    Intake/Output Summary (Last 24 hours) at 3/15/2020 0949  Last data filed at 3/15/2020 0900  Gross per 24 hour   Intake 3232.49 ml   Output 2428 ml   Net 804.49 ml          Access  Arterial   Arterial Line 03/05/20 Right Brachial (Active)   Continued need for line? Yes 3/14/2020 10:00 AM   Site Assessment Clean;Dry; Intact 3/14/2020 10:00 AM   Line Status Arterial fluids per protocol; Intact and in place; Blood return noted; No blood noted in line 3/14/2020 10:00 AM   Art Line Waveform Appropriate 3/14/2020 10:00 AM   Art Line Interventions Connections checked and tightened 3/14/2020  2:00 AM   Color/Movement/Sensation Capillary refill less than 3 sec 3/14/2020 10:00 AM   Dressing Status Clean;Dry; Intact 3/14/2020 10:00 AM   Dressing Type Transparent; Anti-microbial patch 3/14/2020 10:00 AM   Dressing Intervention New 3/5/2020  9:10 AM   Dressing Change Due 03/19/20 3/14/2020 10:00 AM   Number of days: 9       PICC

## 2020-03-16 NOTE — PROGRESS NOTES
Intravenous, Continuous  erythromycin (ROMYCIN) ophthalmic ointment, , Left Eye, Every Other Day  ipratropium-albuterol (DUONEB) nebulizer solution 1 ampule, 1 ampule, Inhalation, Q8H  metoprolol tartrate (LOPRESSOR) tablet 25 mg, 25 mg, Oral, BID  heparin (porcine) injection 5,000 Units, 5,000 Units, Subcutaneous, BID  aspirin tablet 325 mg, 325 mg, Oral, Daily  sennosides (SENOKOT) 8.8 MG/5ML syrup 5 mL, 5 mL, Per NG tube, BID  fentaNYL 10 mcg/mL infusion, 50 mcg/hr, Intravenous, Continuous  fentaNYL (SUBLIMAZE) injection 25 mcg, 25 mcg, Intravenous, Q1H PRN  perflutren lipid microspheres (DEFINITY) injection 1.65 mg, 1.5 mL, Intravenous, ONCE PRN  insulin regular (HUMULIN R;NOVOLIN R) 100 Units in sodium chloride 0.9 % 100 mL infusion, 1 Units/hr, Intravenous, Continuous  oxyCODONE (ROXICODONE) immediate release tablet 5 mg, 5 mg, Oral, Q4H PRN **OR** oxyCODONE HCl (OXY-IR) immediate release tablet 10 mg, 10 mg, Oral, Q4H PRN  ondansetron (ZOFRAN) injection 4 mg, 4 mg, Intravenous, Q8H PRN  metoclopramide (REGLAN) injection 10 mg, 10 mg, Intravenous, Q6H PRN  pantoprazole (PROTONIX) injection 40 mg, 40 mg, Intravenous, Daily **AND** sodium chloride (PF) 0.9 % injection 10 mL, 10 mL, Intravenous, Daily  chlorhexidine (PERIDEX) 0.12 % solution 15 mL, 15 mL, Mouth/Throat, BID  hydrALAZINE (APRESOLINE) injection 5 mg, 5 mg, Intravenous, Q5 Min PRN  metoprolol (LOPRESSOR) injection 2.5 mg, 2.5 mg, Intravenous, Q10 Min PRN  albuterol sulfate  (90 Base) MCG/ACT inhaler 2 puff, 2 puff, Inhalation, Q6H PRN  glucose (GLUTOSE) 40 % oral gel 15 g, 15 g, Oral, PRN  dextrose 50 % IV solution, 12.5 g, Intravenous, PRN  glucagon (rDNA) injection 1 mg, 1 mg, Intramuscular, PRN  dextrose 5 % solution, 100 mL/hr, Intravenous, PRN  propofol injection, 10 mcg/kg/min, Intravenous, Titrated    Data reviewed:  Labs:  CBC:   Recent Labs     03/14/20  1824  03/15/20  0140 03/15/20  0524 03/15/20  0919 03/16/20  0450 03/16/20  3141 WBC 75.2*  --   --  85.5*  --  83.8*  --    HGB 7.7*   < > 7.9* 7.4* 7.6* 5.7* 6.1*   HCT 23.0*  --  24.0* 22.6*  --  16.3*  --    MCV 88.6  --   --  92.7  --  91.7  --      --   --  292  --  249  --     < > = values in this interval not displayed. BMP:   Recent Labs     03/15/20  2015 03/16/20  0010 03/16/20  0450   * 127* 126*   K 5.3* 4.9 3.9   CL 81* 82* 80*   CO2 17* 21 22   PHOS 8.4* 6.6* 6.0*   BUN 38* 35* 37*   CREATININE 1.6* 1.7* 1.4*     LIVER PROFILE:   Recent Labs     03/14/20  0555 03/15/20  0525 03/16/20  0450   AST >7,000* >7,000* >7,000*   ALT 5,488* 4,601* 3,286*   BILIDIR 4.3* 7.0* 8.8*   BILITOT 6.2* 10.2* 12.5*   ALKPHOS 389* 485* 481*     PT/INR:   Recent Labs     03/14/20  1213 03/15/20  0525 03/16/20  0450   PROTIME 15.7* 23.0* 27.3*   INR 1.35* 1.97* 2.33*     APTT:   Recent Labs     03/14/20  1213 03/15/20  0525 03/16/20  0450   APTT 26.4 27.8 29.7       Cultures:   Blood culture (3/12): NGTD  BAL (3/11): Normal harriett  Nasal MRSA probe: Negative    Films:  Chest images and reports were reviewed by me. My interpretation is:  CXR (3/16/20): Stable vascular congestion and retrocardiac opacity; ETT, CVC and gastric tube in place      Assessment:     Aortic dissection  Acute kidney injury  Ischemic hepatitis  Shock  Biventricular failure  Acute hypoxic respiratory failure  Lactic acidosis  Hyperglycemia  Leukocytosis  Hemolytic anemia  Coagulopathy      Plan: Aortic dissection  - s/p repair of retrograde ascending aortic dissection on 3/5.  Plan for repair of type B dissection today per vascular surgery    Acute kidney injury  - On CRRT per nephrology    Ischemic hepatitis  - Vasopressors to keep MAP>65  - Check daily LFTs    Shock  - Levophed, epinephrine and vasopressin to keep MAP>65  - Meropenem and vancomycin empirically   - Check ScVO2    Biventricular failure  - Continue vasopressors  - Check ScVO2    Acute hypoxic respiratory failure  - In the setting of vascular congestion and possible pneumonia  - Fluid removal with CRRT  - Meropenem and vancomycin empirically   - Continue mechanical ventilation. Wean FiO2 and PEEP as able to keep MAP>65    Lactic acidosis  - Cycle every 8 hours    Hyperglycemia  - Increase sliding scale insulin    Leukocytosis  - On meropenem and vancomycin empirically (day #6)  - Send pan-culture    Hemolytic anemia  - Receiving 1 unit PRBCs today  - Check H/H after transfusion    Coagulopathy  - Due to liver failure and hemolysis  - Check daily labs      Prophylaxis  DVT- SQ heparin  GI- protonix  VAP- peridex    I spent 45 minutes of critical care time with this patient excluding procedures.     Arabella Bautista MD  [facility] Pulmonary, Critical Care and Sleep

## 2020-03-16 NOTE — PLAN OF CARE
Nutrition Problem: Inadequate oral intake  Intervention: Food and/or Nutrient Delivery: Continue NPO(restart nutrition when appropriate)  Nutritional Goals:  Tolerate most appropriate form of nutrition this admission

## 2020-03-16 NOTE — ANESTHESIA PRE PROCEDURE
Department of Anesthesiology  Preprocedure Note       Name:  Talya Salcido   Age:  35 y.o.  :  1980                                          MRN:  [id number]         Date:  3/16/2020      Surgeon: Nga Byrnes):  Deborah Roman MD    Procedure: ABDOMINAL AORTIC ANEURYSM REPAIR/ ENDOVASCULAR STENT GRAFT (N/A Abdomen)    Medications prior to admission:   Prior to Admission medications    Not on File       Current medications:    No current facility-administered medications for this visit. No current outpatient medications on file.      Facility-Administered Medications Ordered in Other Visits   Medication Dose Route Frequency Provider Last Rate Last Dose    prismaSATE BGK 4/2.5 dialysis solution   Dialysis Continuous Chantal Glasgow MD 1,500 mL/hr at 20 1043 1,500 mL/hr at 20 2415 [facility] 4/2.5 dialysis solution   Dialysis Continuous Ernesto Hernandez RN 2,000 mL/hr at 20 0704 2,000 mL/hr at 20 0704    0.9 % sodium chloride infusion 250 mL  250 mL Intravenous Once Deborah Roman MD        atropine 1 MG/10ML injection             insulin lispro (HUMALOG) injection vial 0-18 Units  0-18 Units Subcutaneous Q4H SEBASTIAN Doyle - CNP   6 Units at 20 1301    Celeste Severs 4/2.5 dialysis solution   Dialysis Continuous Josias Tavarez  mL/hr at 20 1043 500 mL/hr at 20 1043    vasopressin 20 Units in sodium chloride 0.9 % 100 mL infusion  0.02 Units/min Intravenous Continuous Deborah Roman MD 6 mL/hr at 20 0023 0.02 Units/min at 20 0023    EPINEPHrine (EPINEPHrine HCL) 10 mg in dextrose 5 % 250 mL infusion  1 mcg/min Intravenous Continuous Lula Pritchett MD 15 mL/hr at 20 1059 10 mcg/min at 20 1059    norepinephrine (LEVOPHED) 16 mg in dextrose 5% 250 mL infusion  5 mcg/min Intravenous Continuous Deborah Roman MD 28.1 mL/hr at 20 1302 30 mcg/min at 20 1302    0.9 % sodium chloride bolus  20 vancomycin (VANCOCIN) intermittent dosing (placeholder)   Other RX Placeholder Arlester Seed, APRN - CNP        sodium chloride flush 0.9 % injection 10 mL  10 mL Intravenous 2 times per day Arlester Seed, APRN - CNP   10 mL at 03/16/20 0834    sodium chloride flush 0.9 % injection 10 mL  10 mL Intravenous PRN Arlester Seed, APRN - CNP        midazolam (VERSED) injection 2 mg  2 mg Intravenous Q2H PRN Randye Hopping, APRN - CNP   2 mg at 03/15/20 1200    nitroGLYCERIN 50 mg in dextrose 5% 250 mL infusion  5 mcg/min Intravenous Continuous Randye Hopping, APRN - CNP   Stopped at 03/14/20 1640    erythromycin [facility]) ophthalmic ointment   Left Eye Every Other Day Randye Hopping, APRN - CNP        ipratropium-albuterol (DUONEB) nebulizer solution 1 ampule  1 ampule Inhalation Q8H Alina Escalona MD   1 ampule at 03/16/20 0809    metoprolol tartrate (LOPRESSOR) tablet 25 mg  25 mg Oral BID Mustapha Read MD   25 mg at 03/14/20 0837    heparin (porcine) injection 5,000 Units  5,000 Units Subcutaneous BID Randye Hopping, APRN - CNP   5,000 Units at 03/16/20 3452    aspirin tablet 325 mg  325 mg Oral Daily Randye Hopping, APRN - CNP   325 mg at 03/16/20 0820    sennosides (SENOKOT) 8.8 MG/5ML syrup 5 mL  5 mL Per NG tube BID Randye Hopping, APRN - CNP   5 mL at 03/16/20 0819    fentaNYL 10 mcg/mL infusion  50 mcg/hr Intravenous Continuous Mearl Patoka, DO 4 mL/hr at 03/16/20 1139 40 mcg/hr at 03/16/20 1139    fentaNYL (SUBLIMAZE) injection 25 mcg  25 mcg Intravenous Q1H PRN Arlester Seed, APRN - CNP   25 mcg at 03/13/20 0246    perflutren lipid microspheres (DEFINITY) injection 1.65 mg  1.5 mL Intravenous ONCE PRN Arlester Seed, APRN - CNP        insulin regular (HUMULIN R;NOVOLIN R) 100 Units in sodium chloride 0.9 % 100 mL infusion  1 Units/hr Intravenous Continuous Randye Hopping, APRN - CNP   Stopped at 03/14/20 1860    oxyCODONE (ROXICODONE) immediate release tablet 5 mg  5 mg Oral Q4H PRN Claudine Cheadle, MD        Or    oxyCODONE HCl (OXY-IR) immediate release tablet 10 mg  10 mg Oral Q4H PRN Claudine Cheadle, MD        ondansetron [facility]) injection 4 mg  4 mg Intravenous Q8H PRN Claudine Cheadle, MD        metoclopramide (REGLAN) injection 10 mg  10 mg Intravenous Q6H PRN Claudine Cheadle, MD   10 mg at 03/09/20 1034    pantoprazole (PROTONIX) injection 40 mg  40 mg Intravenous Daily Claudine Cheadle, MD   40 mg at 03/16/20 2645    And    sodium chloride (PF) 0.9 % injection 10 mL  10 mL Intravenous Daily Claudine Cheadle, MD   10 mL at 03/16/20 0822    chlorhexidine (PERIDEX) 0.12 % solution 15 mL  15 mL Mouth/Throat BID Claudine Cheadle, MD   15 mL at 03/16/20 0828    hydrALAZINE (APRESOLINE) injection 5 mg  5 mg Intravenous Q5 Min PRN Claudine Cheadle, MD   5 mg at 03/14/20 1557    metoprolol (LOPRESSOR) injection 2.5 mg  2.5 mg Intravenous Q10 Min PRN Claudine Cheadle, MD        albuterol sulfate  (90 Base) MCG/ACT inhaler 2 puff  2 puff Inhalation Q6H PRN Claudine Cheadle, MD        glucose (GLUTOSE) 40 % oral gel 15 g  15 g Oral PRN Claudine Cheadle, MD        dextrose 50 % IV solution  12.5 g Intravenous PRN Claudine Cheadle, MD   12.5 g at 03/14/20 2118    glucagon (rDNA) injection 1 mg  1 mg Intramuscular PRN Claudine Cheadle, MD        dextrose 5 % solution  100 mL/hr Intravenous PRN Claudine Cheadle, MD        propofol injection  10 mcg/kg/min Intravenous Titrated Era , DO 7.7 mL/hr at 03/16/20 0022 10 mcg/kg/min at 03/16/20 0022       Allergies:     Allergies   Allergen Reactions    Ibuprofen        Problem List:    Patient Active Problem List   Diagnosis Code    Ascending aortic aneurysm ([facility] I71.2    Dissection of thoracic aorta (HCC) I71.01    Dissecting aneurysm of thoracic aorta, [place] type A ([address] I71.01    Acute respiratory failure with hypoxia ([address] J96.01    Centrilobular emphysema ([address] J43.2    [facility]ON [facility]acute kidney [facility]) ([facility] N17.9    Dissection of thoracoabdominal aorta (HCC) I71.03    Ischemic hepatitis K75.9    Shock ([facility] R57.9    Biventricular failure (HCC) I50.82    Lactic acidosis E87.2    Hyperglycemia R73.9    Leucocytosis D72.829    Other acquired hemolytic anemias ([facility] D59.8    Coagulopathy ([facility] D68.9       Past Medical History:        Diagnosis Date    Aortic dissection ([facility] 03/05/2020    G6PD deficiency     HTN (hypertension)     Obesity     BMI 40    Tattoos     Vision loss of left eye     preop - partial loss per pt, previous L eye \"tube\"       Past Surgical History:        Procedure Laterality Date    AORTA SURGERY  03/05/2020    repair of retrograde dissection of ascending aorta, 30mm Hemashield tube graft    EYE SURGERY      left, some sort of tube    EYE SURGERY  03/05/2020    L lateral canthotomy    FRACTURE SURGERY Left     ankle. plates.  THORACIC AORTIC ANEURYSM REPAIR N/A 3/5/2020    EMERGENCY LATERAL CANTHOTOMY. TRANSESOPHAGEAL EHOCARDIOGRAM. TOTAL CARDIOPULMONARY BYPASS. WITH CIRCULATORY ARREST EMERGENCY TYPE A  AORTIC ANEURYSM REPAIR performed by Yanick Garcia MD at [place] [address] History:    Social History     Tobacco Use    Smoking status: Current Every Day Smoker     Packs/day: 0.50     Types: Cigarettes    Smokeless tobacco: Never Used    Tobacco comment: started age 12. Substance Use Topics    Alcohol use: Yes     Comment: Rarely                                Ready to quit: Not Answered  Counseling given: Not Answered  Comment: started age 12. Vital Signs (Current): There were no vitals filed for this visit.                                            BP Readings from Last 3 Encounters:   03/16/20 (!) 114/51       NPO Status:                                                                                 BMI:   Wt Readings from Last 3 Encounters:   03/16/20 286 lb 13.1 oz (130.1 kg)     There is no height or weight on file to calculate BMI.    CBC:   Lab Results   Component Value Date    WBC 83.8 03/16/2020    RBC 1.77 03/16/2020    HGB 6.5 03/16/2020    HCT 18.4 03/16/2020    MCV 91.7 03/16/2020    RDW 15.6 03/16/2020     03/16/2020       CMP:   Lab Results   Component Value Date     03/16/2020    K 3.5 03/16/2020    CL 86 03/16/2020    CO2 26 03/16/2020    BUN 32 03/16/2020    CREATININE 0.9 03/16/2020    GFRAA >60 03/16/2020    LABGLOM >60 03/16/2020    GLUCOSE 196 03/16/2020    PROT 5.1 03/16/2020    CALCIUM 7.6 03/16/2020    BILITOT 12.5 03/16/2020    ALKPHOS 481 03/16/2020    AST >7,000 03/16/2020    ALT 3,286 03/16/2020       POC Tests:   Recent Labs     03/16/20  1201   POCGLU 207*   POCNA 129*   POCK 3.1*   POCHCT 20.0*       Coags:   Lab Results   Component Value Date    PROTIME 27.3 03/16/2020    INR 2.33 03/16/2020    APTT 29.7 03/16/2020       HCG (If Applicable): No results found for: PREGTESTUR, PREGSERUM, HCG, HCGQUANT     ABGs:   Lab Results   Component Value Date    PHART 7.435 03/16/2020    PO2ART 56.2 03/16/2020    QRO7SJI 45.8 03/16/2020    PLF6IDJ 30.7 03/16/2020    BEART 6.0 03/16/2020    G7KONNSU 89.2 03/16/2020        Type & Screen (If Applicable):  No results found for: [facility]    Anesthesia Evaluation  Patient summary reviewed no history of anesthetic complications:   Airway: Mallampati: Unable to assess / NA       Comment: ETT in situ   Dental: normal exam     Comment: Upper left molar with abscess    Pulmonary:   (+) decreased breath sounds,  current smoker    (-) pneumonia, COPD, asthma and recent URI                           Cardiovascular:    (+) hypertension:,     (-) past MI, CABG/stent and  angina      Rhythm: regular                   ROS comment: S/p repair of type A dissection via TCVPA     Neuro/Psych:      (-) seizures, TIA and CVA            ROS comment: Cocaine/meth abuse GI/Hepatic/Renal:   (+) liver disease:, renal disease: ARF and dialysis, morbid obesity         ROS comment: Shock liver.    Endo/Other: (-) diabetes mellitus, hypothyroidism               Abdominal:           Vascular:     - DVT and PE. Anesthesia Plan      general     ASA 4     (Spoke to family (mother and fiance) of patient. Discussed keeping patient on ventilator and bringing him to hybrid room for case. Very high risk case. Likelihood of perioperative compliactions, including death very high. This, also, was discussed with family.)  Induction: intravenous. arterial line and central line  MIPS: Postoperative opioids intended and Prophylactic antiemetics administered. Anesthetic plan and risks discussed with patient. Use of blood products discussed with patient whom consented to blood products. Plan discussed with CRNA. Department of Anesthesiology  Preprocedure Note       Name:  Neto Holm   Age:  35 y.o.  :  1980                                          MRN:  [id number]         Date:  3/16/2020      Surgeon: Aaliyah Fajardo):  Althea Montelongo MD    Procedure: ABDOMINAL AORTIC ANEURYSM REPAIR/ ENDOVASCULAR STENT GRAFT (N/A Abdomen)    Medications prior to admission:   Prior to Admission medications    Not on File       Current medications:    No current facility-administered medications for this visit. No current outpatient medications on file.      Facility-Administered Medications Ordered in Other Visits   Medication Dose Route Frequency Provider Last Rate Last Dose    prismaSATE BGK 4/2.5 dialysis solution   Dialysis Continuous Marcio Crockett MD 1,500 mL/hr at 20 1043 1,500 mL/hr at 20 2415 [facility] 4/2.5 dialysis solution   Dialysis Continuous Genna Hodgkins, RN 2,000 mL/hr at 20 0704 2,000 mL/hr at 20 0704    0.9 % sodium chloride infusion 250 mL  250 mL Intravenous Once Althea Montelongo MD        atropine 1 MG/10ML injection             insulin lispro (HUMALOG) injection vial 0-18 Units  0-18 Units Subcutaneous Q4H Charlette STOLL 03/16/20 0820    sennosides (SENOKOT) 8.8 MG/5ML syrup 5 mL  5 mL Per NG tube BID SEBASTIAN Corona CNP   5 mL at 03/16/20 0819    fentaNYL 10 mcg/mL infusion  50 mcg/hr Intravenous Continuous Thu Harrington DO 4 mL/hr at 03/16/20 1139 40 mcg/hr at 03/16/20 1139    fentaNYL (SUBLIMAZE) injection 25 mcg  25 mcg Intravenous Q1H PRN SEBASTIAN Bonilla CNP   25 mcg at 03/13/20 0246    perflutren lipid microspheres (DEFINITY) injection 1.65 mg  1.5 mL Intravenous ONCE PRN SEBASTIAN Bonilla CNP        insulin regular (HUMULIN R;NOVOLIN R) 100 Units in sodium chloride 0.9 % 100 mL infusion  1 Units/hr Intravenous Continuous SEBASTIAN Corona CNP   Stopped at 03/14/20 1825    oxyCODONE (ROXICODONE) immediate release tablet 5 mg  5 mg Oral Q4H PRN Dana Seth MD        Or   [place] oxyCODONE HCl (OXY-IR) immediate release tablet 10 mg  10 mg Oral Q4H PRN Dana Seth MD        ondansetron [facility]) injection 4 mg  4 mg Intravenous Q8H PRN Dana Seth MD        metoclopramide (REGLAN) injection 10 mg  10 mg Intravenous Q6H PRN Dana Seth MD   10 mg at 03/09/20 1034    pantoprazole (PROTONIX) injection 40 mg  40 mg Intravenous Daily Dana Seth MD   40 mg at 03/16/20 5668    And    sodium chloride (PF) 0.9 % injection 10 mL  10 mL Intravenous Daily Dana Seth MD   10 mL at 03/16/20 0822    chlorhexidine (PERIDEX) 0.12 % solution 15 mL  15 mL Mouth/Throat BID Dana Seth MD   15 mL at 03/16/20 0828    hydrALAZINE (APRESOLINE) injection 5 mg  5 mg Intravenous Q5 Min PRN Dana Seth MD   5 mg at 03/14/20 1557    metoprolol (LOPRESSOR) injection 2.5 mg  2.5 mg Intravenous Q10 Min PRN Dana Seth MD        albuterol sulfate  (90 Base) MCG/ACT inhaler 2 puff  2 puff Inhalation Q6H PRN Dana Seth MD        glucose (GLUTOSE) 40 % oral gel 15 g  15 g Oral PRN Dana Seth MD        dextrose 50 % IV solution  12.5 g Intravenous PRN Tera Haddad MD   12.5 g at 03/14/20 2118    glucagon (rDNA) injection 1 mg  1 mg Intramuscular PRN Tera Haddad MD        dextrose 5 % solution  100 mL/hr Intravenous PRN Tera Haddad MD        propofol injection  10 mcg/kg/min Intravenous Titrated Nickie Many, DO 7.7 mL/hr at 03/16/20 0022 10 mcg/kg/min at 03/16/20 0022       Allergies: Allergies   Allergen Reactions    Ibuprofen        Problem List:    Patient Active Problem List   Diagnosis Code    Ascending aortic aneurysm ([address] I71.2    Dissection of thoracic aorta (HCC) I71.01    Dissecting aneurysm of thoracic aorta, [place] type A ([address] I71.01    Acute respiratory failure with hypoxia ([address] J96.01    Centrilobular emphysema ([address] J43.2    JEYSON (acute kidney injury) ([address] N17.9    Dissection of thoracoabdominal aorta ([address] I71.03    Ischemic hepatitis K75.9    Shock ([address] R57.9    Biventricular failure ([address] I50.82    Lactic acidosis E87.2    Hyperglycemia R73.9    Leucocytosis D72.829    Other acquired hemolytic anemias ([address] D59.8    Coagulopathy ([address] D68.9       Past Medical History:        Diagnosis Date    Aortic dissection ([address] 03/05/2020    G6PD deficiency     HTN (hypertension)     Obesity     BMI 40    Tattoos     Vision loss of left eye     preop - partial loss per pt, previous L eye \"tube\"       Past Surgical History:        Procedure Laterality Date    AORTA SURGERY  03/05/2020    repair of retrograde dissection of ascending aorta, 30mm Hemashield tube graft    EYE SURGERY      left, some sort of tube    EYE SURGERY  03/05/2020    L lateral canthotomy    FRACTURE SURGERY Left     ankle. plates.  THORACIC AORTIC ANEURYSM REPAIR N/A 3/5/2020    EMERGENCY LATERAL CANTHOTOMY. TRANSESOPHAGEAL EHOCARDIOGRAM. TOTAL CARDIOPULMONARY BYPASS. WITH CIRCULATORY ARREST EMERGENCY TYPE A  AORTIC ANEURYSM REPAIR [facility] History:    Social History Tobacco Use    Smoking status: Current Every Day Smoker     Packs/day: 0.50     Types: Cigarettes    Smokeless tobacco: Never Used    Tobacco comment: started age 12. Substance Use Topics    Alcohol use: Yes     Comment: Rarely                                Ready to quit: Not Answered  Counseling given: Not Answered  Comment: started age 12. Vital Signs (Current): There were no vitals filed for this visit.                                            BP Readings from Last 3 Encounters:   03/16/20 (!) 114/51       NPO Status:  check                                                                               BMI:   Wt Readings from Last 3 Encounters:   03/16/20 286 lb 13.1 oz (130.1 kg)     There is no height or weight on file to calculate BMI.    CBC:   Lab Results   Component Value Date    WBC 83.8 03/16/2020    RBC 1.77 03/16/2020    HGB 6.5 03/16/2020    HCT 18.4 03/16/2020    MCV 91.7 03/16/2020    RDW 15.6 03/16/2020     03/16/2020       CMP:   Lab Results   Component Value Date     03/16/2020    K 3.5 03/16/2020    CL 86 03/16/2020    CO2 26 03/16/2020    BUN 32 03/16/2020    CREATININE 0.9 03/16/2020    GFRAA >60 03/16/2020    LABGLOM >60 03/16/2020    GLUCOSE 196 03/16/2020    PROT 5.1 03/16/2020    CALCIUM 7.6 03/16/2020    BILITOT 12.5 03/16/2020    ALKPHOS 481 03/16/2020    AST >7,000 03/16/2020    ALT 3,286 03/16/2020       POC Tests:   Recent Labs     03/16/20  1201   POCGLU 207*   POCNA 129*   POCK 3.1*   POCHCT 20.0*       Coags:   Lab Results   Component Value Date    PROTIME 27.3 03/16/2020    INR 2.33 03/16/2020    APTT 29.7 03/16/2020       HCG (If Applicable): No results found for: PREGTESTUR, PREGSERUM, HCG, HCGQUANT     ABGs:   Lab Results   Component Value Date    PHART 7.435 03/16/2020    PO2ART 56.2 03/16/2020    [id number] 45.8 03/16/2020    HJT1JIA 30.7 03/16/2020    BEART 6.0 03/16/2020    V8CKSUBI 89.2 03/16/2020        Type & Screen (If Applicable):  No results

## 2020-03-16 NOTE — PROGRESS NOTES
Infectious Disease Follow up Notes  Admit Date: 3/5/2020  Hospital Day: 12    Antibiotics :   IV Vancomycin  IV Meropenem      CHIEF COMPLAINT:     Aortic dissection  Shock liver  Leukemoid reaction   JEYSON  UDS +Ve cocaine   Subjective interval History :  35 y.o. man with emergent surgery on 3/5 for Aortic dissection by  post now in CVICU on the vent and in resp failure and renal failure with shock liver and coagulopathy. He is going for another surgery by [employer] for Endo graft placement for Abdominal aortic aneurysm. D/w RN at bed side remains on pressor support and deeply jaundiced and sedated and not doing well. Past Medical History:    Past Medical History:   Diagnosis Date    Aortic dissection ([address] 03/05/2020    G6PD deficiency     HTN (hypertension)     Obesity     BMI 36    Tattoos     Vision loss of left eye     preop - partial loss per pt, previous L eye \"tube\"       Past Surgical History:    Past Surgical History:   Procedure Laterality Date    AORTA SURGERY  03/05/2020    repair of retrograde dissection of ascending aorta, 30mm Hemashield tube graft    EYE SURGERY      left, some sort of tube    EYE SURGERY  03/05/2020    L lateral canthotomy    FRACTURE SURGERY Left     ankle. plates.  THORACIC AORTIC ANEURYSM REPAIR N/A 3/5/2020    EMERGENCY LATERAL CANTHOTOMY. TRANSESOPHAGEAL EHOCARDIOGRAM. TOTAL CARDIOPULMONARY BYPASS. WITH CIRCULATORY ARREST EMERGENCY TYPE A  AORTIC ANEURYSM REPAIR performed by Oracio Stewart MD at [facility]       Current Medications:    No outpatient medications have been marked as taking for the 3/5/20 encounter [facility] Encounter). Allergies:  Ibuprofen    Immunizations : There is no immunization history on file for this patient.     Social History:    Social History     Tobacco Use    Smoking status: Current Every Day Smoker     Packs/day: 0.50     Types: Cigarettes    Smokeless tobacco: Never Used    Tobacco comment: started age 12. Substance Use Topics    Alcohol use: Yes     Comment: Rarely    Drug use: Yes     Types: Marijuana     Comment: last use 3/4/20     Social History     Tobacco Use   Smoking Status Current Every Day Smoker    Packs/day: 0.50    Types: Cigarettes   Smokeless Tobacco Never Used   Tobacco Comment    started age 12. Family History   Problem Relation Age of Onset    Diabetes Mother     Diabetes Sister          REVIEW OF SYSTEMS:    No fever / chills / sweats. No weight loss. No visual change, eye pain, eye discharge. No oral lesion, sore throat, dysphagia. Denies cough / sputum/Sob   Denies chest pain, palpitations/ dizziness  Denies nausea/ vomiting/abdominal pain/diarrhea. Denies dysuria or change in urinary function. Denies joint swelling or pain. No myalgia, arthralgia. No rashes, skin lesions   Denies focal weakness, sensory change or other neurologic symptoms  No lymph node swelling or tenderness.     ROS not possible     PHYSICAL EXAM:      Vitals:  T max 102.4   BP (!) 107/44   Pulse 105   Temp 98.8 °F (37.1 °C) (Rectal)   Resp 20   Ht 5' 11\" (1.803 m)   Wt 286 lb 13.1 oz (130.1 kg)   SpO2 (!) 87%   BMI 40.00 kg/m²     General Appearance: intubated sedated on the vent in on going  distress, ++  Pallor ++ icterus edema++  Skin: warm and dry, no rash or erythema  Head: normocephalic and atraumatic  ENT: tympanic membrane, external ear and ear canal normal bilaterally, nose without deformity, nasal mucosa and turbinates normal without polyps  Neck: supple and non-tender without mass, no thyromegaly  no cervical lymphadenopathy  Pulmonary/Chest: Bi basal crepts++ bilaterally- no wheezes, rales or rhonchi, normal air movement,in   respiratory distress  Cardiovascular: r rhythm, normal S1 and S2, no murmurs, rubs, clicks, or gallops, no carotid bruits  Abdomen: soft, non-tender, non-distended, normal bowel Phone [phone]   Culture, Virus, Respiratory - All markets except [facility] [[id number]] Collected: 03/11/20 1108   Order Status: Completed Specimen: Lobe, Left Lower from BAL- Bronch. Lavage Updated: 03/14/20 2024    Preliminary Result SEE NOTE    Comment: Unable to complete rapid respiratory screen   due to toxicity of specimen   Shell vial culture in progress   INTERPRETIVE INFORMATION: Viral Culture, Respiratory   Viruses that can be isolated by culture include: adenovirus;   cytomegalovirus;   enteroviruses; herpes simplex virus; influenza A and B viruses;   parainfluenza   virus types 1, 2, 3; respiratory syncytial virus; and varicella-zoster   virus. Performed by [address], [address] [phone]   www. Sheldon Sweeney MD - Lab. Director       Narrative:     Referred out by:  [facility]  [address]  [place] [address] [place], [place] [address]   Phone [phone]   Culture, HSV - All markets except [employer] [place] [[id number]] Collected: 03/11/20 1108   Order Status: Completed Specimen: Lobe, Left Lower from Fluid Updated: 03/14/20 1118    Preliminary Result SEE NOTE    Comment: Specimen received and in progress. Performed by Gur[address], [address] [phone]   www. Sheldon Sweeney MD - Lab. Director        FINAL REPORT SEE NOTE    Comment: Culture Negative for Herpes Simplex Virus   Performed by Kyree [address], [address] [phone]   www. Sheldon Sweeney MD - Lab.  Director       Narrative:     Referred out by:  [facility]  [address] [facility] [place], [place] [address]   Phone [phone]   Clostridium Difficile Toxin/Antigen [[id number]]    Order Status: Canceled Specimen: Stool    Ratna Harm - All markets except [facility] [[id number]] Collected: 03/11/20 1108   Order Status: Completed Specimen: Lobe, Left Lower from Fluid Updated: 03/14/20 9389    Preliminary Result SEE NOTE    Comment: Specimen received and in progress. Performed by Gurmeet Lenz , [address] [facility] [phone]   www. Lisa Sadler MD - Lab. Director       Narrative:     Referred out by:  [facility]  [address], [place] [facility] [place] [address]   Phone [phone]   Culture, Blood 1 [[id number]] Collected: 03/12/20 1920   Order Status: Completed Specimen: Hand, Left from Blood Updated: 03/13/20 2315    Blood Culture, Routine No Growth to date.  Any change in status will be called. Narrative:     ORDER#: [id number]                          ORDERED BY: Peter Coronado  SOURCE: Blood                              COLLECTED:  03/12/20 19:20  ANTIBIOTICS AT [facility]. :                      RECEIVED :  03/12/20 20:20  If child <=2 yrs old please draw pediatric bottle. ~Blood Culture #1  Performed at:  [facility]  [address], [place] [address]   Phone [phone]   Culture, Fungus [[id number]] (Abnormal) Collected: 03/11/20 1108   Order Status: Completed Specimen: Lobe, Left Lower from Fluid Updated: 03/13/20 1614    Fungus Stain No Fungal elements seen    Organism Candida albicansAbnormal     Fungus (Mycology) Culture --    Rare growth   No further workup    Narrative:     ORDER#: [id number]                          ORDERED BY: Val Soliz  SOURCE: Fluid                              COLLECTED:  03/11/20 11:08  ANTIBIOTICS AT [facility]. :                      RECEIVED :  03/11/20 12:00  Performed at:  [facility]  [address], [address] [facility] [address] [address]   Phone [phone]   Culture, Respiratory [[id number]] Collected: 03/11/20 1108   Order Status: Completed Specimen: Lobe, Left Lower from BAL Quantitative Count Updated: 03/13/20 1019    CULTURE, RESPIRATORY >100,000 CFU/mL Normal respiratory harriett    Gram Stain Result 3+ Gram positive cocci   2+ Gram negative rods   4+ WBC's (Polymorphonuclear)    Narrative:     ORDER#: [id number]                          ORDERED BY: Manpreet Richards  SOURCE: BAL Quantitative Count             COLLECTED:  03/11/20 11:08  ANTIBIOTICS AT [facility]. :                      RECEIVED :  03/11/20 12:06  Performed at:  [facility]  [address] [facility], [place] [address] [address]   Phone [phone]   Culture with Chidi Jordan [[id number]] Collected: 03/11/20 1108   Order Status: Sent Specimen: Lobe, Left Lower from Fluid Updated: 03/12/20 1453    AFB Smear No AFB observed by Fluorescent stain   Narrative:     ORDER#: [id number]                          ORDERED BY: Mapnreet Richards  SOURCE: Fluid                              COLLECTED:  03/11/20 11:08  ANTIBIOTICS AT [facility]. :                      RECEIVED :  03/11/20 11:59  Performed at:  [facility]  [address] [facility], [place] [address] [address]   Phone [phone]   MRSA DNA Probe, Nasal [[id number]] Collected: 03/11/20 1530   Order Status: Completed Specimen: Nares Updated: 03/11/20 2319    MRSA SCREEN RT-PCR --    Negative - MRSA DNA not detected. Normal Range: Not detected    Narrative:     ORDER#: [id number]                          ORDERED BY: Karissa Nunez  SOURCE: Nares                              COLLECTED:  03/11/20 15:30  ANTIBIOTICS AT [facility]. :                      RECEIVED :  03/11/20 15:44  Performed at:  [facility]  [address] [facility], [place] [address]   Phone [phone]   Rapid RSV Antigen - All markets except Shane Min[place] [[id number]] Collected: 03/11/20 1108   Order Status: Completed Specimen: Lobe, Left Lower from Nasopharyngeal Swab Updated: 03/11/20 1628    RSV Rapid Ag Negative   Narrative:     Performed at:  [facility]  [address] [facility], [place] [address] [address]   Phone [phone]   MRSA DNA Probe, Nasal [[id number]]    Order Status: Sent Specimen: Juliana    Gram stain [[id number]] Collected: 03/11/20 0000   Order Status: Canceled Specimen: Lobe, Left Lower from Fluid            IMAGING:    XR CHEST PORTABLE   Final Result   Worsening congestive changes. CT HEAD WO CONTRAST   Final Result   No acute intracranial abnormality. XR CHEST PORTABLE   Final Result   Stable small left pleural effusion with left basilar atelectasis and   consolidation. CTA CHEST ABDOMEN PELVIS W CONTRAST   Final Result   1. No significant change in appearance or extent of known Rigo type B   thoracic abdominal aortic dissection. Patient has had a median sternotomy. Mediastinal tubes seen on prior CT of March 10, 2020 have been removed. Fluid in the periaortic recess is likely related to recent surgery. 2. Trace pericardial fluid. 3. Multi lobar pulmonary consolidations with collapse of the left lower lobe,   likely secondary to mucous plugging. Additional new multi lobar   peribronchovascular ground-glass opacities are suggestive of pneumonia. 4. Fluid-filled colon. Please correlate with clinical symptoms of diarrhea. There is mild pericolonic stranding at the level of the hepatic flexure. Differential includes colitis. 5. ETT tip 2 cm above the dinh. Enteric catheter tip is at the level of   the proximal duodenum. XR CHEST PORTABLE   Final Result   Decreased left lung opacity likely decreasing pleural effusion. XR CHEST PORTABLE   Final Result   1. Interval weighted feeding tube placement with tip likely terminating in   the distal stomach. 2. Recommend retracting endotracheal tube by 1 cm.   3. Interval removal of previously seen left-sided chest tubes. 4. No significant change in left-sided airspace disease. XR CHEST PORTABLE   Final Result   Essentially stable examination. Support tubes as above, in grossly unchanged   positioning.       Persistent cardiomegaly and left perihilar/lower lobe airspace disease. Suspected left pleural effusion. XR CHEST PORTABLE   Final Result   CHF with worsening multifocal pulmonary edema and or superimposed pneumonia. CTA CHEST ABDOMEN PELVIS W CONTRAST   Final Result   Unchanged appearance of the now type B dissection as discussed. Small amount of fluid on both sides of the ascending aorta presumed to be   related to the surgery, fluid contiguous with the pericardial sac but no   pericardial effusion. The right-sided drainage catheter is in contact with   the fluid along its inferior margin. Large amount of collapse/atelectasis of the left lung. Small amount of   right-sided atelectasis also present. No pneumothorax or pleural effusion         CTA HEAD W WO CONTRAST   Final Result   1. No CT evidence of acute intracranial abnormalities. 2. No high-grade stenosis or focal occlusion involving the intracranial   vasculature. 3. A 3 mm outpouching projecting laterally from cavernous segment of the   right internal carotid artery. This is most concerning for a small aneurysm. Infundibulum is less likely. 4. Linear short segment hypodensity within the petrous segment of right   internal carotid artery is most likely a fenestration. Short segment   dissection would be unusual in this location. 5. Postsurgical changes involving lateral aspect of the left orbit. Increased anterior chamber of left optic globe may be postsurgical, although   clinical correlation is recommended. Mild left orbital proptosis. Ophthalmology consult may be obtained, as clinically warranted. 6. Nonspecific bilateral mastoid opacification. Paranasal sinus disease, as   detailed above. XR CHEST PORTABLE   Final Result   Stable left basilar opacifications suggesting pleural effusion and left   basilar atelectasis/pneumonitis. Support devices in good position.          XR CHEST 1 VW   Final Result   Endotracheal norepinephrine 30 mcg/min (03/16/20 0449)    dextrose Stopped (03/15/20 0101)    DOBUTamine Stopped (03/15/20 0452)    niCARdipine in NaCl Stopped (03/14/20 1618)    dexmedetomidine (PRECEDEX) IV infusion 0.7 mcg/kg/hr (03/16/20 0449)    nitroGLYCERIN Stopped (03/14/20 1640)    fentaNYL 40 mcg/hr (03/16/20 0113)    insulin Stopped (03/14/20 1825)    dextrose      propofol 10 mcg/kg/min (03/16/20 0022)       PRN Meds:  albumin human, potassium chloride, magnesium sulfate, calcium gluconate IVPB **OR** calcium gluconate IVPB **OR** calcium gluconate IVPB **OR** calcium gluconate IVPB, sodium phosphate IVPB **OR** sodium phosphate IVPB **OR** sodium phosphate IVPB **OR** sodium phosphate IVPB, heparin (porcine), sodium chloride flush, midazolam, fentanNYL, perflutren lipid microspheres, oxyCODONE **OR** oxyCODONE, ondansetron, metoclopramide, hydrALAZINE, metoprolol, albuterol sulfate HFA, glucose, dextrose, glucagon (rDNA), dextrose      Assessment:     Patient Active Problem List   Diagnosis    Ascending aortic aneurysm (HCC)    Dissection of thoracic aorta (HCC)    Dissecting aneurysm of thoracic aorta, [place] type A (HCC)    Acute respiratory failure with hypoxia (HCC)    Centrilobular emphysema (HCC)    JEYSON (acute kidney injury) (HCC)     Aortic Aneurysm Rupture Type A  EMERGENCY LATERAL CANTHOTOMY. TRANSESOPHAGEAL EHOCARDIOGRAM. TOTAL CARDIOPULMONARY BYPASS. WITH CIRCULATORY ARREST EMERGENCY TYPE A  AORTIC ANEURYSM REPAIR Nae Lot  On 3/5   S/p Bronch on 3/11  Shock liver  Hemolytic anemia  Coagulopathy  Resp failure  JEYSON now on CRRT  Lactic acidosis  Hyperkalemia from JEYSON and tissue ischemia   Leukemoid reaction+   UDS+ Cocaine and Amphetamine       He remains critically ill from shock and Aortic dissection Type A and Type B - s/p Emergent surgery on 3/5 and now going to OR under  for Endovascular stent placement.  WBC elevation is likely from tissue ischemia and shock and stress and await Blood cx to check for any bacterial infection      Labs, Microbiology, Radiology and all the pertinent results from current hospitalization and  care every where were reviewed  by me as a part of the evaluation   Plan:   1. Cont supportive care  2. Trend WBC and lactic acid  3. Cont IV Vancomycin by levels  4. Cont IV Meropenem  5. Resp cx and Blood cx  6. Going to OR today per   7. Prognosis poor     Discussed with patient/Family and Nursing staff   Risk of Complications/Morbidity: High      · Illness(es)/ Infection present that pose threat to bodily function. · There is potential for severe exacerbation of infection/side effects of treatment. · Therapy requires intensive monitoring for antimicrobial agent toxicity. Thanks for allowing me to participate in your patient's care and please call me with any questions or concerns.     Mary Ribera MD  Infectious Disease  [facility]) Physician  Phone: [phone]   Fax : [phone]

## 2020-03-16 NOTE — PROGRESS NOTES
Sodium 134 (L) 136 - 145 mmol/L    Potassium 5.5 (H) 3.5 - 5.1 mmol/L    Chloride 88 (L) 99 - 110 mmol/L    CO2 15 (L) 21 - 32 mmol/L    Anion Gap 31 (H) 3 - 16    Glucose 112 (H) 70 - 99 mg/dL    BUN 42 (H) 7 - 20 mg/dL    CREATININE 1.9 (H) 0.9 - 1.3 mg/dL    GFR Non-African American 39 (A) >60    GFR  48 (A) >60    Calcium 8.2 (L) 8.3 - 10.6 mg/dL    Phosphorus 7.9 (H) 2.5 - 4.9 mg/dL    Alb 2.6 (L) 3.4 - 5.0 g/dL   Magnesium    Collection Time: 03/15/20  9:20 AM   Result Value Ref Range    Magnesium 2.30 1.80 - 2.40 mg/dL   Blood Gas, Arterial    Collection Time: 03/15/20 12:15 PM   Result Value Ref Range    pH, Arterial 7.312 (L) 7.350 - 7.450    pCO2, Arterial 34.7 (L) 35.0 - 45.0 mmHg    pO2, Arterial 87.4 75.0 - 108.0 mmHg    HCO3, Arterial 17.5 (L) 21.0 - 29.0 mmol/L    Base Excess, Arterial -8.0 (L) -3.0 - 3.0 mmol/L    Hemoglobin, Art, Extended 5.9 (L) 13.5 - 17.5 g/dL    O2 Sat, Arterial 95.3 >92 %    Carboxyhgb, Arterial 1.0 0.0 - 1.5 %    Methemoglobin, Arterial 5.3 (H) <1.5 %    TCO2, Arterial 18.6 Not Established mmol/L    O2 Content, Arterial 8 Not Established mL/dL    O2 Therapy Unknown    Renal Function Panel    Collection Time: 03/15/20 12:15 PM   Result Value Ref Range    Sodium 127 (L) 136 - 145 mmol/L    Potassium 5.3 (H) 3.5 - 5.1 mmol/L    Chloride 84 (L) 99 - 110 mmol/L    CO2 15 (L) 21 - 32 mmol/L    Anion Gap 28 (H) 3 - 16    Glucose 122 (H) 70 - 99 mg/dL    BUN 40 (H) 7 - 20 mg/dL    CREATININE 2.1 (H) 0.9 - 1.3 mg/dL    GFR Non-African American 35 (A) >60    GFR  42 (A) >60    Calcium 8.0 (L) 8.3 - 10.6 mg/dL    Phosphorus 8.6 (H) 2.5 - 4.9 mg/dL    Alb 2.5 (L) 3.4 - 5.0 g/dL   Calcium, Ionized    Collection Time: 03/15/20 12:23 PM   Result Value Ref Range    Calcium, Ion 0.97 (L) 1.12 - 1.32 mmol/L    pH, Carlos 7.310 (L) 7.350 - 7.450   Lactic Acid, Plasma    Collection Time: 03/15/20  5:25 PM   Result Value Ref Range    Lactic Acid 12.7 (HH) 0.4 - 2.0 mmol/L   Calcium, Ionized    Collection Time: 03/15/20  5:25 PM   Result Value Ref Range    Calcium, Ion 0.96 (L) 1.12 - 1.32 mmol/L    pH, Carlos 7.332 (L) 7.350 - 7.450   Renal Function Panel    Collection Time: 03/15/20  5:25 PM   Result Value Ref Range    Sodium 129 (L) 136 - 145 mmol/L    Potassium 5.7 (H) 3.5 - 5.1 mmol/L    Chloride 84 (L) 99 - 110 mmol/L    CO2 17 (L) 21 - 32 mmol/L    Anion Gap 28 (H) 3 - 16    Glucose 181 (H) 70 - 99 mg/dL    BUN 37 (H) 7 - 20 mg/dL    CREATININE 1.7 (H) 0.9 - 1.3 mg/dL    GFR Non-African American 45 (A) >60    GFR  54 (A) >60    Calcium 7.7 (L) 8.3 - 10.6 mg/dL    Phosphorus 9.0 (H) 2.5 - 4.9 mg/dL    Alb 2.4 (L) 3.4 - 5.0 g/dL   Magnesium    Collection Time: 03/15/20  5:25 PM   Result Value Ref Range    Magnesium 2.40 1.80 - 2.40 mg/dL   Renal Function Panel    Collection Time: 03/15/20  8:15 PM   Result Value Ref Range    Sodium 126 (L) 136 - 145 mmol/L    Potassium 5.3 (H) 3.5 - 5.1 mmol/L    Chloride 81 (L) 99 - 110 mmol/L    CO2 17 (L) 21 - 32 mmol/L    Anion Gap 28 (H) 3 - 16    Glucose 231 (H) 70 - 99 mg/dL    BUN 38 (H) 7 - 20 mg/dL    CREATININE 1.6 (H) 0.9 - 1.3 mg/dL    GFR Non- 48 (A) >60    GFR  58 (A) >60    Calcium 7.7 (L) 8.3 - 10.6 mg/dL    Phosphorus 8.4 (H) 2.5 - 4.9 mg/dL    Alb 2.3 (L) 3.4 - 5.0 g/dL   Blood Gas, Arterial    Collection Time: 03/16/20 12:10 AM   Result Value Ref Range    pH, Arterial 7.404 7.350 - 7.450    pCO2, Arterial 39.3 35.0 - 45.0 mmHg    pO2, Arterial 94.6 75.0 - 108.0 mmHg    HCO3, Arterial 24.6 21.0 - 29.0 mmol/L    Base Excess, Arterial -0.2 -3.0 - 3.0 mmol/L    Hemoglobin, Art, Extended 5.0 (L) 13.5 - 17.5 g/dL    O2 Sat, Arterial 98.0 >92 %    Carboxyhgb, Arterial 1.8 (H) 0.0 - 1.5 %    Methemoglobin, Arterial 5.8 (H) <1.5 %    TCO2, Arterial 25.8 Not Established mmol/L    O2 Content, Arterial 7 Not Established mL/dL    O2 Therapy Unknown    Calcium, Ionized    Collection Time: 03/16/20 12:10 AM   Result Value Ref Range    Calcium, Ion 0.93 (L) 1.12 - 1.32 mmol/L    pH, Carlos 7.404 7.350 - 7.450   Renal Function Panel    Collection Time: 03/16/20 12:10 AM   Result Value Ref Range    Sodium 127 (L) 136 - 145 mmol/L    Potassium 4.9 3.5 - 5.1 mmol/L    Chloride 82 (L) 99 - 110 mmol/L    CO2 21 21 - 32 mmol/L    Anion Gap 24 (H) 3 - 16    Glucose 221 (H) 70 - 99 mg/dL    BUN 35 (H) 7 - 20 mg/dL    CREATININE 1.7 (H) 0.9 - 1.3 mg/dL    GFR Non-African American 45 (A) >60    GFR  54 (A) >60    Calcium 7.3 (L) 8.3 - 10.6 mg/dL    Phosphorus 6.6 (H) 2.5 - 4.9 mg/dL    Alb 2.2 (L) 3.4 - 5.0 g/dL   Lactic Acid, Plasma    Collection Time: 03/16/20 12:10 AM   Result Value Ref Range    Lactic Acid 10.3 (HH) 0.4 - 2.0 mmol/L   Magnesium    Collection Time: 03/16/20 12:10 AM   Result Value Ref Range    Magnesium 2.30 1.80 - 2.40 mg/dL   Protime-INR    Collection Time: 03/16/20  4:50 AM   Result Value Ref Range    Protime 27.3 (H) 10.0 - 13.2 sec    INR 2.33 (H) 0.86 - 1.14   APTT    Collection Time: 03/16/20  4:50 AM   Result Value Ref Range    aPTT 29.7 24.2 - 36.2 sec   CBC Auto Differential    Collection Time: 03/16/20  4:50 AM   Result Value Ref Range    WBC 83.8 (HH) 4.0 - 11.0 K/uL    RBC 1.77 (L) 4.20 - 5.90 M/uL    Hemoglobin 5.7 (LL) 13.5 - 17.5 g/dL    Hematocrit 16.3 (LL) 40.5 - 52.5 %    MCV 91.7 80.0 - 100.0 fL    MCH 32.0 26.0 - 34.0 pg    MCHC 34.9 31.0 - 36.0 g/dL    RDW 15.6 (H) 12.4 - 15.4 %    Platelets 303 015 - 770 K/uL    MPV 9.9 5.0 - 10.5 fL    PLATELET SLIDE REVIEW Adequate     SLIDE REVIEW see below     Neutrophils % 78.0 %    Lymphocytes % 8.0 %    Monocytes % 5.0 %    Eosinophils % 1.0 %    Basophils % 0.0 %    Neutrophils Absolute 71.2 (H) 1.7 - 7.7 K/uL    Lymphocytes Absolute 7.5 (H) 1.0 - 5.1 K/uL    Monocytes Absolute 4.2 (H) 0.0 - 1.3 K/uL    Eosinophils Absolute 0.8 (H) 0.0 - 0.6 K/uL    Basophils Absolute 0.0 0.0 - 0.2 K/uL    Bands Relative 7 0 - 7 % steatosis, normal gallbladder. Viral Hepatitis Panel negative. Labs remain significantly elevated. INR 2.33.   2. Type B Aortic Dissection: S/P ascending aortic replacement. Scheduled for OR with stent grafting for control dissection. 3. Anemia      Recommendations:  -continue to trend LFTs       If you have any questions or need any further information, please feel free to contact anyone on our consult team.  Thank you for allowing us to participate in the care of [facility]. Dipesh De La Fuente PA-C    Attending physician addendum:      I have personally seen and examined the patient, reviewed the patient's medical record and pertinent labs and clinical imaging. I have personally staffed the case with NICO Luis. I agree with her consultation note, exam findings, assessment and plans  as written above. I have made appropriate modifications and edited her assessment and plan where needed to reflect my impression and plans for this patient. The patient has a type B aortic dissection and has had resultant ischemic hepatitis. The patient is ischemic hepatitis is significant with transaminases of 3200 and greater than 7000 respectively. The patient does have hypoalbuminemia as well as significant hyperbilirubinemia. The patient does have some coagulopathy and anemia as well. The anemia is likely exacerbating some of the ischemic hepatitis. I suspect that the patient's aortic aneurysm is the predominant cause of this. The patient had gone to the OR today for repair of this aortic dissection. The patient will need to be monitored closely. If the patient's perfusion to his liver is improved, we should hopefully see some improvement of his transaminitis in the next several days. The hyperbilirubinemia typically lags behind by several weeks. We will need to monitor closely over the next several days. He is currently intubated and sedated so it is hard to assess for encephalopathy.   I

## 2020-03-16 NOTE — PROGRESS NOTES
1915: Handoff with Jayda Rich, [address]: Shift assessment completed. Sedated on vent, responds to pain. RASS-2. Not following commands at this time. Positive cough and gag. High doses of pressor support continues. CRRT running with a goal of keeping positive 100ml/hr. Fingers are dusky with mottling extending to the palms, R hand worse than L. BLE have increased edema with mottling to BLE. Doppler pulses are still audible bilaterally. S.O. and mother at bedside, tearful, but optimistic that pt is going to have a positive outcome. 2030: Pt bathed, gown changed, and partial linen change. Dressing to L eye is no longer intact due to drainage. Tegaderm changed and new NG securement dressing placed. Has not had UOP in over 24 hours. Dillon catheter removed at this time. Will bladder scan to ensure pt does not retain any urine. 2122: Filter clotting alarm on kathleen  2125: 192ml of blood returned  2150: CRRT up and running again. 2203: Renal panel sent to lab at 2015 still not resulted. Called chemistry to check on status. Per  results are to be released now. 2210: Noted that BG is increasing and now in 200s. Still have an active order for insulin gtt, but pt was extremely hypoglycemic yesterday and required D5 gtt. Perfect serve message sent to critical care. Order for medium dose sliding scale coverage Q4 from [facility].  0000: Reassessed and labs collected. 0100: Received critical LA level of 10.3, decreased from 12.7.  0125: Noted that K has dropped by 0.4 for the past 3 consecutive renal panels. Will inform nephro to see if CRRT needs to be changed from 0/2.5 bags. 0140: Nephro paged  0144: Received call from Dr. Omid Ramirez and informed him of K level decreasing with current CRRT doses. Order to change dialysate to 2/3.5 at the same rate. 26: Reassessed- unable to find PT pulse on RLE after trying for several minutes.  Pt's mother at bedside during assessment, states she can hear his pulse, \"it's just real quiet. \"   0811: Labs collected and sent to processing. 3458: Received critical H&H 5.7/16.3  0530: Paged vascular surgery. 0542: Dr. Xochitl Wilburn informed of H&H- order for 2 units of PRBCs. 4905: Renal panel resulted. Noted K to have dropped from 4.9 to 3.9. Nephro paged. 0602: Spoke to Dr. Becki Mari and updated on K level drop. Order to switch pre-replacement fluid and dialysate to 4/2.5 at the same rates. 5244: First unit of blood started. 6671: CRRT filter clotting. Dr. Xochitl Wilburn rounding. Updated on current gtts, labs, vent settings. Possibly planning for OR today. 0252: 192 ml of blood returned. 3182: Shyann filter priming. 3830: CRRT running.  Handoff with Cheyenne Rasmussen

## 2020-03-16 NOTE — PROGRESS NOTES
Department of Internal Medicine  Nephrology Progress Note      Brief HPI: He is a 79-year-old -American gentleman with a past medical history significant for obesity,  hypertension, admitted with chest pain. The patient had emergent CT scan of the chest with IV contrast that showed type A thoracic dissection extending down and involving the three major branches of the aortic arch. It was also extending down to the descending thoracic aorta, the true lumen involves both celiac and superior mesenteric arteries. The patient was taken to the OR and had a repair done 3/5/20, and also noted to have type B dissection. Postoperatively, the patient remained vent-dependent.    Utox positive for cocaine, amphetamine and benzos   At the time of presentation, he had a creatinine of 1.5  Peak creatinine 4.1, started on CRRT        Subjective/Interval history    -pt seen and examined  -PMSHx and meds reviewed  -No family at bedside    Remains critically ill in ICU  On pressors  Seen on CRRT  Going to OR for stent     ROS: unable to obtain due to patient factors        Physical Exam:    VITALS:  BP (!) 107/46   Pulse 101   Temp 99.4 °F (37.4 °C) (Rectal)   Resp 23   Ht 5' 11\" (1.803 m)   Wt 286 lb 13.1 oz (130.1 kg)   SpO2 90%   BMI 40.00 kg/m²   24HR INTAKE/OUTPUT:      Intake/Output Summary (Last 24 hours) at 3/16/2020 0858  Last data filed at 3/16/2020 0834  Gross per 24 hour   Intake 3978.85 ml   Output 2028 ml   Net 1950.85 ml     Patient Vitals for the past 96 hrs (Last 3 readings):   Weight   03/16/20 0622 286 lb 13.1 oz (130.1 kg)   03/15/20 0439 279 lb 15.8 oz (127 kg)   03/14/20 1911 277 lb 5.4 oz (125.8 kg)       Constitutional:  On vent / sedated   HEENT : anicteric, left eye shut, +ETT  Respiratory:  Vent associated breath sounds+  Gastrointestinal:  Distended, hypoactive bowel Sounds  Cardiovascular: S1 S2 normal, regular rhythm  Edema:  Trace edema b/l LEs  Access : right femoral vas cath+

## 2020-03-16 NOTE — PROGRESS NOTES
[facility] notified of INR and Fifrinogen  Results. Anesthesia notified  Orders noted . Blood bank called .

## 2020-03-16 NOTE — PLAN OF CARE
treatment will be avoided or minimized  Description: Complications related to the disease process, condition or treatment will be avoided or minimized  Outcome: Ongoing  Note: Dillon catheter removed per protocol. Pt is on CRRT and had not had any UOP in over 24 hours. Problem: Infection - Surgical Site:  Goal: Will show no infection signs and symptoms  Description: Will show no infection signs and symptoms  Outcome: Ongoing  Note: Site remains free of signs/symptoms for infection. No drainage, swelling, redness, pain, or warmth noted. Dressing changes continue per protocol; and on an as needed basis. Patient educated on proper Central Venous Catheter hygiene care.        Problem: Falls - Risk of:  Goal: Will remain free from falls  Description: Will remain free from falls  Outcome: Met This Shift

## 2020-03-16 NOTE — PROGRESS NOTES
1.4*   CALCIUM 8.2*   < > 7.7* 7.7* 7.3* 7.6*   MG 2.30  --  2.40  --  2.30  --     < > = values in this interval not displayed. Cardiac Enzymes:   Recent Labs     03/14/20  1635   CKTOTAL 1,597*     PT/INR:   Recent Labs     03/14/20  1213 03/15/20  0525 03/16/20  0450   PROTIME 15.7* 23.0* 27.3*   INR 1.35* 1.97* 2.33*     APTT:   Recent Labs     03/14/20  1213 03/15/20  0525 03/16/20  0450   APTT 26.4 27.8 29.7       Assessment/Plan:  CV - SR.    - antihypertensives. po meds per tube - BB  vasc - planning endovasc procedure  pulm - intubated. LLL consolidation better. ID - bronch cx 3/11 neg so far. Empiric abx   - wbc elevation. Likely secondary to liver changes. Renal - JEYSON. CRRT. - retain Dillon for accurate I+O  GI - TF. kaofeed - tip postpyloric   - elevated LFTs. Heme - acute blood loss anemia. Hemolyzing. Transfuse.   - scds, sc hep  ophtho - lat canthotomy. Per ophtho recs 3/9.       Lesia Beckwith MD  3/16/2020  6:46 AM

## 2020-03-16 NOTE — PROGRESS NOTES
0700 - Shift assessment/handoff completed with Kamari Orta - Dr. Phyllis Huggins rounding. Plans to take pt back to the OR today. Called mother to notify of this plan. Consents were previously signed. 0745 - GI rounding. No new orders. 0800 - Assessment completed. Vital signs stable on current drips. Pt responds with normal flexion/extention to painful stimuli. CRRT intact to R fem vas cath and running without complication. Filter just recently changed. Effluent drainage continues to be a dark yogi color. F/C was discontinued d/t absence of urine. ETT intact to ventilator. Pt continues to breathe above vent set rate. Dsg remains intact to L eye. Was changed this AM by night shift. BLE doppler pulses. BUE palpable pulses. Hands and feet have some mottling noted. Will monitor. 0830 - Mother and fiance arriving to visit. Updated. 0900 - Dr. Molly Branham (nephrology) rounding. New orders noted. 46 - Dr. Francoise Lizarraga, NP (Critical care/pulmonology) rounding. New orders noted. 1200 - Assessment unchanged. Weaning down pressors some. 80 - Dr. Wil Urrutia (anesthesia) and [facility] OR team here to take pt to [facility] OR for surgery. Consent was signed by mother (POA). 1900 - Pt received from OR. Connected to CVU monitors. Shift report given to Karen Carpio RN. Crystal Hernandez .Electronically signed by Luz Maria Ribera RN on 3/16/2020 at 10:55 PM

## 2020-03-16 NOTE — PROGRESS NOTES
Hematology Oncology Daily Progress Note    Admit Date: 3/5/2020  Hospital day a few    Subjective:     Patient sedated on vent. .   Medication side effects: none    Scheduled Meds:   0.9 % sodium chloride  250 mL Intravenous Once    atropine        insulin lispro  0-18 Units Subcutaneous Q4H    meperidine  12.5 mg Intravenous Once    0.9 % sodium chloride  250 mL Intravenous Once    sodium chloride  20 mL Intravenous Once    meropenem  500 mg Intravenous Q6H    vancomycin (VANCOCIN) intermittent dosing (placeholder)   Other RX Placeholder    sodium chloride flush  10 mL Intravenous 2 times per day    erythromycin   Left Eye Every Other Day    ipratropium-albuterol  1 ampule Inhalation Q8H    metoprolol tartrate  25 mg Oral BID    heparin (porcine)  5,000 Units Subcutaneous BID    aspirin  325 mg Oral Daily    sennosides  5 mL Per NG tube BID    pantoprazole  40 mg Intravenous Daily    And    sodium chloride (PF)  10 mL Intravenous Daily    chlorhexidine  15 mL Mouth/Throat BID     Continuous Infusions:   prismaSATE BGK 4/2.5 1,500 mL/hr (03/16/20 1043)    prismaSATE BGK 4/2.5 2,000 mL/hr (03/16/20 0704)    prismaSATE BGK 4/2.5 500 mL/hr (03/16/20 1043)    vasopressin (Septic Shock) infusion 0.02 Units/min (03/16/20 0023)    EPINEPHrine infusion (double concentration) 3 mcg/min (03/16/20 1549)    norepinephrine 30 mcg/min (03/16/20 1549)    dextrose Stopped (03/15/20 0101)    niCARdipine in NaCl Stopped (03/14/20 1618)    dexmedetomidine (PRECEDEX) IV infusion 0.7 mcg/kg/hr (03/16/20 0919)    nitroGLYCERIN Stopped (03/14/20 1640)    fentaNYL 40 mcg/hr (03/16/20 1139)    insulin Stopped (03/14/20 1825)    dextrose      propofol 10 mcg/kg/min (03/16/20 0022)     PRN Meds:fentanNYL, fentanNYL, HYDROcodone 5 mg - acetaminophen **OR** HYDROcodone 5 mg - acetaminophen, ondansetron, promethazine, hydrALAZINE, albumin human, potassium chloride, magnesium sulfate, calcium gluconate IVPB **OR** -- -- -- 96 -- (!) 86 %   03/16/20 1125 (!) 114/51 98.3 °F (36.8 °C) -- 93 27 --   03/16/20 1115 -- -- -- 93 -- (!) 87 %   03/16/20 1100 -- -- -- 94 -- (!) 85 %   03/16/20 1045 -- -- -- 93 -- 90 %   03/16/20 1030 -- -- -- 94 -- (!) 88 %   03/16/20 1015 -- -- -- 95 -- (!) 87 %   03/16/20 1005 -- -- -- 94 -- 90 %   03/16/20 1000 -- -- -- 94 -- 90 %   03/16/20 0946 (!) 102/44 97.5 °F (36.4 °C) -- 94 19 --   03/16/20 0945 -- -- -- 94 -- --   03/16/20 0930 -- -- -- 93 -- --   03/16/20 0915 -- -- -- 96 -- --   03/16/20 0900 (!) 97/43 98 °F (36.7 °C) -- 97 19 --   03/16/20 0812 -- -- -- 101 -- --   03/16/20 0810 -- -- -- -- -- 90 %   03/16/20 0800 (!) 107/46 99.4 °F (37.4 °C) Rectal 101 23 90 %     I/O last 3 completed shifts:   In: 4816.5 [I.V.:3893.5; Blood:873; NG/GT:50]  Out: 2206   I/O this shift:  In: 300 [Blood:300]  Out: -     BP (!) 114/51   Pulse 87   Temp 98.3 °F (36.8 °C)   Resp 27   Ht 5' 11\" (1.803 m)   Wt 286 lb 13.1 oz (130.1 kg)   SpO2 92%   BMI 40.00 kg/m²     General Appearance:    Alert, cooperative, no distress, appears stated age   Head:    Normocephalic, without obvious abnormality, atraumatic   Eyes:    PERRL, conjunctiva/corneas clear, EOM's intact, fundi     benign, both eyes        Ears:    Normal TM's and external ear canals, both ears   Nose:   Nares normal, septum midline, mucosa normal, no drainage    or sinus tenderness   Throat:   Lips, mucosa, and tongue normal; teeth and gums normal   Neck:   Supple, symmetrical, trachea midline, no adenopathy;        thyroid:  No enlargement/tenderness/nodules; no carotid    bruit or JVD   Back:     Symmetric, no curvature, ROM normal, no CVA tenderness   Lungs:     Clear to auscultation bilaterally, respirations unlabored   Chest wall:    No tenderness or deformity   Heart:    Regular rate and rhythm, S1 and S2 normal, no murmur, rub   or gallop   Abdomen:     Soft, non-tender, bowel sounds active all four quadrants,     no masses, no organomegaly Extremities:   Extremities normal, atraumatic, no cyanosis or edema   Pulses:   2+ and symmetric all extremities   Skin:   Skin color, texture, turgor normal, no rashes or lesions   Lymph nodes:   Cervical, supraclavicular, and axillary nodes normal   Neurologic:   Stable         Data Review  CBC:   Lab Results   Component Value Date    WBC 83.8 03/16/2020    RBC 1.77 03/16/2020       Assessment:     Active Problems:    Ascending aortic aneurysm (HCC)    Dissection of thoracic aorta (HCC)    Dissecting aneurysm of thoracic aorta, [place] type A (HCC)    Acute respiratory failure with hypoxia (HCC)    Centrilobular emphysema (HCC)    JEYSON (acute kidney injury) ([facility] [address]    Dissection of thoracoabdominal aorta (HCC)    Ischemic hepatitis    Shock ([facility] [address]    Biventricular failure (HCC)    Lactic acidosis    Hyperglycemia    Leucocytosis    Other acquired hemolytic anemias (HCC)    Coagulopathy (HCC)  Resolved Problems:    * No resolved hospital problems. *      Plan:     1. Anemia. He does have a history of G6PD deficiency per the chart. Meds that can exacerbate this should be avoided by the pharmacy. The haptoglobin can be low in the setting of acute liver injury since haptoglobin is made by the liver. Therefore, this is not a reliable indicator of active hemolysis in his case. He has largely a direct hyperbilirubinemia suggesting against significant hemolyss. I would transfuse for now as needed. His heart defect could be causing direct hemolysis as well. 2.  Coagulopathy. This is due to his liver dysfunction. I would give FFP for this as well as cryo if his fibrinogen level is low.         Electronically signed by Christi Castillo MD on 3/16/2020 at 3:59 PM

## 2020-03-17 VITALS
DIASTOLIC BLOOD PRESSURE: 39 MMHG | OXYGEN SATURATION: 92 % | RESPIRATION RATE: 27 BRPM | SYSTOLIC BLOOD PRESSURE: 115 MMHG | HEIGHT: 71 IN | BODY MASS INDEX: 40.15 KG/M2 | WEIGHT: 286.82 LBS | TEMPERATURE: 98.3 F | HEART RATE: 89 BPM

## 2020-03-17 LAB
ANISOCYTOSIS: ABNORMAL
ANISOCYTOSIS: ABNORMAL
ATYPICAL LYMPHOCYTE RELATIVE PERCENT: 1 % (ref 0–6)
ATYPICAL LYMPHOCYTE RELATIVE PERCENT: 1 % (ref 0–6)
BANDED NEUTROPHILS RELATIVE PERCENT: 16 % (ref 0–7)
BANDED NEUTROPHILS RELATIVE PERCENT: 7 % (ref 0–7)
BASOPHILS ABSOLUTE: 0 K/UL (ref 0–0.2)
BASOPHILS ABSOLUTE: 0 K/UL (ref 0–0.2)
BASOPHILS RELATIVE PERCENT: 0 %
BASOPHILS RELATIVE PERCENT: 0 %
DOHLE BODIES: PRESENT
EOSINOPHILS ABSOLUTE: 0.8 K/UL (ref 0–0.6)
EOSINOPHILS ABSOLUTE: 1.7 K/UL (ref 0–0.6)
EOSINOPHILS RELATIVE PERCENT: 1 %
EOSINOPHILS RELATIVE PERCENT: 4 %
HCT VFR BLD CALC: 16.1 % (ref 40.5–52.5)
HCT VFR BLD CALC: 16.3 % (ref 40.5–52.5)
HCT VFR BLD CALC: 20.1 % (ref 40.5–52.5)
HEMATOLOGY PATH CONSULT: NO
HEMOGLOBIN: 5.2 G/DL (ref 13.5–17.5)
HEMOGLOBIN: 5.7 G/DL (ref 13.5–17.5)
HEMOGLOBIN: 6.9 G/DL (ref 13.5–17.5)
LYMPHOCYTES ABSOLUTE: 3.4 K/UL (ref 1–5.1)
LYMPHOCYTES ABSOLUTE: 7.5 K/UL (ref 1–5.1)
LYMPHOCYTES RELATIVE PERCENT: 7 %
LYMPHOCYTES RELATIVE PERCENT: 8 %
MCH RBC QN AUTO: 31.3 PG (ref 26–34)
MCH RBC QN AUTO: 31.6 PG (ref 26–34)
MCH RBC QN AUTO: 32 PG (ref 26–34)
MCHC RBC AUTO-ENTMCNC: 32.5 G/DL (ref 31–36)
MCHC RBC AUTO-ENTMCNC: 34.1 G/DL (ref 31–36)
MCHC RBC AUTO-ENTMCNC: 34.9 G/DL (ref 31–36)
MCV RBC AUTO: 91.6 FL (ref 80–100)
MCV RBC AUTO: 91.7 FL (ref 80–100)
MCV RBC AUTO: 97 FL (ref 80–100)
METAMYELOCYTES RELATIVE PERCENT: 11 %
MONOCYTES ABSOLUTE: 0.4 K/UL (ref 0–1.3)
MONOCYTES ABSOLUTE: 4.2 K/UL (ref 0–1.3)
MONOCYTES RELATIVE PERCENT: 1 %
MONOCYTES RELATIVE PERCENT: 5 %
MYELOCYTE PERCENT: 9 %
NEUTROPHILS ABSOLUTE: 37.2 K/UL (ref 1.7–7.7)
NEUTROPHILS ABSOLUTE: 71.2 K/UL (ref 1.7–7.7)
NEUTROPHILS RELATIVE PERCENT: 51 %
NEUTROPHILS RELATIVE PERCENT: 78 %
NUCLEATED RED BLOOD CELLS: 34 /100 WBC
NUCLEATED RED BLOOD CELLS: 34 /100 WBC
NUCLEATED RED BLOOD CELLS: 8 /100 WBC
NUCLEATED RED BLOOD CELLS: 8 /100 WBC
PDW BLD-RTO: 15.6 % (ref 12.4–15.4)
PDW BLD-RTO: 16.3 % (ref 12.4–15.4)
PDW BLD-RTO: 17.2 % (ref 12.4–15.4)
PLATELET # BLD: 140 K/UL (ref 135–450)
PLATELET # BLD: 249 K/UL (ref 135–450)
PLATELET # BLD: 66 K/UL (ref 135–450)
PLATELET SLIDE REVIEW: ABNORMAL
PLATELET SLIDE REVIEW: ADEQUATE
PLATELET SLIDE REVIEW: ADEQUATE
PMV BLD AUTO: 8.2 FL (ref 5–10.5)
PMV BLD AUTO: 8.9 FL (ref 5–10.5)
PMV BLD AUTO: 9.9 FL (ref 5–10.5)
POLYCHROMASIA: ABNORMAL
RBC # BLD: 1.66 M/UL (ref 4.2–5.9)
RBC # BLD: 1.77 M/UL (ref 4.2–5.9)
RBC # BLD: 2.2 M/UL (ref 4.2–5.9)
SLIDE REVIEW: ABNORMAL
SMUDGE CELLS: PRESENT
SMUDGE CELLS: PRESENT
VACUOLATED NEUTROPHILS: PRESENT
WBC # BLD: 42.8 K/UL (ref 4–11)
WBC # BLD: 63.8 K/UL (ref 4–11)
WBC # BLD: 83.8 K/UL (ref 4–11)

## 2020-03-17 NOTE — CODE DOCUMENTATION
Patient went into [facility]. Code started at [facility].

## 2020-03-17 NOTE — OP NOTE
renal arteries were supplied off  the false lumen. He dramatically worsened on Friday, and a decision was  made to take him to surgery on Saturday; however, his hyperkalemia was  significant and uncontrollable. He stabilized from a metabolic  standpoint over the weekend with control of his electrolyte  derangements, but did require a pressor support. He also demonstrated  signs of liver malperfusion with elevated transaminases. His renal  function rapidly deteriorated requiring a combination of hemodialysis  and CRRT. Discussions with the family were undertaken regarding  management steps. It was made clear to the family that he was not in  any condition to undergo general anesthetic on Saturday or Sunday and  the family wished to continue all measures for management. The outlook  was predicted to be poor, however, again the family requested full  measures. On Monday, his hyperkalemia was controlled and he remained  stable over the week on the current doses of pressor agents. Technically, it was felt repair of the dissection using endograft  techniques was possible. This was explained to the patient's family and  they wished to proceed understanding the high risk of perioperative  complications and death. They agreed to proceed understanding all these  issues. OPERATIVE PROCEDURE:  The patient was brought to the hybrid operating  room and placed on the table in supine position. After adequate  induction of an anesthetic, the abdomen and groins were prepped and  draped in a sterile fashion. A right groin hemodialysis catheter was  intended to be removed, however, it was out of the field of exposure and  it was prepped and draped into the field in case it was required to be  removed. An oblique incision was made in the right groin crease over the femoral  pulse, and subcutaneous tissue was divided using electrocautery. Crossing veins were divided between clips.   Common femoral artery throughout the infrarenal aortic covering the visceral vessels. A  36 x 180 cm long stent was chosen. It was advanced over the Lunderquist  wire and deployed with overlap proximally down to just above the aortic  bifurcation. A completion angiogram with pigtail catheter positioned  over the celiac artery demonstrated patency of the celiac, SMA, and  bilateral renal arteries with excellent flow. There appeared to be a  slight dissection tear extending into the proximal left common iliac  artery without significant issues. At this point, it was felt treatment  was adequate. The wires and sheath were removed. Prior to performing the procedure, the patient had been given 5000 units  of heparin. When removing the sheath out of the right groin, the vessel  was clamped proximally and distally and examination of the lumen through  the transverse arteriotomy demonstrated complex dissection. It was  decided at this point that the artery will require reconstruction. Further proximal control was gained and dissection was continued down  onto the femoral bifurcation where the artery appeared normal.  The  profundus and the superficial femoral arteries were controlled with  clamps. A longitudinal arteriotomy was made in the common femoral artery and the  dissection plane was mobilized. The thrombosed dissection false lumen  was excised producing an excellent endpoint distally, which was  controlled with two separate 7-0 Prolenes in the distal common femoral  artery. A Bovine pericardial patch was brought on the field and cut to  appropriate length and shape. It was anchored proximally and distally  with 6-0 Prolenes and these were run towards each other to repair the  artery. Prior to completion of repair, these arteries were forward and backbled  and flushed with heparinized saline. There was noted to be good flow. The repair was completed and flow was restored progressively to the  right leg.   The patient tolerated this well. The right groin was then  closed using four layers of 3-0 Vicryl followed by staples. A NATAN  dressing was applied, and the patient was transferred back to the ICU in  serious, but stable condition.     Vijay Amor MD    D: 03/17/2020 7:28:14       T: 03/17/2020 11:24:48     [id number]  Job#: [id number]     Doc#: [id number]    CC:

## 2020-03-17 NOTE — PROGRESS NOTES
*Late entry*    After scanning in first unit of blood, 3 more units were given by rapid succession. Armband and blood were double check by this RN and another RN. Patient was coding and urgently needed fluid resuscitation in form of blood. Dr. Nickie Barclay at bedside and gave verbal order to rapidly transfuse.     Electronically signed by Mustapha Brenner RN on 3/17/2020 at 1:56 AM

## 2020-03-17 NOTE — SIGNIFICANT EVENT
Patient become hypotensive tonight - he coded at least 3 times tonight (each estimated > 25 minutes) - on the third we were unable to get ROSC and so efforts were stopped when they were no longer deemed to be beneficial.    He suffered multiple medical problems  -   He had worsening evidence of respiratory failure (PCO2>130) despite aggressive efforts with ventilator and RT bagging patient during codes. He suffered profound anemia - Hgb of 3 reported by machine during code - despite efforts to give 3-4 units of blood and volume resuscitation with NS. He had ongoing acidosis despite > 6 bicarb boluses     A left femoral vascular catheter was emergently placed after the 2nd code with the goal of resuming CRRT as a solution to the ongoing acidosis (vasc cath in his R femoral had ceased to function)   Patient supported with epi drip, additionally repeated epi boluses as indicated to try to attain / maintain ROSC - ultimately without success. See associated notes for additional details. Time of death 5 PM.   Patient's Mom and fiance notified.  called for support.      Andrea 235

## 2020-03-18 LAB
CULTURE, RESPIRATORY: NORMAL
GRAM STAIN RESULT: NORMAL

## 2020-03-18 NOTE — DISCHARGE SUMMARY
aortic dissection, found to be type B 3/5/20. Concurrent L lateral canthotomy was performed. CV       - SR.               - antihypertensives. po meds per tube - BB  vasc     - RLE maintained pulses/Dopp signals   - thoracic endovascular stenting 3/16  pulm    - remained intubated. LLL consolidation resolved after bronchoscopy x 2 with improvement in oxygenation  ID         - bronch cx 3/11 neg so far. Empiric abx              - wbc elevation. Likely secondary to liver changes. Renal   - JEYSON. CRRT, resumed 3/13.              - retain Dillon for accurate I+O  GI        - TF. kaofeed - tip postpyloric              - elevated LFTs 3/13 compared to normal 3/12. GI consulted, no trigger identified. Heme   - acute blood loss anemia from surgery 3/5, then stable. Hemolyzing 3/13 onwards. Transfused. - scds, sc hep  ophtho - lat canthotomy. Per ophtho recs 3/9. CRRT stopped for endovascular surgery 3/16. Resumed upon return to CVU. Labs 19:20 K 5.8, lactate 11.69, gluc 63, hgb 5.4. abg 7./45/-5  PEA 19:50   22:32    Weight:   Admission Weight: 279 lb 15.8 oz (127 kg)  Day of discharge Weight: 286 lb 13.1 oz (130.1 kg)    Recent Labs     20   WBC 63.8*  --  59.6*  --  42.8*   HGB 6.9*   < > 5.8* 3.7* 5.2*   HCT 20.1*  --  17.0*  --  16.1*   MCV 91.6  --  92.2  --  97.0     --  127*  --  66*    < > = values in this interval not displayed.      Recent Labs     20  0855 20  1205 200   * 130* 128* 143   K 3.5 3.5 5.8* 7.6*   CL 85* 86* 88* 93*   CO2 25 26 21 15*   PHOS 4.8 3.8 7.9* 16.8*   BUN 36* 32* 39* 34*   CREATININE 1.2 0.9 1.4* 1.9*   CALCIUM 7.5* 7.6* 7.6* 9.1   MG 2.30  --  2.80* 2.90*     Recent Labs     20  1325 20  1920 20  2130   PROTIME 21.1* 23.7* 37.2*   INR 1.81* 2.03* 3.17*       Azeem Peña MD  3/18/2020  8:43 AM

## 2020-03-20 LAB
BLOOD CULTURE, ROUTINE: NORMAL
FINAL REPORT: NORMAL
PRELIMINARY: NORMAL

## 2020-03-23 LAB
FINAL REPORT: NORMAL
PRELIMINARY: NORMAL

## 2020-04-13 LAB
FUNGUS (MYCOLOGY) CULTURE: ABNORMAL
FUNGUS STAIN: ABNORMAL
ORGANISM: ABNORMAL

## 2020-04-28 LAB
AFB CULTURE (MYCOBACTERIA): NORMAL
AFB SMEAR: NORMAL

## 2022-08-15 NOTE — PLAN OF CARE
Problem: Restraint Use - Nonviolent/Non-Self-Destructive Behavior:  Goal: Absence of restraint indications  Description: Absence of restraint indications  Outcome: Ongoing     Problem: Restraint Use - Nonviolent/Non-Self-Destructive Behavior:  Goal: Absence of restraint-related injury  Description: Absence of restraint-related injury  Outcome: Ongoing  Note: Q2h assessment and documentation of restraints. Update restraint order Q24h. Continuous reassessment of need for restraints. Monitor neurovascular status while in bilateral wrist restraints. PROM. Problem: Fluid Volume - Imbalance:  Goal: Ability to achieve a balanced intake and output will improve  Description: Ability to achieve a balanced intake and output will improve  Outcome: Ongoing  Note: Goal SBP . Continuous hemodynamic monitoring via SWAN and arterial lines. Continue CRRT; monitor ourly I&Os. Serial labs for fluid and electrolyte monitoring. Problem: Gas Exchange - Impaired:  Goal: Levels of oxygenation will improve  Description: Levels of oxygenation will improve  Outcome: Ongoing  Note: Maintain patent airway during shift; ensure patent ETT. Continuous pulse oximetry. Ventilator support; wean as appropriate for pt. Serial ABGs Q3h. Problem: Pain:  Goal: Pain level will decrease  Description: Pain level will decrease  Outcome: Ongoing  Note: CPOT pain scoring while intubated and sedated.  Non-pharmacological treatments/therapies; continuous sedation with propofol and fentanyl to allow for rest and optimal oxygenation No

## (undated) DEVICE — NEEDLE HYPO 18GA L1.5IN THN WALL PIVOTING SHLD BVL ORIENTED

## (undated) DEVICE — SUTURE VCRL SZ 2-0 L36IN ABSRB UD L36MM CT-1 1/2 CIR J945H

## (undated) DEVICE — COVER LT HNDL CAM BLU DISP W/ SURG CTRL

## (undated) DEVICE — SUTURE GOR TX SZ 2-0 L36IN NONABSORBABLE L18MM TH-18 1/2 3N04B

## (undated) DEVICE — SUTURE NONABSORBABLE MONOFILAMENT 6-0 C-1 1X30 IN PROLENE 8706H

## (undated) DEVICE — SET ADMIN PRIMING 12ML L36IN 2ND INCL RLER CLMP SPIN M

## (undated) DEVICE — SUTURE PERMAHAND SZ 2-0 L30IN NONABSORBABLE BLK SILK W/O A305H

## (undated) DEVICE — PROCEDURE KIT COMP 5Y10R8

## (undated) DEVICE — CAUTERY ES FN TIP 2IN FT2 HI TEMP FNGR TIP CTRL SFTY CAP AA

## (undated) DEVICE — SUTURE NONABSORBABLE MONOFILAMENT 5-0 C-1 1X24 IN PROLENE 8725H

## (undated) DEVICE — DECANTER: Brand: UNBRANDED

## (undated) DEVICE — SUTURE VCRL SZ 3-0 L27IN ABSRB UD L36MM CT-1 1/2 CIR J258H

## (undated) DEVICE — VESSEL LOOPS,MAXI, RED: Brand: DEVON

## (undated) DEVICE — GAUZE,SPONGE,4"X4",16PLY,XRAY,STRL,LF: Brand: MEDLINE

## (undated) DEVICE — AEGIS 1" DISK 4MM HOLE, PEEL OPEN: Brand: MEDLINE

## (undated) DEVICE — DRAPE SLUSH DISC W44XL66IN ST FOR RND BSIN HUSH SLUSH SYS

## (undated) DEVICE — TUBING PERF PMP L4FT ID3 8IN WALL 3 32IN PRECUT CAPPED PEEL

## (undated) DEVICE — AVA HIGH FLOW BUNDLE: Brand: MEDLINE

## (undated) DEVICE — SUTURE PROL SZ 3-0 L36IN NONABSORBABLE BLU L17MM RB-1 1/2 8558H

## (undated) DEVICE — GAUZE SPONGES,12 PLY: Brand: CURITY

## (undated) DEVICE — DECANTER BAG 9": Brand: MEDLINE INDUSTRIES, INC.

## (undated) DEVICE — MATERIAL IMPL BNE HEMSTAS 3.5 GM ALKYLENE STRL OSTENE

## (undated) DEVICE — VESSEL LOOPS,MAXI, BLUE: Brand: DEVON

## (undated) DEVICE — SUTURE VCRL SZ 0 L27IN ABSRB UD L36MM CT-1 1/2 CIR J260H

## (undated) DEVICE — EVERGRIP INSERT SET 86MM: Brand: FOGARTY EVERGRIP

## (undated) DEVICE — TOWEL,OR,DSP,ST,WHITE,DLX,XR,4/PK,20PK/C: Brand: MEDLINE

## (undated) DEVICE — Device: Brand: MEDEX

## (undated) DEVICE — APPLICATOR PREP 26ML 0.7% IOD POVACRYLEX 74% ISO ALC ST

## (undated) DEVICE — SUTURE PROL SZ 5-0 L36IN NONABSORBABLE BLU L13MM C-1 3/8 8720H

## (undated) DEVICE — SYRINGE MED 3ML TRNSLUC BRL POLYPR GEN PURP W/ LUERLOCK TIP

## (undated) DEVICE — COVER,MAYO STAND,STERILE: Brand: MEDLINE

## (undated) DEVICE — DRESSING GRMCDL 6 12FR D1N CNTR HOLE 4MM ANTMCRBL PRTCTVE DI

## (undated) DEVICE — GLOVE,SURG,SENSICARE SLT,LF,PF,7: Brand: MEDLINE

## (undated) DEVICE — PACK PROCEDURE SURG EXTREMITY MFFOP CUST

## (undated) DEVICE — SOLUTION IV 1000ML 0.9% SOD CHL PH 5 INJ USP VIAFLX PLAS

## (undated) DEVICE — HYPODERMIC SAFETY NEEDLE: Brand: MAGELLAN

## (undated) DEVICE — DRAIN SURG SGL COLL PT TB FOR ATS BG OASIS

## (undated) DEVICE — TOTAL TRAY, 16FR 10ML SIL FOLEY, URN: Brand: MEDLINE

## (undated) DEVICE — TUBING, SUCTION, 1/4" X 12', STRAIGHT: Brand: MEDLINE

## (undated) DEVICE — MERCY HEALTH WEST TURNOVER: Brand: MEDLINE INDUSTRIES, INC.

## (undated) DEVICE — PICO 7 10CM X 20CM: Brand: PICO™ 7

## (undated) DEVICE — SOLUTION IV IRRIG POUR BRL 0.9% SODIUM CHL 2F7124

## (undated) DEVICE — BASIC SINGLE BASIN 1-LF: Brand: MEDLINE INDUSTRIES, INC.

## (undated) DEVICE — 12 FOOT DISPOSABLE EXTENSION CABLE WITH SAFE CONNECT / SCREW-DOWN

## (undated) DEVICE — TOWEL,OR,DSP,ST,BLUE,STD,4/PK,20PK/CS: Brand: MEDLINE

## (undated) DEVICE — FILTER OXGNTR GAS BACT VIR REMOVAL W/ 1/4 INLET

## (undated) DEVICE — SUTURE PROL SZ 3-0 L36IN NONABSORBABLE BLU L26MM SH 1/2 CIR 8522H

## (undated) DEVICE — Z INACTIVE USE 2540311 LEAD PACE L475MM CHN A OR V MYOCARDIAL STEROID ELUT SIL

## (undated) DEVICE — TOWEL,OR,DSP,ST,GREEN,DLX,4/PK,20PK/CS: Brand: MEDLINE

## (undated) DEVICE — CONNECTOR IV TB L28MM CLR VLV ACCS NDLLSS DISP MAXPLUS

## (undated) DEVICE — SUTURE PERMAHAND SZ 4-0 L18IN NONABSORBABLE BLK SILK BRAID A183H

## (undated) DEVICE — LOOP VES W25MM THK1MM MAXI RED SIL FLD REPELLENT 100 PER

## (undated) DEVICE — CONNECTOR PERF L5 IN OD1 2 IN SAT HCT ST FEATURING B CARE 5

## (undated) DEVICE — SET ADMIN PRIMING 67ML L105IN NVENT 180UM FLTR 3 RLER CLMP

## (undated) DEVICE — INTENDED FOR TISSUE SEPARATION, AND OTHER PROCEDURES THAT REQUIRE A SHARP SURGICAL BLADE TO PUNCTURE OR CUT.: Brand: BARD-PARKER ® STAINLESS STEEL BLADES

## (undated) DEVICE — TAPE MED W1/8XL30IN WHT POLY

## (undated) DEVICE — 3M™ TEGADERM™ TRANSPARENT FILM DRESSING FRAME STYLE, 1624W, 2-3/8 IN X 2-3/4 IN (6 CM X 7 CM), 100/CT 4CT/CASE: Brand: 3M™ TEGADERM™

## (undated) DEVICE — X-RAY CASSETTE COVER: Brand: CONVERTORS

## (undated) DEVICE — SYRINGE MED 10ML LUERLOCK TIP W/O SFTY DISP

## (undated) DEVICE — SUTURE PERMA-HAND SZ 2 L60IN NONABSORBABLE BLK SILK BRAID SA8H

## (undated) DEVICE — SUTURE PERMAHAND SZ 2-0 L12X18IN NONABSORBABLE BLK SILK A185H

## (undated) DEVICE — CATHETER THOR 36FR L23CM DIA12MM POLYVI CHL TAPR CONN TIP

## (undated) DEVICE — LABEL MED CUST CVR

## (undated) DEVICE — PRESSURE MONITORING SET: Brand: TRUWAVE, VAMP PLUS

## (undated) DEVICE — SUTURE ETHBND EXCEL SZ 2-0 L36IN NONABSORBABLE GRN L26MM SH X523H

## (undated) DEVICE — SPONGE LAP W18XL18IN WHT COT 4 PLY FLD STRUNG RADPQ DISP ST

## (undated) DEVICE — SUMP INTCARD SUCT AD 20FR PERICARD MAYO STYL FLX VERSATILE

## (undated) DEVICE — APPLIER LIG CLP M L11IN TI STR RNG HNDL FOR 20 CLP DISP

## (undated) DEVICE — APPLIER CLP L L13IN TI MULT RNG HNDL 20 CLP STR LIGACLP

## (undated) DEVICE — Z DISCONTINUED USE 2516375 APPLICATOR MEDICATED 3 CC CLR STRL CHLORAPREP

## (undated) DEVICE — SUTURE PROL SZ 3-0 L48IN NONABSORBABLE BLU SH L26MM 1/2 CIR 8534H

## (undated) DEVICE — SET PERF L15IN BLU CLMP MULT FEM LUER ON SGL INLET LEG DLP

## (undated) DEVICE — 3M™ TEGADERM™ TRANSPARENT FILM DRESSING FRAME STYLE, 1626W, 4 IN X 4-3/4 IN (10 CM X 12 CM), 50/CT 4CT/CASE: Brand: 3M™ TEGADERM™

## (undated) DEVICE — BLANKET WRM CARD FOR MISTRAL AIR WRM SYS

## (undated) DEVICE — SUTURE PROL SZ 5-0 L18IN NONABSORBABLE BLU C-1 L13MM 3/8 8717H

## (undated) DEVICE — MONITOR LN W LUER LOK 72

## (undated) DEVICE — SUTURE MCRYL SZ 4-0 L27IN ABSRB UD L19MM PS-2 1/2 CIR PRIM Y426H

## (undated) DEVICE — MAJOR SET UP PK

## (undated) DEVICE — 3M™ IOBAN™ 2 ANTIMICROBIAL INCISE DRAPE 6650EZ: Brand: IOBAN™ 2

## (undated) DEVICE — TRAY KIT 2 APPL 2 FLAT TIP

## (undated) DEVICE — KIT ART LN 20GA L12CM FEP RADPQ 0.025X13.75IN SPR GWIRE

## (undated) DEVICE — SUTURE ETHBND EXCEL SZ 0 L18IN NONABSORBABLE GRN L36MM CT-1 CX21D

## (undated) DEVICE — MEDI-VAC NON-CONDUCTIVE SUCTION TUBING: Brand: CARDINAL HEALTH

## (undated) DEVICE — DISCONTINUED USE 394516 DRESSING MEPILEX SACRUM 7.2X7.2

## (undated) DEVICE — FOGARTY - HYDRAGRIP SURGICAL - CLAMP INSERTS: Brand: FOGARTY SOFTJAW

## (undated) DEVICE — TRAY URIN CATH PED 16FR BLLN 5CC 2 CONN SIL STR TIP W/ URIN

## (undated) DEVICE — SUTURE PERMAHAND SZ 0 L30IN NONABSORBABLE BLK SILK BRAID A306H

## (undated) DEVICE — SUTURE NONABSORBABLE MONOFILAMENT 4-0 RB-1 36 IN BLU PROLENE 8557H

## (undated) DEVICE — DRESSING TRNSPAR W FAB BORD SORBAVIEW ULT 475X425IN

## (undated) DEVICE — SUTURE BOOT: Brand: DEROYAL

## (undated) DEVICE — DRAPE C ARM UNIV W41XL74IN CLR PLAS XR VELC CLSR POLY STRP

## (undated) DEVICE — LOOP,VESSEL,MAXI,BLUE,2/PK,STERILE: Brand: MEDLINE

## (undated) DEVICE — Z INACTIVE USE 2582933 BAG BLD TRNSF 1 CPLR IV ST 600ML TERUFLEX

## (undated) DEVICE — STERILE LATEX POWDER-FREE SURGICAL GLOVESWITH NITRILE COATING: Brand: PROTEXIS

## (undated) DEVICE — STAPLER SKIN H3.9MM WIRE DIA0.58MM CRWN 6.9MM 35 STPL ROT

## (undated) DEVICE — SYRINGE MED 30ML STD CLR PLAS LUERLOCK TIP N CTRL DISP

## (undated) DEVICE — SCANLAN® VASCU-STATT® SINGLE-USE BULLDOG CLAMP - MIDI ANGLED 45° (WHITE), CLAMPING PRESSURE 25-30 G (2/STERILE PKG): Brand: SCANLAN® VASCU-STATT® SINGLE-USE BULLDOG CLAMP

## (undated) DEVICE — SYRINGE MED 50ML LUERLOCK TIP

## (undated) DEVICE — SUTURE PROL SZ 6-0 L24IN NONABSORBABLE BLU L13MM C-1 3/8 8726H

## (undated) DEVICE — PRESSURE MONITORING SET: Brand: TRUWAVE

## (undated) DEVICE — PAD THRM M SPL TORSO

## (undated) DEVICE — ELECTRODE PT RET AD L9FT HI MOIST COND ADH HYDRGEL CORDED

## (undated) DEVICE — SUTURE S STL SZ 6 L18IN NONABSORBABLE SIL L48MM CCS 1/2 CIR M654G

## (undated) DEVICE — SET EXTN L41IN 2 NDL FREE VALVES FREE INJ PRT

## (undated) DEVICE — AGENT HEMSTAT W4XL8IN OXIDIZED REGENERATED CELOS ABSRB

## (undated) DEVICE — 60 ML SYRINGE,LUER-LOCK TIP: Brand: MONOJECT

## (undated) DEVICE — COVER LT HNDL BLU PLAS

## (undated) DEVICE — KIT OR ROOM TURNOVER W/STRAP

## (undated) DEVICE — 48" PROBE COVER W/GEL, ULTRASOUND, STERILE: Brand: SITE-RITE

## (undated) DEVICE — SUTURE CHROMIC GUT SZ 2-0 L27IN ABSRB BRN L36MM CT-1 1/2 811H

## (undated) DEVICE — DEVICE SECUREMENT AD W/ TRICOT ANCHR PD FOR F LTX SIL CATH

## (undated) DEVICE — CANNULA PERF L125IN OD15FR POLYVI CHL VEN RG AUTO INFL SMOOT

## (undated) DEVICE — BLOOD TRANSFUSION FILTER: Brand: HAEMONETICS

## (undated) DEVICE — SUTURE PERMAHAND SZ 3-0 L18IN NONABSORBABLE BLK SILK BRAID A184H

## (undated) DEVICE — AGENT HEMSTAT W2XL4IN OXIDIZED REGENERATED CELOS ABSRB

## (undated) DEVICE — CLOTH SURG PREP PREOPERATIVE CHLORHEXIDINE GLUC 2% READYPREP

## (undated) DEVICE — SUTURE VCRL SZ 0 L18IN ABSRB UD L36MM CT-1 1/2 CIR J840D

## (undated) DEVICE — CAP TBNG ACC CHEMO SFTY LUER FEM OR M TEXIUM

## (undated) DEVICE — SYRINGE 20ML LL S/C 50

## (undated) DEVICE — PLEDGET SURG W0.375XL0.062IN THK1.5MM WHT SFT PTFE RECT

## (undated) DEVICE — CANISTER, RIGID, 1200CC: Brand: MEDLINE INDUSTRIES, INC.

## (undated) DEVICE — STERILE LATEX POWDER-FREE SURGICAL GLOVESWITH NITRILE AND EMOLLIENT COATINGS: Brand: PROTEXIS

## (undated) DEVICE — SURGICAL PROCEDURE PACK PROC SMARTPREP 2

## (undated) DEVICE — CHLORAPREP 26ML ORANGE

## (undated) DEVICE — X-RAY DETECTABLE SPONGES,12 PLY: Brand: VISTEC

## (undated) DEVICE — PACK DRP UNIV W BK TBL MAYO STD BTM TOP SIDE UTIL CVR ZONE

## (undated) DEVICE — RESERVOIR AUTOTRANSFUSION 225/120 CC GS FILTERED XTRA

## (undated) DEVICE — SUTURE VCRL SZ 3-0 L27IN ABSRB UD L26MM SH 1/2 CIR J416H

## (undated) DEVICE — STERILE POLYISOPRENE POWDER-FREE SURGICAL GLOVES: Brand: PROTEXIS

## (undated) DEVICE — CANNULA PERF 7FR L5.5IN AORT ROOT RADPQ STD TIP W/ VENT LN

## (undated) DEVICE — SWAN-GANZ CCOMBO THERMODILUTION CATHETER: Brand: SWAN-GANZ CCOMBO

## (undated) DEVICE — CUSTOM PK 10A99R2 RED VENT

## (undated) DEVICE — CATHETER THOR 36FR L23IN PVC R ANG 5 EYE TAPR CONN TIP SFT

## (undated) DEVICE — BLADE CLIPPER GEN PURP NS

## (undated) DEVICE — VENT CANN 10FR CONN DIA025IN L VENT PVC VENT CONN R ANG

## (undated) DEVICE — AGENT HEMSTAT W6XL9IN OXIDIZED REGENERATED CELOS ABSRB FOR

## (undated) DEVICE — STERNUM BLADE, OFFSET (31.7 X 0.64 X 6.3MM)

## (undated) DEVICE — SUTURE VCRL SZ 2-0 L27IN ABSRB UD L26MM SH 1/2 CIR J417H

## (undated) DEVICE — SHEET,DRAPE,53X77,STERILE: Brand: MEDLINE

## (undated) DEVICE — STRIP,CLOSURE,WOUND,MEDI-STRIP,1/2X4: Brand: MEDLINE

## (undated) DEVICE — DUAL STAGE VENOUS RETURN CANNULA: Brand: THIN-FLEX DUAL STAGE VENOUS DRAINAGE CANNULA